# Patient Record
Sex: MALE | Race: WHITE | NOT HISPANIC OR LATINO | URBAN - METROPOLITAN AREA
[De-identification: names, ages, dates, MRNs, and addresses within clinical notes are randomized per-mention and may not be internally consistent; named-entity substitution may affect disease eponyms.]

---

## 2017-02-07 ENCOUNTER — OUTPATIENT (OUTPATIENT)
Dept: OUTPATIENT SERVICES | Facility: HOSPITAL | Age: 67
LOS: 1 days | Discharge: HOME | End: 2017-02-07

## 2017-02-17 ENCOUNTER — APPOINTMENT (OUTPATIENT)
Dept: CARDIOLOGY | Facility: CLINIC | Age: 67
End: 2017-02-17

## 2017-02-17 VITALS
BODY MASS INDEX: 29.16 KG/M2 | OXYGEN SATURATION: 95 % | SYSTOLIC BLOOD PRESSURE: 117 MMHG | DIASTOLIC BLOOD PRESSURE: 73 MMHG | WEIGHT: 220 LBS | HEART RATE: 82 BPM | HEIGHT: 73 IN

## 2017-06-27 DIAGNOSIS — I71.2 THORACIC AORTIC ANEURYSM, WITHOUT RUPTURE: ICD-10-CM

## 2017-08-25 ENCOUNTER — APPOINTMENT (OUTPATIENT)
Dept: CARDIOLOGY | Facility: CLINIC | Age: 67
End: 2017-08-25

## 2017-10-31 ENCOUNTER — MOBILE ON CALL (OUTPATIENT)
Age: 67
End: 2017-10-31

## 2017-12-08 ENCOUNTER — APPOINTMENT (OUTPATIENT)
Dept: CARDIOLOGY | Facility: CLINIC | Age: 67
End: 2017-12-08

## 2017-12-08 VITALS
BODY MASS INDEX: 29.95 KG/M2 | HEART RATE: 75 BPM | DIASTOLIC BLOOD PRESSURE: 79 MMHG | SYSTOLIC BLOOD PRESSURE: 126 MMHG | WEIGHT: 227 LBS | OXYGEN SATURATION: 97 %

## 2017-12-08 DIAGNOSIS — Z80.9 FAMILY HISTORY OF MALIGNANT NEOPLASM, UNSPECIFIED: ICD-10-CM

## 2017-12-08 DIAGNOSIS — Z95.2 PRESENCE OF PROSTHETIC HEART VALVE: ICD-10-CM

## 2017-12-08 DIAGNOSIS — F15.90 OTHER STIMULANT USE, UNSPECIFIED, UNCOMPLICATED: ICD-10-CM

## 2017-12-08 DIAGNOSIS — E78.00 PURE HYPERCHOLESTEROLEMIA, UNSPECIFIED: ICD-10-CM

## 2017-12-18 ENCOUNTER — OUTPATIENT (OUTPATIENT)
Dept: OUTPATIENT SERVICES | Facility: HOSPITAL | Age: 67
LOS: 1 days | Discharge: HOME | End: 2017-12-18

## 2017-12-18 DIAGNOSIS — Z01.818 ENCOUNTER FOR OTHER PREPROCEDURAL EXAMINATION: ICD-10-CM

## 2017-12-18 DIAGNOSIS — I48.92 UNSPECIFIED ATRIAL FLUTTER: ICD-10-CM

## 2018-01-30 ENCOUNTER — OUTPATIENT (OUTPATIENT)
Dept: OUTPATIENT SERVICES | Facility: HOSPITAL | Age: 68
LOS: 1 days | Discharge: HOME | End: 2018-01-30

## 2018-01-30 ENCOUNTER — INPATIENT (INPATIENT)
Facility: HOSPITAL | Age: 68
LOS: 0 days | Discharge: HOME | End: 2018-01-31
Attending: INTERNAL MEDICINE

## 2018-01-30 DIAGNOSIS — Z95.2 PRESENCE OF PROSTHETIC HEART VALVE: ICD-10-CM

## 2018-01-30 DIAGNOSIS — Z45.018 ENCOUNTER FOR ADJUSTMENT AND MANAGEMENT OF OTHER PART OF CARDIAC PACEMAKER: ICD-10-CM

## 2018-01-30 DIAGNOSIS — I45.5 OTHER SPECIFIED HEART BLOCK: ICD-10-CM

## 2018-01-30 DIAGNOSIS — I38 ENDOCARDITIS, VALVE UNSPECIFIED: ICD-10-CM

## 2018-01-30 DIAGNOSIS — I48.92 UNSPECIFIED ATRIAL FLUTTER: ICD-10-CM

## 2018-01-30 DIAGNOSIS — I10 ESSENTIAL (PRIMARY) HYPERTENSION: ICD-10-CM

## 2018-02-04 DIAGNOSIS — Z95.810 PRESENCE OF AUTOMATIC (IMPLANTABLE) CARDIAC DEFIBRILLATOR: ICD-10-CM

## 2018-02-12 DIAGNOSIS — I10 ESSENTIAL (PRIMARY) HYPERTENSION: ICD-10-CM

## 2018-02-12 DIAGNOSIS — Z45.018 ENCOUNTER FOR ADJUSTMENT AND MANAGEMENT OF OTHER PART OF CARDIAC PACEMAKER: ICD-10-CM

## 2018-02-12 DIAGNOSIS — Z95.2 PRESENCE OF PROSTHETIC HEART VALVE: ICD-10-CM

## 2018-02-12 DIAGNOSIS — M17.0 BILATERAL PRIMARY OSTEOARTHRITIS OF KNEE: ICD-10-CM

## 2018-02-23 ENCOUNTER — APPOINTMENT (OUTPATIENT)
Dept: CARDIOLOGY | Facility: CLINIC | Age: 68
End: 2018-02-23

## 2018-02-23 VITALS
DIASTOLIC BLOOD PRESSURE: 83 MMHG | OXYGEN SATURATION: 95 % | HEART RATE: 80 BPM | WEIGHT: 230 LBS | BODY MASS INDEX: 30.48 KG/M2 | SYSTOLIC BLOOD PRESSURE: 139 MMHG | HEIGHT: 73 IN

## 2018-07-06 ENCOUNTER — APPOINTMENT (OUTPATIENT)
Dept: CARDIOLOGY | Facility: CLINIC | Age: 68
End: 2018-07-06

## 2018-07-06 VITALS
BODY MASS INDEX: 30.48 KG/M2 | SYSTOLIC BLOOD PRESSURE: 114 MMHG | HEIGHT: 73 IN | OXYGEN SATURATION: 95 % | DIASTOLIC BLOOD PRESSURE: 74 MMHG | WEIGHT: 230 LBS | HEART RATE: 85 BPM

## 2018-07-06 DIAGNOSIS — I49.8 OTHER SPECIFIED CARDIAC ARRHYTHMIAS: ICD-10-CM

## 2018-11-27 ENCOUNTER — OUTPATIENT (OUTPATIENT)
Dept: OUTPATIENT SERVICES | Facility: HOSPITAL | Age: 68
LOS: 1 days | Discharge: HOME | End: 2018-11-27

## 2018-11-27 DIAGNOSIS — I71.2 THORACIC AORTIC ANEURYSM, WITHOUT RUPTURE: ICD-10-CM

## 2018-11-27 DIAGNOSIS — R06.9 UNSPECIFIED ABNORMALITIES OF BREATHING: ICD-10-CM

## 2019-01-11 ENCOUNTER — APPOINTMENT (OUTPATIENT)
Dept: CARDIOLOGY | Facility: CLINIC | Age: 69
End: 2019-01-11

## 2019-01-11 VITALS
HEIGHT: 73 IN | DIASTOLIC BLOOD PRESSURE: 84 MMHG | BODY MASS INDEX: 29.95 KG/M2 | HEART RATE: 75 BPM | SYSTOLIC BLOOD PRESSURE: 128 MMHG | WEIGHT: 226 LBS

## 2019-01-11 DIAGNOSIS — Z95.0 PRESENCE OF CARDIAC PACEMAKER: ICD-10-CM

## 2019-01-11 NOTE — PROCEDURE
[None] : none [Complete Heart Block] : complete heart block [CRT-P] : Cardiac resynchronization therapy pacemaker [DDDR] : DDDR [Voltage: ___ volts] : Voltage was [unfilled] volts [Longevity: ___ months] : The estimated remaining battery life is [unfilled] months [Threshold Testing Performed] : Threshold testing was performed [Atrial] : Atrial [Ventricular] : Ventricular [Sensing Amplitude ___mv] : sensing amplitude was [unfilled] mv [Lead Imp:  ___ohms] : lead impedance was [unfilled] ohms [___V @] : [unfilled] V [___ ms] : [unfilled] ms [Programmed for Longevity] : output reprogrammed for improved battery longevity [Counters Reset] : the counters were reset [Asense-Vsense ___ %] : Asense-Vsense [unfilled]% [Asense-Vpace ___ %] : Asense-Vpace [unfilled]% [Apace-Vsense ___ %] : Apace-Vsense [unfilled]% [Apace-Vpace ___ %] : Apace-Vpace [unfilled]% [de-identified] : FATOUMATA [de-identified] : Percepta [de-identified] : [id number] [de-identified] : 70 [de-identified] : Partial + turned to on due to presence of farfield oversensing at times.  [de-identified] : Normal device function. No events

## 2019-01-11 NOTE — PROCEDURE
[None] : none [Complete Heart Block] : complete heart block [CRT-P] : Cardiac resynchronization therapy pacemaker [DDDR] : DDDR [Voltage: ___ volts] : Voltage was [unfilled] volts [Longevity: ___ months] : The estimated remaining battery life is [unfilled] months [Threshold Testing Performed] : Threshold testing was performed [Atrial] : Atrial [Ventricular] : Ventricular [Sensing Amplitude ___mv] : sensing amplitude was [unfilled] mv [Lead Imp:  ___ohms] : lead impedance was [unfilled] ohms [___V @] : [unfilled] V [___ ms] : [unfilled] ms [Programmed for Longevity] : output reprogrammed for improved battery longevity [Counters Reset] : the counters were reset [Asense-Vsense ___ %] : Asense-Vsense [unfilled]% [Asense-Vpace ___ %] : Asense-Vpace [unfilled]% [Apace-Vsense ___ %] : Apace-Vsense [unfilled]% [Apace-Vpace ___ %] : Apace-Vpace [unfilled]% [de-identified] : FATOUMATA [de-identified] : Percepta [de-identified] : [id number] [de-identified] : 70 [de-identified] : Partial + turned to on due to presence of farfield oversensing at times.  [de-identified] : Normal device function. No events

## 2019-01-11 NOTE — HISTORY OF PRESENT ILLNESS
[None] : The patient complains of no symptoms [Palpitations] : no palpitations [SOB] : no dyspnea [Syncope] : no syncope [ICD Shocks] : no recent ICD shocks [Erythema at Site] : no erythema at device site [Swelling at Site] : no swelling at device site

## 2019-01-11 NOTE — ASSESSMENT
[FreeTextEntry1] : AF - pt decided to stopped AC on his own; I recommended to continue risk and befits explained\par \par \par NSVT asymptomatic\par - echo\par - stress test

## 2019-06-07 ENCOUNTER — INPATIENT (INPATIENT)
Facility: HOSPITAL | Age: 69
LOS: 6 days | Discharge: HOME HEALTH | End: 2019-06-14
Attending: INTERNAL MEDICINE | Admitting: INTERNAL MEDICINE
Payer: MEDICARE

## 2019-06-07 VITALS
DIASTOLIC BLOOD PRESSURE: 65 MMHG | HEART RATE: 90 BPM | RESPIRATION RATE: 18 BRPM | WEIGHT: 225.09 LBS | SYSTOLIC BLOOD PRESSURE: 125 MMHG | TEMPERATURE: 102 F | OXYGEN SATURATION: 97 %

## 2019-06-07 LAB
ALBUMIN SERPL ELPH-MCNC: 4 G/DL — SIGNIFICANT CHANGE UP (ref 3.5–5.2)
ALP SERPL-CCNC: 67 U/L — SIGNIFICANT CHANGE UP (ref 30–115)
ALT FLD-CCNC: 43 U/L — HIGH (ref 0–41)
ANION GAP SERPL CALC-SCNC: 13 MMOL/L — SIGNIFICANT CHANGE UP (ref 7–14)
APTT BLD: 35.6 SEC — SIGNIFICANT CHANGE UP (ref 27–39.2)
AST SERPL-CCNC: 47 U/L — HIGH (ref 0–41)
BASE EXCESS BLDV CALC-SCNC: -0.2 MMOL/L — SIGNIFICANT CHANGE UP (ref -2–2)
BASOPHILS # BLD AUTO: 0.03 K/UL — SIGNIFICANT CHANGE UP (ref 0–0.2)
BASOPHILS NFR BLD AUTO: 0.2 % — SIGNIFICANT CHANGE UP (ref 0–1)
BILIRUB SERPL-MCNC: 0.9 MG/DL — SIGNIFICANT CHANGE UP (ref 0.2–1.2)
BUN SERPL-MCNC: 19 MG/DL — SIGNIFICANT CHANGE UP (ref 10–20)
CA-I SERPL-SCNC: 1.17 MMOL/L — SIGNIFICANT CHANGE UP (ref 1.12–1.3)
CALCIUM SERPL-MCNC: 8.7 MG/DL — SIGNIFICANT CHANGE UP (ref 8.5–10.1)
CHLORIDE SERPL-SCNC: 102 MMOL/L — SIGNIFICANT CHANGE UP (ref 98–110)
CO2 SERPL-SCNC: 22 MMOL/L — SIGNIFICANT CHANGE UP (ref 17–32)
CREAT SERPL-MCNC: 1.3 MG/DL — SIGNIFICANT CHANGE UP (ref 0.7–1.5)
EOSINOPHIL # BLD AUTO: 0 K/UL — SIGNIFICANT CHANGE UP (ref 0–0.7)
EOSINOPHIL NFR BLD AUTO: 0 % — SIGNIFICANT CHANGE UP (ref 0–8)
GAS PNL BLDV: 137 MMOL/L — SIGNIFICANT CHANGE UP (ref 136–145)
GAS PNL BLDV: SIGNIFICANT CHANGE UP
GLUCOSE SERPL-MCNC: 140 MG/DL — HIGH (ref 70–99)
HCO3 BLDV-SCNC: 24 MMOL/L — SIGNIFICANT CHANGE UP (ref 22–29)
HCT VFR BLD CALC: 43.5 % — SIGNIFICANT CHANGE UP (ref 42–52)
HCT VFR BLDA CALC: 47.1 % — HIGH (ref 34–44)
HGB BLD CALC-MCNC: 15.4 G/DL — SIGNIFICANT CHANGE UP (ref 14–18)
HGB BLD-MCNC: 14.5 G/DL — SIGNIFICANT CHANGE UP (ref 14–18)
IMM GRANULOCYTES NFR BLD AUTO: 0.5 % — HIGH (ref 0.1–0.3)
INR BLD: 1.78 RATIO — HIGH (ref 0.65–1.3)
LACTATE BLDV-MCNC: 1.4 MMOL/L — SIGNIFICANT CHANGE UP (ref 0.5–1.6)
LACTATE SERPL-SCNC: 1.6 MMOL/L — SIGNIFICANT CHANGE UP (ref 0.5–2.2)
LIDOCAIN IGE QN: 15 U/L — SIGNIFICANT CHANGE UP (ref 7–60)
LYMPHOCYTES # BLD AUTO: 0.59 K/UL — LOW (ref 1.2–3.4)
LYMPHOCYTES # BLD AUTO: 3.9 % — LOW (ref 20.5–51.1)
MCHC RBC-ENTMCNC: 30.8 PG — SIGNIFICANT CHANGE UP (ref 27–31)
MCHC RBC-ENTMCNC: 33.3 G/DL — SIGNIFICANT CHANGE UP (ref 32–37)
MCV RBC AUTO: 92.4 FL — SIGNIFICANT CHANGE UP (ref 80–94)
MONOCYTES # BLD AUTO: 1.01 K/UL — HIGH (ref 0.1–0.6)
MONOCYTES NFR BLD AUTO: 6.7 % — SIGNIFICANT CHANGE UP (ref 1.7–9.3)
NEUTROPHILS # BLD AUTO: 13.43 K/UL — HIGH (ref 1.4–6.5)
NEUTROPHILS NFR BLD AUTO: 88.7 % — HIGH (ref 42.2–75.2)
NRBC # BLD: 0 /100 WBCS — SIGNIFICANT CHANGE UP (ref 0–0)
PCO2 BLDV: 38 MMHG — LOW (ref 41–51)
PH BLDV: 7.41 — SIGNIFICANT CHANGE UP (ref 7.26–7.43)
PLATELET # BLD AUTO: 133 K/UL — SIGNIFICANT CHANGE UP (ref 130–400)
PO2 BLDV: 44 MMHG — HIGH (ref 20–40)
POTASSIUM BLDV-SCNC: 4.2 MMOL/L — SIGNIFICANT CHANGE UP (ref 3.3–5.6)
POTASSIUM SERPL-MCNC: 4.3 MMOL/L — SIGNIFICANT CHANGE UP (ref 3.5–5)
POTASSIUM SERPL-SCNC: 4.3 MMOL/L — SIGNIFICANT CHANGE UP (ref 3.5–5)
PROT SERPL-MCNC: 6.5 G/DL — SIGNIFICANT CHANGE UP (ref 6–8)
PROTHROM AB SERPL-ACNC: 20.4 SEC — HIGH (ref 9.95–12.87)
RBC # BLD: 4.71 M/UL — SIGNIFICANT CHANGE UP (ref 4.7–6.1)
RBC # FLD: 13 % — SIGNIFICANT CHANGE UP (ref 11.5–14.5)
SAO2 % BLDV: 83 % — SIGNIFICANT CHANGE UP
SODIUM SERPL-SCNC: 137 MMOL/L — SIGNIFICANT CHANGE UP (ref 135–146)
TROPONIN T SERPL-MCNC: <0.01 NG/ML — SIGNIFICANT CHANGE UP
WBC # BLD: 15.14 K/UL — HIGH (ref 4.8–10.8)
WBC # FLD AUTO: 15.14 K/UL — HIGH (ref 4.8–10.8)

## 2019-06-07 PROCEDURE — 71045 X-RAY EXAM CHEST 1 VIEW: CPT | Mod: 26

## 2019-06-07 PROCEDURE — 99285 EMERGENCY DEPT VISIT HI MDM: CPT

## 2019-06-07 PROCEDURE — 93010 ELECTROCARDIOGRAM REPORT: CPT

## 2019-06-07 RX ORDER — GENTAMICIN SULFATE 40 MG/ML
100 VIAL (ML) INJECTION ONCE
Refills: 0 | Status: COMPLETED | OUTPATIENT
Start: 2019-06-07 | End: 2019-06-07

## 2019-06-07 RX ORDER — VANCOMYCIN HCL 1 G
2600 VIAL (EA) INTRAVENOUS ONCE
Refills: 0 | Status: DISCONTINUED | OUTPATIENT
Start: 2019-06-07 | End: 2019-06-07

## 2019-06-07 RX ORDER — CEFEPIME 1 G/1
2000 INJECTION, POWDER, FOR SOLUTION INTRAMUSCULAR; INTRAVENOUS ONCE
Refills: 0 | Status: COMPLETED | OUTPATIENT
Start: 2019-06-07 | End: 2019-06-07

## 2019-06-07 RX ORDER — VANCOMYCIN HCL 1 G
2000 VIAL (EA) INTRAVENOUS ONCE
Refills: 0 | Status: COMPLETED | OUTPATIENT
Start: 2019-06-07 | End: 2019-06-07

## 2019-06-07 RX ORDER — ACETAMINOPHEN 500 MG
650 TABLET ORAL ONCE
Refills: 0 | Status: COMPLETED | OUTPATIENT
Start: 2019-06-07 | End: 2019-06-07

## 2019-06-07 RX ORDER — SODIUM CHLORIDE 9 MG/ML
2000 INJECTION INTRAMUSCULAR; INTRAVENOUS; SUBCUTANEOUS ONCE
Refills: 0 | Status: COMPLETED | OUTPATIENT
Start: 2019-06-07 | End: 2019-06-07

## 2019-06-07 RX ADMIN — Medication 250 MILLIGRAM(S): at 23:54

## 2019-06-07 RX ADMIN — Medication 650 MILLIGRAM(S): at 22:07

## 2019-06-07 RX ADMIN — SODIUM CHLORIDE 2000 MILLILITER(S): 9 INJECTION INTRAMUSCULAR; INTRAVENOUS; SUBCUTANEOUS at 22:58

## 2019-06-07 RX ADMIN — CEFEPIME 100 MILLIGRAM(S): 1 INJECTION, POWDER, FOR SOLUTION INTRAMUSCULAR; INTRAVENOUS at 23:32

## 2019-06-07 NOTE — ED ADULT NURSE NOTE - OBJECTIVE STATEMENT
pt reports feeling " off " all day. Was shopping and felt extremely weak and "wobbly", had chills all day long. Went to urgent care and was sent here for r/o sepsis. Elevated temp in triage. Had a mild cough 3 weeks ago, but did not seek medical attention.

## 2019-06-07 NOTE — ED ADULT NURSE NOTE - NSIMPLEMENTINTERV_GEN_ALL_ED
Implemented All Universal Safety Interventions:  Angelica to call system. Call bell, personal items and telephone within reach. Instruct patient to call for assistance. Room bathroom lighting operational. Non-slip footwear when patient is off stretcher. Physically safe environment: no spills, clutter or unnecessary equipment. Stretcher in lowest position, wheels locked, appropriate side rails in place.

## 2019-06-08 DIAGNOSIS — Z95.2 PRESENCE OF PROSTHETIC HEART VALVE: Chronic | ICD-10-CM

## 2019-06-08 LAB
ALBUMIN SERPL ELPH-MCNC: 3.5 G/DL — SIGNIFICANT CHANGE UP (ref 3.5–5.2)
ALP SERPL-CCNC: 50 U/L — SIGNIFICANT CHANGE UP (ref 30–115)
ALT FLD-CCNC: 41 U/L — SIGNIFICANT CHANGE UP (ref 0–41)
ANION GAP SERPL CALC-SCNC: 8 MMOL/L — SIGNIFICANT CHANGE UP (ref 7–14)
APPEARANCE UR: CLEAR — SIGNIFICANT CHANGE UP
AST SERPL-CCNC: 44 U/L — HIGH (ref 0–41)
BACTERIA # UR AUTO: SIGNIFICANT CHANGE UP /HPF
BASOPHILS # BLD AUTO: 0.03 K/UL — SIGNIFICANT CHANGE UP (ref 0–0.2)
BASOPHILS NFR BLD AUTO: 0.2 % — SIGNIFICANT CHANGE UP (ref 0–1)
BILIRUB SERPL-MCNC: 0.9 MG/DL — SIGNIFICANT CHANGE UP (ref 0.2–1.2)
BILIRUB UR-MCNC: ABNORMAL
BUN SERPL-MCNC: 19 MG/DL — SIGNIFICANT CHANGE UP (ref 10–20)
CALCIUM SERPL-MCNC: 8.2 MG/DL — LOW (ref 8.5–10.1)
CHLORIDE SERPL-SCNC: 110 MMOL/L — SIGNIFICANT CHANGE UP (ref 98–110)
CO2 SERPL-SCNC: 22 MMOL/L — SIGNIFICANT CHANGE UP (ref 17–32)
COLOR SPEC: SIGNIFICANT CHANGE UP
COMMENT - URINE: SIGNIFICANT CHANGE UP
CREAT SERPL-MCNC: 1.2 MG/DL — SIGNIFICANT CHANGE UP (ref 0.7–1.5)
DIFF PNL FLD: NEGATIVE — SIGNIFICANT CHANGE UP
ENTEROCOC DNA BLD POS QL NAA+NON-PROBE: SIGNIFICANT CHANGE UP
EOSINOPHIL # BLD AUTO: 0.01 K/UL — SIGNIFICANT CHANGE UP (ref 0–0.7)
EOSINOPHIL NFR BLD AUTO: 0.1 % — SIGNIFICANT CHANGE UP (ref 0–8)
EPI CELLS # UR: ABNORMAL /HPF
GLUCOSE SERPL-MCNC: 143 MG/DL — HIGH (ref 70–99)
GLUCOSE UR QL: 250
GRAM STN FLD: SIGNIFICANT CHANGE UP
HCT VFR BLD CALC: 38.3 % — LOW (ref 42–52)
HGB BLD-MCNC: 12.8 G/DL — LOW (ref 14–18)
IMM GRANULOCYTES NFR BLD AUTO: 0.7 % — HIGH (ref 0.1–0.3)
KETONES UR-MCNC: ABNORMAL
LEUKOCYTE ESTERASE UR-ACNC: ABNORMAL
LYMPHOCYTES # BLD AUTO: 0.64 K/UL — LOW (ref 1.2–3.4)
LYMPHOCYTES # BLD AUTO: 4.7 % — LOW (ref 20.5–51.1)
MCHC RBC-ENTMCNC: 31.1 PG — HIGH (ref 27–31)
MCHC RBC-ENTMCNC: 33.4 G/DL — SIGNIFICANT CHANGE UP (ref 32–37)
MCV RBC AUTO: 93.2 FL — SIGNIFICANT CHANGE UP (ref 80–94)
METHOD TYPE: SIGNIFICANT CHANGE UP
MONOCYTES # BLD AUTO: 0.87 K/UL — HIGH (ref 0.1–0.6)
MONOCYTES NFR BLD AUTO: 6.4 % — SIGNIFICANT CHANGE UP (ref 1.7–9.3)
NEUTROPHILS # BLD AUTO: 12.01 K/UL — HIGH (ref 1.4–6.5)
NEUTROPHILS NFR BLD AUTO: 87.9 % — HIGH (ref 42.2–75.2)
NITRITE UR-MCNC: NEGATIVE — SIGNIFICANT CHANGE UP
NRBC # BLD: 0 /100 WBCS — SIGNIFICANT CHANGE UP (ref 0–0)
PH UR: 6.5 — SIGNIFICANT CHANGE UP (ref 5–8)
PLATELET # BLD AUTO: 107 K/UL — LOW (ref 130–400)
POTASSIUM SERPL-MCNC: 4.2 MMOL/L — SIGNIFICANT CHANGE UP (ref 3.5–5)
POTASSIUM SERPL-SCNC: 4.2 MMOL/L — SIGNIFICANT CHANGE UP (ref 3.5–5)
PROT SERPL-MCNC: 5.5 G/DL — LOW (ref 6–8)
PROT UR-MCNC: ABNORMAL
RBC # BLD: 4.11 M/UL — LOW (ref 4.7–6.1)
RBC # FLD: 13.2 % — SIGNIFICANT CHANGE UP (ref 11.5–14.5)
SODIUM SERPL-SCNC: 140 MMOL/L — SIGNIFICANT CHANGE UP (ref 135–146)
SP GR SPEC: >=1.03 — SIGNIFICANT CHANGE UP (ref 1.01–1.03)
SPECIMEN SOURCE: SIGNIFICANT CHANGE UP
SPECIMEN SOURCE: SIGNIFICANT CHANGE UP
UROBILINOGEN FLD QL: 1 (ref 0.2–0.2)
WBC # BLD: 13.65 K/UL — HIGH (ref 4.8–10.8)
WBC # FLD AUTO: 13.65 K/UL — HIGH (ref 4.8–10.8)
WBC UR QL: SIGNIFICANT CHANGE UP /HPF

## 2019-06-08 PROCEDURE — 93306 TTE W/DOPPLER COMPLETE: CPT | Mod: 26

## 2019-06-08 RX ORDER — GENTAMICIN SULFATE 40 MG/ML
100 VIAL (ML) INJECTION EVERY 8 HOURS
Refills: 0 | Status: DISCONTINUED | OUTPATIENT
Start: 2019-06-08 | End: 2019-06-10

## 2019-06-08 RX ORDER — METOPROLOL TARTRATE 50 MG
25 TABLET ORAL
Refills: 0 | Status: DISCONTINUED | OUTPATIENT
Start: 2019-06-08 | End: 2019-06-14

## 2019-06-08 RX ORDER — APIXABAN 2.5 MG/1
1 TABLET, FILM COATED ORAL
Qty: 0 | Refills: 0 | DISCHARGE

## 2019-06-08 RX ORDER — METOPROLOL TARTRATE 50 MG
1 TABLET ORAL
Qty: 0 | Refills: 0 | DISCHARGE

## 2019-06-08 RX ORDER — ROSUVASTATIN CALCIUM 5 MG/1
1 TABLET ORAL
Qty: 0 | Refills: 0 | DISCHARGE

## 2019-06-08 RX ORDER — ASPIRIN/CALCIUM CARB/MAGNESIUM 324 MG
1 TABLET ORAL
Qty: 0 | Refills: 0 | DISCHARGE

## 2019-06-08 RX ORDER — GENTAMICIN SULFATE 40 MG/ML
100 VIAL (ML) INJECTION ONCE
Refills: 0 | Status: COMPLETED | OUTPATIENT
Start: 2019-06-08 | End: 2019-06-08

## 2019-06-08 RX ORDER — ATORVASTATIN CALCIUM 80 MG/1
40 TABLET, FILM COATED ORAL AT BEDTIME
Refills: 0 | Status: DISCONTINUED | OUTPATIENT
Start: 2019-06-08 | End: 2019-06-14

## 2019-06-08 RX ORDER — APIXABAN 2.5 MG/1
5 TABLET, FILM COATED ORAL EVERY 12 HOURS
Refills: 0 | Status: DISCONTINUED | OUTPATIENT
Start: 2019-06-08 | End: 2019-06-14

## 2019-06-08 RX ORDER — ASPIRIN/CALCIUM CARB/MAGNESIUM 324 MG
81 TABLET ORAL DAILY
Refills: 0 | Status: DISCONTINUED | OUTPATIENT
Start: 2019-06-08 | End: 2019-06-14

## 2019-06-08 RX ORDER — CEFEPIME 1 G/1
2000 INJECTION, POWDER, FOR SOLUTION INTRAMUSCULAR; INTRAVENOUS EVERY 8 HOURS
Refills: 0 | Status: DISCONTINUED | OUTPATIENT
Start: 2019-06-08 | End: 2019-06-09

## 2019-06-08 RX ORDER — ACETAMINOPHEN 500 MG
650 TABLET ORAL EVERY 6 HOURS
Refills: 0 | Status: DISCONTINUED | OUTPATIENT
Start: 2019-06-08 | End: 2019-06-14

## 2019-06-08 RX ORDER — SODIUM CHLORIDE 9 MG/ML
1000 INJECTION INTRAMUSCULAR; INTRAVENOUS; SUBCUTANEOUS
Refills: 0 | Status: DISCONTINUED | OUTPATIENT
Start: 2019-06-08 | End: 2019-06-10

## 2019-06-08 RX ORDER — ALFUZOSIN HYDROCHLORIDE 10 MG/1
1 TABLET, EXTENDED RELEASE ORAL
Qty: 0 | Refills: 0 | DISCHARGE

## 2019-06-08 RX ORDER — FENOFIBRATE,MICRONIZED 130 MG
1 CAPSULE ORAL
Qty: 0 | Refills: 0 | DISCHARGE

## 2019-06-08 RX ORDER — VANCOMYCIN HCL 1 G
1500 VIAL (EA) INTRAVENOUS EVERY 12 HOURS
Refills: 0 | Status: DISCONTINUED | OUTPATIENT
Start: 2019-06-08 | End: 2019-06-11

## 2019-06-08 RX ADMIN — Medication 650 MILLIGRAM(S): at 16:25

## 2019-06-08 RX ADMIN — Medication 25 MILLIGRAM(S): at 20:15

## 2019-06-08 RX ADMIN — Medication 300 MILLIGRAM(S): at 07:02

## 2019-06-08 RX ADMIN — APIXABAN 5 MILLIGRAM(S): 2.5 TABLET, FILM COATED ORAL at 20:14

## 2019-06-08 RX ADMIN — APIXABAN 5 MILLIGRAM(S): 2.5 TABLET, FILM COATED ORAL at 06:33

## 2019-06-08 RX ADMIN — SODIUM CHLORIDE 100 MILLILITER(S): 9 INJECTION INTRAMUSCULAR; INTRAVENOUS; SUBCUTANEOUS at 12:54

## 2019-06-08 RX ADMIN — ATORVASTATIN CALCIUM 40 MILLIGRAM(S): 80 TABLET, FILM COATED ORAL at 21:58

## 2019-06-08 RX ADMIN — CEFEPIME 100 MILLIGRAM(S): 1 INJECTION, POWDER, FOR SOLUTION INTRAMUSCULAR; INTRAVENOUS at 14:07

## 2019-06-08 RX ADMIN — Medication 300 MILLIGRAM(S): at 20:15

## 2019-06-08 RX ADMIN — CEFEPIME 100 MILLIGRAM(S): 1 INJECTION, POWDER, FOR SOLUTION INTRAMUSCULAR; INTRAVENOUS at 22:46

## 2019-06-08 RX ADMIN — Medication 100 MILLIGRAM(S): at 14:09

## 2019-06-08 RX ADMIN — Medication 200 MILLIGRAM(S): at 02:21

## 2019-06-08 RX ADMIN — SODIUM CHLORIDE 100 MILLILITER(S): 9 INJECTION INTRAMUSCULAR; INTRAVENOUS; SUBCUTANEOUS at 06:32

## 2019-06-08 RX ADMIN — CEFEPIME 100 MILLIGRAM(S): 1 INJECTION, POWDER, FOR SOLUTION INTRAMUSCULAR; INTRAVENOUS at 06:32

## 2019-06-08 RX ADMIN — Medication 650 MILLIGRAM(S): at 14:10

## 2019-06-08 RX ADMIN — Medication 650 MILLIGRAM(S): at 21:58

## 2019-06-08 RX ADMIN — Medication 25 MILLIGRAM(S): at 06:32

## 2019-06-08 RX ADMIN — Medication 100 MILLIGRAM(S): at 21:58

## 2019-06-08 RX ADMIN — Medication 100 MILLIGRAM(S): at 06:58

## 2019-06-08 RX ADMIN — SODIUM CHLORIDE 2000 MILLILITER(S): 9 INJECTION INTRAMUSCULAR; INTRAVENOUS; SUBCUTANEOUS at 00:08

## 2019-06-08 RX ADMIN — Medication 650 MILLIGRAM(S): at 02:34

## 2019-06-08 RX ADMIN — CEFEPIME 2000 MILLIGRAM(S): 1 INJECTION, POWDER, FOR SOLUTION INTRAMUSCULAR; INTRAVENOUS at 00:08

## 2019-06-08 RX ADMIN — Medication 81 MILLIGRAM(S): at 12:54

## 2019-06-08 NOTE — ED PROVIDER NOTE - CLINICAL SUMMARY MEDICAL DECISION MAKING FREE TEXT BOX
pt with fever and malaise, h/o endocarditis, will empirically start abx and admit to r/o endocarditis and other sources of sepsis.   pt admitted to dr. fu's service.  cardio is dr. romo

## 2019-06-08 NOTE — H&P ADULT - NSHPLABSRESULTS_GEN_ALL_CORE
Vitals:    Vital Signs Last 24 Hrs  T(C): 37.2 (08 Jun 2019 00:11), Max: 38.7 (07 Jun 2019 22:00)  T(F): 98.9 (08 Jun 2019 00:11), Max: 101.6 (07 Jun 2019 22:00)  HR: 86 (07 Jun 2019 23:14) (86 - 90)  BP: 112/61 (07 Jun 2019 23:14) (112/61 - 125/65)  BP(mean): --  RR: 18 (07 Jun 2019 23:14) (18 - 18)  SpO2: 96% (07 Jun 2019 23:14) (96% - 97%)    Labs:     06-07    137  |  102  |  19  ----------------------------<  140<H>  4.3   |  22  |  1.3    Ca    8.7      07 Jun 2019 22:45    TPro  6.5  /  Alb  4.0  /  TBili  0.9  /  DBili  x   /  AST  47<H>  /  ALT  43<H>  /  AlkPhos  67  06-07                          14.5   15.14 )-----------( 133      ( 07 Jun 2019 22:45 )             43.5     CARDIAC MARKERS ( 07 Jun 2019 22:45 )  x     / <0.01 ng/mL / x     / x     / x          LIVER FUNCTIONS - ( 07 Jun 2019 22:45 )  Alb: 4.0 g/dL / Pro: 6.5 g/dL / ALK PHOS: 67 U/L / ALT: 43 U/L / AST: 47 U/L / GGT: x           PT/INR - ( 07 Jun 2019 22:45 )   PT: 20.40 sec;   INR: 1.78 ratio         PTT - ( 07 Jun 2019 22:45 )  PTT:35.6 sec  Urinalysis Basic - ( 08 Jun 2019 00:15 )    Color: Dark Yellow / Appearance: Clear / SG: >=1.030 / pH: x  Gluc: x / Ketone: Trace  / Bili: Small / Urobili: 1.0   Blood: x / Protein: Trace / Nitrite: Negative   Leuk Esterase: Trace / RBC: x / WBC 3-5 /HPF   Sq Epi: x / Non Sq Epi: Few /HPF / Bacteria: occ /HPF

## 2019-06-08 NOTE — H&P ADULT - NSICDXPASTMEDICALHX_GEN_ALL_CORE_FT
PAST MEDICAL HISTORY:  Atrial fibrillation     Dyslipidemia     H/O: osteoarthritis     History of BPH     Hypertension     Infective endocarditis

## 2019-06-08 NOTE — H&P ADULT - HISTORY OF PRESENT ILLNESS
68M with PMHx of HTN, BPH, Afib on Eliquis, SAVR (complicated by aortic aneurysm and infective endocarditis in 2011 and 2012) s/p PPM on daily doxycycline for ppx presents for weakness and fever which began the morning of presentation. Patient states he developed sudden onset weakness of lower extremities this AM and feelings of lethargy. Symptoms associated with increased shortness of breath on exertion and subjective fever. He has mild headache but denies any changes in vision or nuchal rigidity. Patient denies any cough, cp, palpitations, nausea, vomiting, diarrhea, constipation, dysuria, abdominal pain, recent travel, recent sick contact, skin tears. Of note patient had 3rd surgical AVR after developing infective endocarditis in 2012. At that time his symptoms were worse. He had severe headache, finger pain with splinter hemorrhage. He was placed on daily doxycycline by cardiology since 2012.     In ED: Vitals T 101.6, 125/65, HR 90, WBC 15K, received x1 Vancomycin and gentamycin. 68M with PMHx of HTN, BPH, Afib on Eliquis, SAVR (complicated by aortic aneurysm and infective endocarditis in 2011 and 2012) s/p PPM on daily doxycycline for ppx presents for weakness and fever which began the morning of presentation. Patient states he developed sudden onset weakness of lower extremities this AM and feelings of lethargy. Symptoms associated with increased shortness of breath on exertion and subjective fever. He has mild headache but denies any changes in vision or nuchal rigidity. Patient denies any cough, cp, palpitations, nausea, vomiting, diarrhea, constipation, dysuria, abdominal pain, recent travel, recent sick contact, skin tears. Of note patient had 3rd surgical AVR after developing infective endocarditis in 2012. At that time his symptoms were worse. He had severe headache, finger pain with splinter hemorrhage. He was placed on daily doxycycline by cardiology since 2012.     In ED: Vitals T 101.6, 125/65, HR 90, WBC 15K, received x1 Vancomycin, gentamycin, cefepime

## 2019-06-08 NOTE — CONSULT NOTE ADULT - ASSESSMENT
Patient is a 68y old  Male with SAVR (complicated by aortic aneurysm and infective endocarditis in 2011 and 2012) s/p PPM on daily doxycycline as prophylaxis,  presents to the ER for evaluation of  weakness, SOB  and fever. Patient states he developed sudden onset weakness of lower extremities  and feelings lethargy.  The ID consult requested to assist with further evaluation of antibiotic management.     # Enterococcus Bacteremia - TTE negative for vegetation  # Prosthetic AV     would recommend:    1. Add Ampicillin 2 g q 4hours until sensitivity of Enterococcus is available  2. Continue vancomycin and Gentamicin for now, and Keep Vancomycin level between 15 to  20  3. MELVIN to rule out vegetation,  4. Repeat Blood cultures X 2 to document clearing the blood stream  5. Cardio Thoracic evaluation   6. Follow up final Blood cultures  7. Monitor Temp. and c/w supportive care    d/w Patient and Nursing staff     will follow the patient with you and make further recommendation based on the clinical course and Lab results  Thank you for the opportunity to participate in Mr. IQBAL's care

## 2019-06-08 NOTE — H&P ADULT - NSHPPHYSICALEXAM_GEN_ALL_CORE
PHYSICAL EXAM:  GEN: No acute distress  LUNGS: Clear to auscultation bilaterally; no wheeze  HEART: S1/S2 present. RRR.  ABD: Soft, non-tender, non-distended. Bowel sounds present  MSK: NC/NC/NE/2+PP/MOROCHO  NEURO: AAOX3, non-focal

## 2019-06-08 NOTE — ED PROVIDER NOTE - PHYSICAL EXAMINATION
VITAL SIGNS: I have reviewed nursing notes and confirm.  CONSTITUTIONAL: Well-developed; well-nourished; in no acute distress. obese  SKIN: Skin exam is warm and dry, <2 sec cap refill, no acute rash  HEAD: Normocephalic; atraumatic.  EYES: PERRL, EOM intact; normal conjunctiva.  ENT: MMM; airway clear.   NECK: Supple; non tender.  CARD: tachycardic, systolic murmur, 2+ dp pulses  RESP: No wheezes, rales or rhonchi, speaking in full sentences  ABD: soft non tender.   EXT: Normal ROM. No edema.  NEURO: Alert, oriented. Grossly unremarkable. No focal deficits.  PSYCH: Cooperative, appropriate.

## 2019-06-08 NOTE — CONSULT NOTE ADULT - SUBJECTIVE AND OBJECTIVE BOX
Patient is a 68y old  Male who presents with a chief complaint of Fever, weakness and dyspnea on exertion (08 Jun 2019 02:03)      INTERVAL HPI/OVERNIGHT EVENTS:        PAST MEDICAL & SURGICAL HISTORY:  Infective endocarditis  History of BPH  Dyslipidemia  Hypertension  H/O: osteoarthritis  Atrial fibrillation  S/P AVR (aortic valve replacement): complicated by aortic aneurysm and infective endocarditis      REVIEW OF SYSTEMS: Total of twelve systems have been reviewed with patient and found to be negative unless mentioned in HPI      SOCIAL HISTORY  Alcohol: Does not drink  Tobacco: Does not smoke  Illicit substance use: None      FAMILY HISTORY: Non contributory to the present illness        No Known Allergies        T(C): 37.3 (06-08-19 @ 21:24), Max: 38.7 (06-07-19 @ 22:00)  HR: 73 (06-08-19 @ 21:24) (70 - 90)  BP: 109/64 (06-08-19 @ 21:24) (109/64 - 136/60)  RR: 20 (06-08-19 @ 21:24) (17 - 20)  SpO2: 97% (06-08-19 @ 21:24) (95% - 97%)      06-07-19 @ 07:01  -  06-08-19 @ 07:00  --------------------------------------------------------  IN: 1050 mL / OUT: 0 mL / NET: 1050 mL    06-08-19 @ 07:01  -  06-08-19 @ 21:38  --------------------------------------------------------  IN: 460 mL / OUT: 200 mL / NET: 260 mL        PHYSICAL EXAM:  GENERAL: Not in distress   CHEST/LUNG:  Aire ntry bilaterally  HEART: s1 and s2 present  ABDOMEN:  Nontender and  Nondistended  EXTREMITIES: No pedal  edema  CNS: Awake and Alert      LABS:                        12.8   13.65 )-----------( 107      ( 08 Jun 2019 06:05 )             38.3     06-08    140  |  110  |  19  ----------------------------<  143<H>  4.2   |  22  |  1.2    Ca    8.2<L>      08 Jun 2019 06:05    TPro  5.5<L>  /  Alb  3.5  /  TBili  0.9  /  DBili  x   /  AST  44<H>  /  ALT  41  /  AlkPhos  50  06-08    PT/INR - ( 07 Jun 2019 22:45 )   PT: 20.40 sec;   INR: 1.78 ratio         PTT - ( 07 Jun 2019 22:45 )  PTT:35.6 sec  Urinalysis Basic - ( 08 Jun 2019 00:15 )    Color: Dark Yellow / Appearance: Clear / SG: >=1.030 / pH: x  Gluc: x / Ketone: Trace  / Bili: Small / Urobili: 1.0   Blood: x / Protein: Trace / Nitrite: Negative   Leuk Esterase: Trace / RBC: x / WBC 3-5 /HPF   Sq Epi: x / Non Sq Epi: Few /HPF / Bacteria: occ /HPF      CAPILLARY BLOOD GLUCOSE            Urinalysis Basic - ( 08 Jun 2019 00:15 )    Color: Dark Yellow / Appearance: Clear / SG: >=1.030 / pH: x  Gluc: x / Ketone: Trace  / Bili: Small / Urobili: 1.0   Blood: x / Protein: Trace / Nitrite: Negative   Leuk Esterase: Trace / RBC: x / WBC 3-5 /HPF   Sq Epi: x / Non Sq Epi: Few /HPF / Bacteria: occ /HPF        MEDICATIONS  (STANDING):  apixaban 5 milliGRAM(s) Oral every 12 hours  aspirin enteric coated 81 milliGRAM(s) Oral daily  atorvastatin 40 milliGRAM(s) Oral at bedtime  cefepime   IVPB 2000 milliGRAM(s) IV Intermittent every 8 hours  gentamicin   IVPB 100 milliGRAM(s) IV Intermittent every 8 hours  metoprolol tartrate 25 milliGRAM(s) Oral two times a day  sodium chloride 0.9%. 1000 milliLiter(s) (100 mL/Hr) IV Continuous <Continuous>  vancomycin  IVPB 1500 milliGRAM(s) IV Intermittent every 12 hours    MEDICATIONS  (PRN):  acetaminophen   Tablet .. 650 milliGRAM(s) Oral every 6 hours PRN Temp greater or equal to 38C (100.4F)          RADIOLOGY & ADDITIONAL TESTS: Patient is a 68y old  Male who presents with a chief complaint of Fever, weakness and dyspnea on exertion (08 Jun 2019 02:03)    68M with PMHx of HTN, BPH, Afib on Eliquis, SAVR (complicated by aortic aneurysm and infective endocarditis in 2011 and 2012) s/p PPM on daily doxycycline for ppx presents for weakness and fever which began the morning of presentation. Patient states he developed sudden onset weakness of lower extremities this AM and feelings of lethargy. Symptoms associated with increased shortness of breath on exertion and subjective fever. He has mild headache but denies any changes in vision or nuchal rigidity. Patient denies any cough, cp, palpitations, nausea, vomiting, diarrhea, constipation, dysuria, abdominal pain, recent travel, recent sick contact, skin tears. Of note patient had 3rd surgical AVR after developing infective endocarditis in 2012. At that time his symptoms were worse. He had severe headache, finger pain with splinter hemorrhage. He was placed on daily doxycycline by cardiology since 2012.       REVIEW OF SYSTEMS: Total of twelve systems have been reviewed with patient and found to be negative unless mentioned in HPI        PAST MEDICAL & SURGICAL HISTORY:  Infective endocarditis  History of BPH  Dyslipidemia  Hypertension  H/O: osteoarthritis  Atrial fibrillation  S/P AVR (aortic valve replacement): complicated by aortic aneurysm and infective endocarditis        SOCIAL HISTORY  Alcohol: Does not drink  Tobacco: Does not smoke  Illicit substance use: None      FAMILY HISTORY: Non contributory to the present illness        No Known Allergies        T(C): 37.3 (06-08-19 @ 21:24), Max: 38.7 (06-07-19 @ 22:00)  HR: 73 (06-08-19 @ 21:24) (70 - 90)  BP: 109/64 (06-08-19 @ 21:24) (109/64 - 136/60)  RR: 20 (06-08-19 @ 21:24) (17 - 20)  SpO2: 97% (06-08-19 @ 21:24) (95% - 97%)      06-07-19 @ 07:01  -  06-08-19 @ 07:00  --------------------------------------------------------  IN: 1050 mL / OUT: 0 mL / NET: 1050 mL    06-08-19 @ 07:01  -  06-08-19 @ 21:38  --------------------------------------------------------  IN: 460 mL / OUT: 200 mL / NET: 260 mL        PHYSICAL EXAM:  GENERAL: Not in distress   CHEST/LUNG:  Aire ntry bilaterally  HEART: s1 and s2 present  ABDOMEN:  Nontender and  Nondistended  EXTREMITIES: No pedal  edema  CNS: Awake and Alert      LABS:                        12.8   13.65 )-----------( 107      ( 08 Jun 2019 06:05 )             38.3     06-08    140  |  110  |  19  ----------------------------<  143<H>  4.2   |  22  |  1.2    Ca    8.2<L>      08 Jun 2019 06:05    TPro  5.5<L>  /  Alb  3.5  /  TBili  0.9  /  DBili  x   /  AST  44<H>  /  ALT  41  /  AlkPhos  50  06-08    PT/INR - ( 07 Jun 2019 22:45 )   PT: 20.40 sec;   INR: 1.78 ratio         PTT - ( 07 Jun 2019 22:45 )  PTT:35.6 sec  Urinalysis Basic - ( 08 Jun 2019 00:15 )    Color: Dark Yellow / Appearance: Clear / SG: >=1.030 / pH: x  Gluc: x / Ketone: Trace  / Bili: Small / Urobili: 1.0   Blood: x / Protein: Trace / Nitrite: Negative   Leuk Esterase: Trace / RBC: x / WBC 3-5 /HPF   Sq Epi: x / Non Sq Epi: Few /HPF / Bacteria: occ /HPF      CAPILLARY BLOOD GLUCOSE            Urinalysis Basic - ( 08 Jun 2019 00:15 )    Color: Dark Yellow / Appearance: Clear / SG: >=1.030 / pH: x  Gluc: x / Ketone: Trace  / Bili: Small / Urobili: 1.0   Blood: x / Protein: Trace / Nitrite: Negative   Leuk Esterase: Trace / RBC: x / WBC 3-5 /HPF   Sq Epi: x / Non Sq Epi: Few /HPF / Bacteria: occ /HPF        MEDICATIONS  (STANDING):  apixaban 5 milliGRAM(s) Oral every 12 hours  aspirin enteric coated 81 milliGRAM(s) Oral daily  atorvastatin 40 milliGRAM(s) Oral at bedtime  cefepime   IVPB 2000 milliGRAM(s) IV Intermittent every 8 hours  gentamicin   IVPB 100 milliGRAM(s) IV Intermittent every 8 hours  metoprolol tartrate 25 milliGRAM(s) Oral two times a day  sodium chloride 0.9%. 1000 milliLiter(s) (100 mL/Hr) IV Continuous <Continuous>  vancomycin  IVPB 1500 milliGRAM(s) IV Intermittent every 12 hours    MEDICATIONS  (PRN):  acetaminophen   Tablet .. 650 milliGRAM(s) Oral every 6 hours PRN Temp greater or equal to 38C (100.4F)          RADIOLOGY & ADDITIONAL TESTS: Patient is a 68y old  Male with SAVR (complicated by aortic aneurysm and infective endocarditis in 2011 and 2012) s/p PPM on daily doxycycline as prophylaxis,  presents to the ER for evaluation of  weakness, SOB  and fever. Patient states he developed sudden onset weakness of lower extremities  and feelings lethargy.  The ID consult requested to assist with further evaluation of antibiotic management.       REVIEW OF SYSTEMS: Total of twelve systems have been reviewed with patient and found to be negative unless mentioned in HPI        PAST MEDICAL & SURGICAL HISTORY:  Infective endocarditis  History of BPH  Dyslipidemia  Hypertension  H/O: osteoarthritis  Atrial fibrillation  S/P AVR (aortic valve replacement): complicated by aortic aneurysm and infective endocarditis        SOCIAL HISTORY  Alcohol: Does not drink  Tobacco: Does not smoke  Illicit substance use: None      FAMILY HISTORY: Non contributory to the present illness        ALLERGIES: No Known Allergies        T(C): 37.3 (06-08-19 @ 21:24), Max: 38.7 (06-07-19 @ 22:00)  HR: 73 (06-08-19 @ 21:24) (70 - 90)  BP: 109/64 (06-08-19 @ 21:24) (109/64 - 136/60)  RR: 20 (06-08-19 @ 21:24) (17 - 20)  SpO2: 97% (06-08-19 @ 21:24) (95% - 97%)      06-07-19 @ 07:01  -  06-08-19 @ 07:00  --------------------------------------------------------  IN: 1050 mL / OUT: 0 mL / NET: 1050 mL    06-08-19 @ 07:01  -  06-08-19 @ 21:38  --------------------------------------------------------  IN: 460 mL / OUT: 200 mL / NET: 260 mL        PHYSICAL EXAM:  GENERAL: Not in distress   CHEST/LUNG:  Aire ntry bilaterally  HEART: s1 and s2 present  ABDOMEN:  Nontender and  Nondistended  EXTREMITIES: No pedal  edema  CNS: Awake and Alert      LABS:                        12.8   13.65 )-----------( 107      ( 08 Jun 2019 06:05 )             38.3     06-08    140  |  110  |  19  ----------------------------<  143<H>  4.2   |  22  |  1.2    Ca    8.2<L>      08 Jun 2019 06:05    TPro  5.5<L>  /  Alb  3.5  /  TBili  0.9  /  DBili  x   /  AST  44<H>  /  ALT  41  /  AlkPhos  50  06-08    PT/INR - ( 07 Jun 2019 22:45 )   PT: 20.40 sec;   INR: 1.78 ratio         PTT - ( 07 Jun 2019 22:45 )  PTT:35.6 sec  Urinalysis Basic - ( 08 Jun 2019 00:15 )    Color: Dark Yellow / Appearance: Clear / SG: >=1.030 / pH: x  Gluc: x / Ketone: Trace  / Bili: Small / Urobili: 1.0   Blood: x / Protein: Trace / Nitrite: Negative   Leuk Esterase: Trace / RBC: x / WBC 3-5 /HPF   Sq Epi: x / Non Sq Epi: Few /HPF / Bacteria: occ /HPF      CAPILLARY BLOOD GLUCOSE            Urinalysis Basic - ( 08 Jun 2019 00:15 )    Color: Dark Yellow / Appearance: Clear / SG: >=1.030 / pH: x  Gluc: x / Ketone: Trace  / Bili: Small / Urobili: 1.0   Blood: x / Protein: Trace / Nitrite: Negative   Leuk Esterase: Trace / RBC: x / WBC 3-5 /HPF   Sq Epi: x / Non Sq Epi: Few /HPF / Bacteria: occ /HPF        MEDICATIONS  (STANDING):  apixaban 5 milliGRAM(s) Oral every 12 hours  aspirin enteric coated 81 milliGRAM(s) Oral daily  atorvastatin 40 milliGRAM(s) Oral at bedtime  cefepime   IVPB 2000 milliGRAM(s) IV Intermittent every 8 hours  gentamicin   IVPB 100 milliGRAM(s) IV Intermittent every 8 hours  metoprolol tartrate 25 milliGRAM(s) Oral two times a day  sodium chloride 0.9%. 1000 milliLiter(s) (100 mL/Hr) IV Continuous <Continuous>  vancomycin  IVPB 1500 milliGRAM(s) IV Intermittent every 12 hours    MEDICATIONS  (PRN):  acetaminophen   Tablet .. 650 milliGRAM(s) Oral every 6 hours PRN Temp greater or equal to 38C (100.4F)        RADIOLOGY & ADDITIONAL TESTS:    < from: Xray Chest 1 View-PORTABLE IMMEDIATE (06.07.19 @ 23:45) >  Impression:  1. Linear subsegmental atelectasis/scarring at the left base.  2. Otherwise, no radiographic evidence of acute cardiopulmonary disease.        < end of copied text >        MICROBIOLOGY DATA:      Culture - Blood (06.07.19 @ 22:45)    Gram Stain:   Growth in aerobic and anaerobic bottles: Gram Positive Cocci in Pairs and  Chains    Specimen Source: .Blood Blood    Culture Results:   Growth in aerobic and anaerobic bottles: Enterococcus species  See previous culture [id number]      Culture - Blood (06.07.19 @ 22:45)    Gram Stain:   Growth in aerobic bottle: Gram Positive Cocci in Pairs and Chains  Growth in anaerobic bottle: Gram Positive Cocci in Pairs and Chains    -  Enterococcus species: Detec    Specimen Source: .Blood Blood    Organism: Blood Culture PCR    Culture Results:   Growth in aerobic bottle: Gram Positive Cocci in Pairs and Chains  Growth in anaerobic bottle: Gram Positive Cocci in Pairs and Chains  "Due to technical problems, Proteus sp. will Not be reported as part of  the BCID panel until further notice"  ***Blood Panel PCR results on this specimen are available  approximately 3 hours after the Gram stain result.***  Gram stain, PCR, and/or culture results may not always  correspond due to difference in methodologies.  ************************************************************  This PCR assay was performed using [employer].  The following targets are tested for: Enterococcus,  vancomycin resistant enterococci, Listeria monocytogenes,  coagulase negative staphylococci, S. aureus,  methicillin resistant S. aureus, Streptococcus agalactiae  (Group B), S. pneumoniae, S. pyogenes (Group A),  Acinetobacter baumannii, Enterobacter cloacae, E. coli,  Klebsiella oxytoca, K. pneumoniae, Proteus sp.,  Serratia marcescens, Haemophilus influenzae,  Neisseria meningitidis, Pseudomonas aeruginosa, Candida  albicans, C. glabrata, C krusei, C parapsilosis,  C. tropicalis and the KPC resistance gene.    Organism Identification: Blood Culture PCR    Method Type: PCR

## 2019-06-08 NOTE — H&P ADULT - ASSESSMENT
68M with PMHx of HTN, Afib on Eliquis, SAVR (complicated by aortic aneurysm and infective endocarditis in 2011 and 2012) s/p PPM on daily doxycycline for ppx presents for weakness and fever which began the morning of presentation.      #Fevers unclear etiology, no sepsis poa  #Hx of AVR with infective endocarditis   - U/A negative, no cough and negative CXR, no skin or soft tissue infections, LFTs wnl, U/A negative  - F/u BCx  - Check TTE  - C/w IVF 100cc/hr  - ID evaluation  - Cardiology evaluation     #AFib   - c/w eliquis, BB     #DLD/HTN/BPH  - c/w home meds     DVT PPx: on eliquis   Dispo: From home  Full code   WIFE to bring list of home meds, please confirm in AM 68M with PMHx of HTN, Afib on Eliquis, SAVR (complicated by aortic aneurysm and infective endocarditis in 2011 and 2012) s/p PPM on daily doxycycline for ppx presents for weakness and fever which began the morning of presentation.      #Fevers unclear etiology, no sepsis poa  #Hx of AVR with infective endocarditis   - U/A negative, no cough and negative CXR, no skin or soft tissue infections, LFTs wnl, U/A negative  - F/u BCx  - Check TTE  - C/w IVF 100cc/hr  - C/w IV ABx empiric tx of IE, vanc, cefepime and gentamycin   - ID evaluation  - Cardiology evaluation     #AFib   - c/w eliquis, BB     #DLD/HTN/BPH  - c/w home meds     DVT PPx: on eliquis   Dispo: From home  Full code   WIFE to bring list of home meds, please confirm in AM 68M with PMHx of HTN, Afib on Eliquis, SAVR (complicated by aortic aneurysm and infective endocarditis in 2011 and 2012) s/p PPM on daily doxycycline for ppx presents for weakness and fever which began the morning of presentation.      #Fevers unclear etiology  #Hx of AVR with infective endocarditis   - U/A negative, no cough and negative CXR, no skin or soft tissue infections, LFTs wnl, U/A negative  - F/u BCx  - Check TTE  - C/w IVF 100cc/hr  - C/w IV ABx empiric tx of IE, vanc, cefepime and gentamycin   - ID evaluation  - Cardiology evaluation     #AFib   - c/w eliquis, BB     #DLD/HTN/BPH  - c/w home meds     DVT PPx: on eliquis   Dispo: From home  Full code   WIFE to bring list of home meds, please confirm in AM

## 2019-06-08 NOTE — ED PROVIDER NOTE - OBJECTIVE STATEMENT
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 pt states he woke up this morning feeling at his baseline. pt also admits to generalized myalgias and arthralgias. pt states his symptoms are similar to his previous episode of endocarditis, but less severe. pt last saw his cardiologist, Dr. Escobar 2 days ago and had a normal routine visit. minimal relief with tylenol taken pta. denies cp, leg swelling, neck pain, headache, palpitations, diaphoresis.

## 2019-06-08 NOTE — CHART NOTE - NSCHARTNOTEFT_GEN_A_CORE
Called by labs, blood culture positive for gram +ve cocci in pairs and chains in aerobic bottle.  continue with current antibiotics, being covered for gram +ve cocci.  2D echo is already ordered, will try to expedite. Cardiology is already consulted, may need MELVIN if TTE -ve.  ID consult pending Called by labs, blood culture positive for gram +ve cocci in pairs and chains in aerobic and anaerobic bottle.  continue with current antibiotics, being covered for gram +ve cocci.  2D echo is already ordered, will try to expedite. Cardiology is already consulted, may need MELVIN if TTE -ve.  ID consult pending

## 2019-06-08 NOTE — ED PROVIDER NOTE - PMH
Atrial fibrillation    Dyslipidemia    H/O: osteoarthritis    History of BPH    Hypertension    Infective endocarditis

## 2019-06-08 NOTE — ED PROVIDER NOTE - NS ED ROS FT
Review of Systems:  CONSTITUTIONAL: +fever, +generalized malaise  EYES: no photophobia, no blurred vision  ENT: no ear pain, no nasal discharge  RESPIRATORY: +ROBERT, no cough  CARDIAC: no chest pain, no palpitations  GI: no abd pain, no nausea, no vomiting, no diarrhea, no constipation,   : no dysuria; no hematuria,   MUSCULOSKELETAL: no joint paint  NEUROLOGIC: no headache,   PSYCH: no anxiety, non suicidal, non homicidal, no hallucination, no depression

## 2019-06-08 NOTE — H&P ADULT - NSICDXPASTSURGICALHX_GEN_ALL_CORE_FT
PAST SURGICAL HISTORY:  S/P AVR (aortic valve replacement) complicated by aortic aneurysm and infective endocarditis

## 2019-06-08 NOTE — ED PROVIDER NOTE - ATTENDING CONTRIBUTION TO CARE
69 yo m with pmh of endocarditis on prophylactic doxycycline daily, ppm, afib on eliqus, AVR, htn, presents with c/o fever and malaise.  started this afternoon.  pt says also has oliver.  pt reports similar sx to when he had endocarditis.  pt denies dysuria, cough, sore throat, ear pain.  no n/v/d.  no abd pain.  no cp.  exam: nad, ncat, perrl, eomi, mmm, rrr, systolic murmur, ctab, abd soft, nt,nd, obese aox3, imp: pt with fever and malaise, h/o endocarditis, will empirically start abx and admit to r/o endocarditis and other sources of sepsis.

## 2019-06-09 DIAGNOSIS — Z87.438 PERSONAL HISTORY OF OTHER DISEASES OF MALE GENITAL ORGANS: ICD-10-CM

## 2019-06-09 DIAGNOSIS — I33.0 ACUTE AND SUBACUTE INFECTIVE ENDOCARDITIS: ICD-10-CM

## 2019-06-09 DIAGNOSIS — I10 ESSENTIAL (PRIMARY) HYPERTENSION: ICD-10-CM

## 2019-06-09 DIAGNOSIS — E78.5 HYPERLIPIDEMIA, UNSPECIFIED: ICD-10-CM

## 2019-06-09 DIAGNOSIS — I48.91 UNSPECIFIED ATRIAL FIBRILLATION: ICD-10-CM

## 2019-06-09 LAB
ANION GAP SERPL CALC-SCNC: 11 MMOL/L — SIGNIFICANT CHANGE UP (ref 7–14)
BUN SERPL-MCNC: 12 MG/DL — SIGNIFICANT CHANGE UP (ref 10–20)
CALCIUM SERPL-MCNC: 8.1 MG/DL — LOW (ref 8.5–10.1)
CHLORIDE SERPL-SCNC: 107 MMOL/L — SIGNIFICANT CHANGE UP (ref 98–110)
CO2 SERPL-SCNC: 22 MMOL/L — SIGNIFICANT CHANGE UP (ref 17–32)
CREAT SERPL-MCNC: 1 MG/DL — SIGNIFICANT CHANGE UP (ref 0.7–1.5)
GLUCOSE SERPL-MCNC: 151 MG/DL — HIGH (ref 70–99)
GRAM STN FLD: SIGNIFICANT CHANGE UP
GRAM STN FLD: SIGNIFICANT CHANGE UP
HCT VFR BLD CALC: 37.6 % — LOW (ref 42–52)
HCV AB S/CO SERPL IA: 0.12 S/CO — SIGNIFICANT CHANGE UP (ref 0–0.99)
HCV AB SERPL-IMP: SIGNIFICANT CHANGE UP
HGB BLD-MCNC: 12.5 G/DL — LOW (ref 14–18)
MCHC RBC-ENTMCNC: 31.1 PG — HIGH (ref 27–31)
MCHC RBC-ENTMCNC: 33.2 G/DL — SIGNIFICANT CHANGE UP (ref 32–37)
MCV RBC AUTO: 93.5 FL — SIGNIFICANT CHANGE UP (ref 80–94)
NRBC # BLD: 0 /100 WBCS — SIGNIFICANT CHANGE UP (ref 0–0)
PLATELET # BLD AUTO: 104 K/UL — LOW (ref 130–400)
POTASSIUM SERPL-MCNC: 4.1 MMOL/L — SIGNIFICANT CHANGE UP (ref 3.5–5)
POTASSIUM SERPL-SCNC: 4.1 MMOL/L — SIGNIFICANT CHANGE UP (ref 3.5–5)
RBC # BLD: 4.02 M/UL — LOW (ref 4.7–6.1)
RBC # FLD: 13.3 % — SIGNIFICANT CHANGE UP (ref 11.5–14.5)
SODIUM SERPL-SCNC: 140 MMOL/L — SIGNIFICANT CHANGE UP (ref 135–146)
SPECIMEN SOURCE: SIGNIFICANT CHANGE UP
WBC # BLD: 7.79 K/UL — SIGNIFICANT CHANGE UP (ref 4.8–10.8)
WBC # FLD AUTO: 7.79 K/UL — SIGNIFICANT CHANGE UP (ref 4.8–10.8)

## 2019-06-09 RX ORDER — AMPICILLIN TRIHYDRATE 250 MG
2 CAPSULE ORAL EVERY 4 HOURS
Refills: 0 | Status: DISCONTINUED | OUTPATIENT
Start: 2019-06-09 | End: 2019-06-14

## 2019-06-09 RX ORDER — SIMETHICONE 80 MG/1
80 TABLET, CHEWABLE ORAL EVERY 8 HOURS
Refills: 0 | Status: DISCONTINUED | OUTPATIENT
Start: 2019-06-09 | End: 2019-06-14

## 2019-06-09 RX ORDER — AMPICILLIN TRIHYDRATE 250 MG
2 CAPSULE ORAL ONCE
Refills: 0 | Status: COMPLETED | OUTPATIENT
Start: 2019-06-09 | End: 2019-06-09

## 2019-06-09 RX ORDER — AMPICILLIN TRIHYDRATE 250 MG
CAPSULE ORAL
Refills: 0 | Status: DISCONTINUED | OUTPATIENT
Start: 2019-06-09 | End: 2019-06-14

## 2019-06-09 RX ORDER — IBUPROFEN 200 MG
400 TABLET ORAL ONCE
Refills: 0 | Status: DISCONTINUED | OUTPATIENT
Start: 2019-06-09 | End: 2019-06-10

## 2019-06-09 RX ORDER — IBUPROFEN 200 MG
600 TABLET ORAL ONCE
Refills: 0 | Status: COMPLETED | OUTPATIENT
Start: 2019-06-09 | End: 2019-06-09

## 2019-06-09 RX ADMIN — Medication 216 GRAM(S): at 18:11

## 2019-06-09 RX ADMIN — APIXABAN 5 MILLIGRAM(S): 2.5 TABLET, FILM COATED ORAL at 05:08

## 2019-06-09 RX ADMIN — SODIUM CHLORIDE 100 MILLILITER(S): 9 INJECTION INTRAMUSCULAR; INTRAVENOUS; SUBCUTANEOUS at 02:00

## 2019-06-09 RX ADMIN — Medication 100 MILLIGRAM(S): at 13:55

## 2019-06-09 RX ADMIN — Medication 600 MILLIGRAM(S): at 18:25

## 2019-06-09 RX ADMIN — Medication 300 MILLIGRAM(S): at 06:01

## 2019-06-09 RX ADMIN — ATORVASTATIN CALCIUM 40 MILLIGRAM(S): 80 TABLET, FILM COATED ORAL at 21:17

## 2019-06-09 RX ADMIN — Medication 300 MILLIGRAM(S): at 18:11

## 2019-06-09 RX ADMIN — Medication 650 MILLIGRAM(S): at 21:21

## 2019-06-09 RX ADMIN — APIXABAN 5 MILLIGRAM(S): 2.5 TABLET, FILM COATED ORAL at 18:13

## 2019-06-09 RX ADMIN — Medication 25 MILLIGRAM(S): at 18:13

## 2019-06-09 RX ADMIN — SIMETHICONE 80 MILLIGRAM(S): 80 TABLET, CHEWABLE ORAL at 21:17

## 2019-06-09 RX ADMIN — Medication 25 MILLIGRAM(S): at 05:08

## 2019-06-09 RX ADMIN — Medication 216 GRAM(S): at 10:30

## 2019-06-09 RX ADMIN — Medication 650 MILLIGRAM(S): at 06:03

## 2019-06-09 RX ADMIN — Medication 216 GRAM(S): at 05:08

## 2019-06-09 RX ADMIN — Medication 81 MILLIGRAM(S): at 13:51

## 2019-06-09 RX ADMIN — Medication 216 GRAM(S): at 01:20

## 2019-06-09 RX ADMIN — Medication 216 GRAM(S): at 13:54

## 2019-06-09 RX ADMIN — Medication 100 MILLIGRAM(S): at 06:01

## 2019-06-09 NOTE — PROGRESS NOTE ADULT - ASSESSMENT
>>> fever positive blood culture gram positive cocci in pairs and chain    >>> history of infective endocarditis       on Iv ampicillin  need to add vancomycin      cardiology consult possible MELVIN    infectious dis consult >>> fever positive blood culture gram positive cocci in pairs and chain    >>> history of infective endocarditis       on Iv ampicillin  need to add vancomycin      cardiology consult possible MELVIN    infectious dis consult      d/w wife

## 2019-06-09 NOTE — PROGRESS NOTE ADULT - ASSESSMENT
RODRICK IQBAL 68y Male  MRN#: [id number]   CODE STATUS: Full code      SUBJECTIVE  Patient is a 68y old Male who presented with a chief complaint of fever, weakness and dyspnea on exertion  Currently admitted to medicine with the primary diagnosis of sepsis secondary to bacteremia, rule out IE  Today is hospital day 2d, and this morning he is resting in bed and reports no overnight events.       OBJECTIVE  PAST MEDICAL & SURGICAL HISTORY  Infective endocarditis  History of BPH  Dyslipidemia  Hypertension  H/O: osteoarthritis  Atrial fibrillation  S/P AVR (aortic valve replacement): complicated by aortic aneurysm and infective endocarditis    ALLERGIES:  No Known Allergies      HOME MEDICATIONS:  HOME MEDICATIONS:  aspirin 81 mg oral tablet: 1 tab(s) orally once a day (08 Jun 2019 02:42)  Crestor 10 mg oral tablet: 1 tab(s) orally once a day (08 Jun 2019 02:41)  doxycycline hyclate 100 mg oral tablet: 1 tab(s) orally once a day (08 Jun 2019 02:42)  Eliquis 5 mg oral tablet: 1 tab(s) orally 2 times a day (08 Jun 2019 02:41)  fenofibrate 134 mg oral capsule: 1 cap(s) orally once a day (08 Jun 2019 02:41)  metoprolol succinate 25 mg oral tablet, extended release: 1 tab(s) orally 2 times a day (08 Jun 2019 02:41)  Uroxatral 10 mg oral tablet, extended release: 1 tab(s) orally once a day (08 Jun 2019 02:42)      MEDICATIONS:  STANDING MEDICATIONS  ampicillin  IVPB 2 Gram(s) IV Intermittent every 4 hours  ampicillin  IVPB      apixaban 5 milliGRAM(s) Oral every 12 hours  aspirin enteric coated 81 milliGRAM(s) Oral daily  atorvastatin 40 milliGRAM(s) Oral at bedtime  gentamicin   IVPB 100 milliGRAM(s) IV Intermittent every 8 hours  metoprolol tartrate 25 milliGRAM(s) Oral two times a day  sodium chloride 0.9%. 1000 milliLiter(s) IV Continuous <Continuous>  vancomycin  IVPB 1500 milliGRAM(s) IV Intermittent every 12 hours    PRN MEDICATIONS  acetaminophen   Tablet .. 650 milliGRAM(s) Oral every 6 hours PRN  ibuprofen  Tablet. 400 milliGRAM(s) Oral once PRN      VITAL SIGNS: Last 24 Hours  T(C): 36.8 (09 Jun 2019 06:19), Max: 38.1 (08 Jun 2019 23:48)  T(F): 98.2 (09 Jun 2019 06:19), Max: 100.6 (08 Jun 2019 23:48)  HR: 71 (09 Jun 2019 06:19) (70 - 82)  BP: 114/61 (09 Jun 2019 06:19) (109/64 - 116/59)  BP(mean): --  RR: 18 (09 Jun 2019 06:19) (17 - 20)  SpO2: 97% (08 Jun 2019 21:24) (97% - 97%)    LABS:                        12.8   13.65 )-----------( 107      ( 08 Jun 2019 06:05 )             38.3     06-08    140  |  110  |  19  ----------------------------<  143<H>  4.2   |  22  |  1.2    Ca    8.2<L>      08 Jun 2019 06:05    TPro  5.5<L>  /  Alb  3.5  /  TBili  0.9  /  DBili  x   /  AST  44<H>  /  ALT  41  /  AlkPhos  50  06-08    PT/INR - ( 07 Jun 2019 22:45 )   PT: 20.40 sec;   INR: 1.78 ratio         PTT - ( 07 Jun 2019 22:45 )  PTT:35.6 sec  Urinalysis Basic - ( 08 Jun 2019 00:15 )    Color: Dark Yellow / Appearance: Clear / SG: >=1.030 / pH: x  Gluc: x / Ketone: Trace  / Bili: Small / Urobili: 1.0   Blood: x / Protein: Trace / Nitrite: Negative   Leuk Esterase: Trace / RBC: x / WBC 3-5 /HPF   Sq Epi: x / Non Sq Epi: Few /HPF / Bacteria: occ /HPF    Culture - Blood (collected 07 Jun 2019 22:45)  Source: .Blood Blood  Gram Stain (08 Jun 2019 14:23):    Growth in aerobic and anaerobic bottles: Gram Positive Cocci in Pairs and    Chains  Preliminary Report (08 Jun 2019 14:23):    Growth in aerobic and anaerobic bottles: Gram Positive Cocci in Pairs and    Chains    Culture - Blood (collected 07 Jun 2019 22:45)  Source: .Blood Blood  Gram Stain (08 Jun 2019 14:29):    Growth in aerobic bottle: Gram Positive Cocci in Pairs and Chains    Growth in anaerobic bottle: Gram Positive Cocci in Pairs and Chains  Preliminary Report (08 Jun 2019 14:29):    Growth in aerobic bottle: Gram Positive Cocci in Pairs and Chains    Growth in anaerobic bottle: Gram Positive Cocci in Pairs and Chains    "Due to technical problems, Proteus sp. will Not be reported as part of    the BCID panel until further notice"    ***Blood Panel PCR results on this specimen are available    approximately 3 hours after the Gram stain result.***    Gram stain, PCR, and/or culture results may not always    correspond due to difference in methodologies.    ************************************************************    This PCR assay was performed using [employer] [employer].    The following targets are tested for: Enterococcus,    vancomycin resistant enterococci, Listeria monocytogenes,    coagulase negative staphylococci, S. aureus,    methicillin resistant S. aureus, Streptococcus agalactiae    (Group B), S. pneumoniae, S. pyogenes (Group A),    Acinetobacter baumannii, Enterobacter cloacae, E. coli,    Klebsiella oxytoca, K. pneumoniae, Proteus sp.,    Serratia marcescens, Haemophilus influenzae,    Neisseria meningitidis, Pseudomonas aeruginosa, Candida    albicans, C. glabrata, C krusei, C parapsilosis,    C. tropicalis and the KPC resistance gene.  Organism: Blood Culture PCR (08 Jun 2019 15:31)  Organism: Blood Culture PCR (08 Jun 2019 15:31)    CARDIAC MARKERS ( 07 Jun 2019 22:45 )  x     / <0.01 ng/mL / x     / x     / x        RADIOLOGY:    < from: Xray Chest 1 View-PORTABLE IMMEDIATE (06.07.19 @ 23:45) >  1. Linear subsegmental atelectasis/scarring at the left base.  2. Otherwise, no radiographic evidence of acute cardiopulmonary disease.    < end of copied text >    < from: Transthoracic Echocardiogram (06.08.19 @ 17:12) >   1. Normal global left ventricular systolic function.   2. LV Ejection Fractionby Keith's Method with a biplane EF of 64 %.   3. Elevated left atrial and left ventricular end-diastolic pressures.   4. Mild concentric left ventricular hypertrophy.   5. Mildly increased LV wall thickness.   6. Normal left ventricular internal cavity size.   7. Spectral Doppler shows pseudonormal pattern of left ventricular   myocardial filling (Grade II diastolic dysfunction).   8. 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   9. LA volume Index is 35.4 ml/m² ml/m2.    < end of copied text >      PHYSICAL EXAM:    GENERAL: NAD, well-developed, AAOx3  HEENT:  Atraumatic, Normocephalic. EOMI, PERRLA, conjunctiva and sclera clear, No JVD  PULMONARY: Clear to auscultation bilaterally; No wheeze  CARDIOVASCULAR: Regular rate and rhythm; No murmurs, rubs, or gallops  GASTROINTESTINAL: Soft, Nontender, Nondistended; Bowel sounds present  MUSCULOSKELETAL:  2+ Peripheral Pulses, No clubbing, cyanosis, or edema  NEUROLOGY: non-focal  SKIN: No rashes or lesions      ADMISSION SUMMARY  Patient is a 68y old Male who presented with a chief complaint of fever, weakness and dyspnea on exertion  Currently admitted to medicine with the primary diagnosis of sepsis secondary to bacteremia, rule out IE    ASSESSMENT & PLAN  68M with PMHx of HTN, Afib on Eliquis, SAVR (complicated by aortic aneurysm and infective endocarditis in 2011 and 2012) s/p PPM on daily doxycycline for ppx presents for weakness and fever which began the morning of presentation.      # Sepsis secondary to enterococcus bacteremia, rule out infective endocarditis  - U/A negative, CXR negative, no skin or soft tissue infections  - BCx shows enterococcus (pending sensititivities)  - TTE 6/8 shows EF 64%, G1DD, no valvular vegetation   - Continue with IVF 100cc/hr  - Abx switched to ampicillin 2g Q4hrs, gentamicin 100mg q8hrs, and vancomyin 1500mg q12hrs as per ID (Dr. Sanchez)  - Cardiology evaluation for possible MELVIN due to history of IE and history of AVR    # Chronic AFib  - CHADsVASc 2 (Age, HTN)  - Currently at paced rhythm on EKG  - Continue BB and Eliquis    # DLD  - Continue Lipitor 40mg and ASA    # HTN  - Continue metoprolol    # BPH  - will confirm med with wife today      DVT ppx: Eliquis  GI ppx: Not indicated  Diet: DASH  Activity: ambulate as tolerated  Lines: Peripheral IVs  Code status: Full code  Dispo: acute RODRICK IQBAL 68y Male  MRN#: [id number]   CODE STATUS: Full code      SUBJECTIVE  Patient is a 68y old Male who presented with a chief complaint of fever, weakness and dyspnea on exertion  Currently admitted to medicine with the primary diagnosis of sepsis secondary to bacteremia, rule out IE  Today is hospital day 2d, and this morning he is resting in bed and reports no overnight events. Patient reports subjective fever today but recorded temp has been afebrile. Patient also reports poor appetite and nocturnal headache. Denies dizziness, chest pain, shortness of breath, abdominal pain, urinary symptoms.       OBJECTIVE  PAST MEDICAL & SURGICAL HISTORY  Infective endocarditis  History of BPH  Dyslipidemia  Hypertension  H/O: osteoarthritis  Atrial fibrillation  S/P AVR (aortic valve replacement): complicated by aortic aneurysm and infective endocarditis    ALLERGIES:  No Known Allergies      HOME MEDICATIONS:  HOME MEDICATIONS:  aspirin 81 mg oral tablet: 1 tab(s) orally once a day (08 Jun 2019 02:42)  Crestor 10 mg oral tablet: 1 tab(s) orally once a day (08 Jun 2019 02:41)  doxycycline hyclate 100 mg oral tablet: 1 tab(s) orally once a day (08 Jun 2019 02:42)  Eliquis 5 mg oral tablet: 1 tab(s) orally 2 times a day (08 Jun 2019 02:41)  fenofibrate 134 mg oral capsule: 1 cap(s) orally once a day (08 Jun 2019 02:41)  metoprolol succinate 25 mg oral tablet, extended release: 1 tab(s) orally 2 times a day (08 Jun 2019 02:41)  Uroxatral 10 mg oral tablet, extended release: 1 tab(s) orally once a day (08 Jun 2019 02:42)      MEDICATIONS:  STANDING MEDICATIONS  ampicillin  IVPB 2 Gram(s) IV Intermittent every 4 hours  ampicillin  IVPB      apixaban 5 milliGRAM(s) Oral every 12 hours  aspirin enteric coated 81 milliGRAM(s) Oral daily  atorvastatin 40 milliGRAM(s) Oral at bedtime  gentamicin   IVPB 100 milliGRAM(s) IV Intermittent every 8 hours  metoprolol tartrate 25 milliGRAM(s) Oral two times a day  sodium chloride 0.9%. 1000 milliLiter(s) IV Continuous <Continuous>  vancomycin  IVPB 1500 milliGRAM(s) IV Intermittent every 12 hours    PRN MEDICATIONS  acetaminophen   Tablet .. 650 milliGRAM(s) Oral every 6 hours PRN  ibuprofen  Tablet. 400 milliGRAM(s) Oral once PRN      VITAL SIGNS: Last 24 Hours  T(C): 36.8 (09 Jun 2019 06:19), Max: 38.1 (08 Jun 2019 23:48)  T(F): 98.2 (09 Jun 2019 06:19), Max: 100.6 (08 Jun 2019 23:48)  HR: 71 (09 Jun 2019 06:19) (70 - 82)  BP: 114/61 (09 Jun 2019 06:19) (109/64 - 116/59)  BP(mean): --  RR: 18 (09 Jun 2019 06:19) (17 - 20)  SpO2: 97% (08 Jun 2019 21:24) (97% - 97%)    LABS:                        12.8   13.65 )-----------( 107      ( 08 Jun 2019 06:05 )             38.3     06-08    140  |  110  |  19  ----------------------------<  143<H>  4.2   |  22  |  1.2    Ca    8.2<L>      08 Jun 2019 06:05    TPro  5.5<L>  /  Alb  3.5  /  TBili  0.9  /  DBili  x   /  AST  44<H>  /  ALT  41  /  AlkPhos  50  06-08    PT/INR - ( 07 Jun 2019 22:45 )   PT: 20.40 sec;   INR: 1.78 ratio         PTT - ( 07 Jun 2019 22:45 )  PTT:35.6 sec  Urinalysis Basic - ( 08 Jun 2019 00:15 )    Color: Dark Yellow / Appearance: Clear / SG: >=1.030 / pH: x  Gluc: x / Ketone: Trace  / Bili: Small / Urobili: 1.0   Blood: x / Protein: Trace / Nitrite: Negative   Leuk Esterase: Trace / RBC: x / WBC 3-5 /HPF   Sq Epi: x / Non Sq Epi: Few /HPF / Bacteria: occ /HPF    Culture - Blood (collected 07 Jun 2019 22:45)  Source: .Blood Blood  Gram Stain (08 Jun 2019 14:23):    Growth in aerobic and anaerobic bottles: Gram Positive Cocci in Pairs and    Chains  Preliminary Report (08 Jun 2019 14:23):    Growth in aerobic and anaerobic bottles: Gram Positive Cocci in Pairs and    Chains    Culture - Blood (collected 07 Jun 2019 22:45)  Source: .Blood Blood  Gram Stain (08 Jun 2019 14:29):    Growth in aerobic bottle: Gram Positive Cocci in Pairs and Chains    Growth in anaerobic bottle: Gram Positive Cocci in Pairs and Chains  Preliminary Report (08 Jun 2019 14:29):    Growth in aerobic bottle: Gram Positive Cocci in Pairs and Chains    Growth in anaerobic bottle: Gram Positive Cocci in Pairs and Chains    "Due to technical problems, Proteus sp. will Not be reported as part of    the BCID panel until further notice"    ***Blood Panel PCR results on this specimen are available    approximately 3 hours after the Gram stain result.***    Gram stain, PCR, and/or culture results may not always    correspond due to difference in methodologies.    ************************************************************    This PCR assay was performed using [employer].    The following targets are tested for: Enterococcus,    vancomycin resistant enterococci, Listeria monocytogenes,    coagulase negative staphylococci, S. aureus,    methicillin resistant S. aureus, Streptococcus agalactiae    (Group B), S. pneumoniae, S. pyogenes (Group A),    Acinetobacter baumannii, Enterobacter cloacae, E. coli,    Klebsiella oxytoca, K. pneumoniae, Proteus sp.,    Serratia marcescens, Haemophilus influenzae,    Neisseria meningitidis, Pseudomonas aeruginosa, Candida    albicans, C. glabrata, C krusei, C parapsilosis,    C. tropicalis and the KPC resistance gene.  Organism: Blood Culture PCR (08 Jun 2019 15:31)  Organism: Blood Culture PCR (08 Jun 2019 15:31)    CARDIAC MARKERS ( 07 Jun 2019 22:45 )  x     / <0.01 ng/mL / x     / x     / x        RADIOLOGY:    < from: Xray Chest 1 View-PORTABLE IMMEDIATE (06.07.19 @ 23:45) >  1. Linear subsegmental atelectasis/scarring at the left base.  2. Otherwise, no radiographic evidence of acute cardiopulmonary disease.    < end of copied text >    < from: Transthoracic Echocardiogram (06.08.19 @ 17:12) >   1. Normal global left ventricular systolic function.   2. LV Ejection Fractionby Keith's Method with a biplane EF of 64 %.   3. Elevated left atrial and left ventricular end-diastolic pressures.   4. Mild concentric left ventricular hypertrophy.   5. Mildly increased LV wall thickness.   6. Normal left ventricular internal cavity size.   7. Spectral Doppler shows pseudonormal pattern of left ventricular   myocardial filling (Grade II diastolic dysfunction).   8. 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   9. LA volume Index is 35.4 ml/m² ml/m2.    < end of copied text >      PHYSICAL EXAM:    GENERAL: Mildly lethargic, well-developed, AAOx3  HEENT:  Atraumatic, Normocephalic. Conjunctiva pink and cornea white, No JVD  PULMONARY: Clear to auscultation bilaterally; No wheeze  CARDIOVASCULAR: Regular rate and rhythm; Loud S1S2 with grade 2/6 systolic murmurs best heard at RUSB  GASTROINTESTINAL: Obese abdomen, soft, nontender, nondistended; Bowel sounds present  EXTREMITIES:  2+ Peripheral Pulses, No petal edema; no splinter hemorrhage, no tender nodules on palms  NEUROLOGY: non-focal  SKIN: No rashes or lesions      ADMISSION SUMMARY  Patient is a 68y old Male who presented with a chief complaint of fever, weakness and dyspnea on exertion  Currently admitted to medicine with the primary diagnosis of sepsis secondary to bacteremia, rule out IE    ASSESSMENT & PLAN  68M with PMHx of HTN, Afib on Eliquis, SAVR (complicated by aortic aneurysm and infective endocarditis in 2011 and 2012) s/p PPM on daily doxycycline for ppx presents for weakness and fever which began the morning of presentation.      # Sepsis secondary to enterococcus bacteremia, rule out infective endocarditis  - U/A negative, CXR negative, no skin or soft tissue infections; patient has been on daily doxy 100mg since 2012 suggested by Cardiologist (Dr. Foster)  - BCx 6/7 shows enterococcus (pending sensititivities); BCx 6/8 shows G+C in pairs and chains  - TTE 6/8 shows EF 64%, G1DD, no valvular vegetation   - Abx switched to ampicillin 2g Q4hrs, gentamicin 100mg q8hrs, and vancomycin 1500mg q12hrs as per ID (Dr. Sanchez)  - Cardiology evaluation for possible MELVIN tomorrow due to history of IE and history of AVR  - Continue daily blood culture  - NPO at MN, keep on IVF 100cc    # Chronic AFib  - CHADsVASc 2 (Age, HTN)  - Currently at paced rhythm on EKG  - Continue BB and Eliquis    # DLD  - Continue Lipitor 40mg and ASA    # HTN  - Continue metoprolol    # BPH  - will confirm med with wife today      DVT ppx: Eliquis  GI ppx: Not indicated  Diet: DASH  Activity: ambulate as tolerated  Lines: Peripheral IVs  Code status: Full code  Dispo: acute, pending MELVIN tomorrow RODRICK IQBAL 68y Male  MRN#: [id number]   CODE STATUS: Full code      SUBJECTIVE  Patient is a 68y old Male who presented with a chief complaint of fever, weakness and dyspnea on exertion  Currently admitted to medicine with the primary diagnosis of sepsis secondary to bacteremia, rule out IE  Today is hospital day 2d, and this morning he is resting in bed and reports no overnight events. Patient reports subjective fever today but recorded temp has been afebrile. Patient also reports poor appetite and nocturnal headache. Denies dizziness, chest pain, shortness of breath, abdominal pain, urinary symptoms.       OBJECTIVE  PAST MEDICAL & SURGICAL HISTORY  Infective endocarditis  History of BPH  Dyslipidemia  Hypertension  H/O: osteoarthritis  Atrial fibrillation  S/P AVR (aortic valve replacement): complicated by aortic aneurysm and infective endocarditis    ALLERGIES:  No Known Allergies      HOME MEDICATIONS:  HOME MEDICATIONS:  aspirin 81 mg oral tablet: 1 tab(s) orally once a day (08 Jun 2019 02:42)  Crestor 10 mg oral tablet: 1 tab(s) orally once a day (08 Jun 2019 02:41)  doxycycline hyclate 100 mg oral tablet: 1 tab(s) orally once a day (08 Jun 2019 02:42)  Eliquis 5 mg oral tablet: 1 tab(s) orally 2 times a day (08 Jun 2019 02:41)  fenofibrate 134 mg oral capsule: 1 cap(s) orally once a day (08 Jun 2019 02:41)  metoprolol succinate 25 mg oral tablet, extended release: 1 tab(s) orally 2 times a day (08 Jun 2019 02:41)  Uroxatral 10 mg oral tablet, extended release: 1 tab(s) orally once a day (08 Jun 2019 02:42)      MEDICATIONS:  STANDING MEDICATIONS  ampicillin  IVPB 2 Gram(s) IV Intermittent every 4 hours  ampicillin  IVPB      apixaban 5 milliGRAM(s) Oral every 12 hours  aspirin enteric coated 81 milliGRAM(s) Oral daily  atorvastatin 40 milliGRAM(s) Oral at bedtime  gentamicin   IVPB 100 milliGRAM(s) IV Intermittent every 8 hours  metoprolol tartrate 25 milliGRAM(s) Oral two times a day  sodium chloride 0.9%. 1000 milliLiter(s) IV Continuous <Continuous>  vancomycin  IVPB 1500 milliGRAM(s) IV Intermittent every 12 hours    PRN MEDICATIONS  acetaminophen   Tablet .. 650 milliGRAM(s) Oral every 6 hours PRN  ibuprofen  Tablet. 400 milliGRAM(s) Oral once PRN      VITAL SIGNS: Last 24 Hours  T(C): 36.8 (09 Jun 2019 06:19), Max: 38.1 (08 Jun 2019 23:48)  T(F): 98.2 (09 Jun 2019 06:19), Max: 100.6 (08 Jun 2019 23:48)  HR: 71 (09 Jun 2019 06:19) (70 - 82)  BP: 114/61 (09 Jun 2019 06:19) (109/64 - 116/59)  BP(mean): --  RR: 18 (09 Jun 2019 06:19) (17 - 20)  SpO2: 97% (08 Jun 2019 21:24) (97% - 97%)    LABS:                        12.8   13.65 )-----------( 107      ( 08 Jun 2019 06:05 )             38.3     06-08    140  |  110  |  19  ----------------------------<  143<H>  4.2   |  22  |  1.2    Ca    8.2<L>      08 Jun 2019 06:05    TPro  5.5<L>  /  Alb  3.5  /  TBili  0.9  /  DBili  x   /  AST  44<H>  /  ALT  41  /  AlkPhos  50  06-08    PT/INR - ( 07 Jun 2019 22:45 )   PT: 20.40 sec;   INR: 1.78 ratio         PTT - ( 07 Jun 2019 22:45 )  PTT:35.6 sec  Urinalysis Basic - ( 08 Jun 2019 00:15 )    Color: Dark Yellow / Appearance: Clear / SG: >=1.030 / pH: x  Gluc: x / Ketone: Trace  / Bili: Small / Urobili: 1.0   Blood: x / Protein: Trace / Nitrite: Negative   Leuk Esterase: Trace / RBC: x / WBC 3-5 /HPF   Sq Epi: x / Non Sq Epi: Few /HPF / Bacteria: occ /HPF    Culture - Blood (collected 07 Jun 2019 22:45)  Source: .Blood Blood  Gram Stain (08 Jun 2019 14:23):    Growth in aerobic and anaerobic bottles: Gram Positive Cocci in Pairs and    Chains  Preliminary Report (08 Jun 2019 14:23):    Growth in aerobic and anaerobic bottles: Gram Positive Cocci in Pairs and    Chains    Culture - Blood (collected 07 Jun 2019 22:45)  Source: .Blood Blood  Gram Stain (08 Jun 2019 14:29):    Growth in aerobic bottle: Gram Positive Cocci in Pairs and Chains    Growth in anaerobic bottle: Gram Positive Cocci in Pairs and Chains  Preliminary Report (08 Jun 2019 14:29):    Growth in aerobic bottle: Gram Positive Cocci in Pairs and Chains    Growth in anaerobic bottle: Gram Positive Cocci in Pairs and Chains    "Due to technical problems, Proteus sp. will Not be reported as part of    the BCID panel until further notice"    ***Blood Panel PCR results on this specimen are available    approximately 3 hours after the Gram stain result.***    Gram stain, PCR, and/or culture results may not always    correspond due to difference in methodologies.    ************************************************************    This PCR assay was performed using [employer].    The following targets are tested for: Enterococcus,    vancomycin resistant enterococci, Listeria monocytogenes,    coagulase negative staphylococci, S. aureus,    methicillin resistant S. aureus, Streptococcus agalactiae    (Group B), S. pneumoniae, S. pyogenes (Group A),    Acinetobacter baumannii, Enterobacter cloacae, E. coli,    Klebsiella oxytoca, K. pneumoniae, Proteus sp.,    Serratia marcescens, Haemophilus influenzae,    Neisseria meningitidis, Pseudomonas aeruginosa, Candida    albicans, C. glabrata, C krusei, C parapsilosis,    C. tropicalis and the KPC resistance gene.  Organism: Blood Culture PCR (08 Jun 2019 15:31)  Organism: Blood Culture PCR (08 Jun 2019 15:31)    CARDIAC MARKERS ( 07 Jun 2019 22:45 )  x     / <0.01 ng/mL / x     / x     / x        RADIOLOGY:    < from: Xray Chest 1 View-PORTABLE IMMEDIATE (06.07.19 @ 23:45) >  1. Linear subsegmental atelectasis/scarring at the left base.  2. Otherwise, no radiographic evidence of acute cardiopulmonary disease.    < end of copied text >    < from: Transthoracic Echocardiogram (06.08.19 @ 17:12) >   1. Normal global left ventricular systolic function.   2. LV Ejection Fractionby Keith's Method with a biplane EF of 64 %.   3. Elevated left atrial and left ventricular end-diastolic pressures.   4. Mild concentric left ventricular hypertrophy.   5. Mildly increased LV wall thickness.   6. Normal left ventricular internal cavity size.   7. Spectral Doppler shows pseudonormal pattern of left ventricular   myocardial filling (Grade II diastolic dysfunction).   8. 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   9. LA volume Index is 35.4 ml/m² ml/m2.    < end of copied text >      PHYSICAL EXAM:    GENERAL: Mildly lethargic, well-developed, AAOx3  HEENT:  Atraumatic, Normocephalic. Conjunctiva pink and cornea white, No JVD  PULMONARY: Clear to auscultation bilaterally; No wheeze  CARDIOVASCULAR: Regular rate and rhythm; Loud S1S2 with grade 2/6 systolic murmurs best heard at RUSB  GASTROINTESTINAL: Obese abdomen, soft, nontender, nondistended; Bowel sounds present  EXTREMITIES:  2+ Peripheral Pulses, No petal edema; no splinter hemorrhage, no tender nodules on palms  NEUROLOGY: non-focal  SKIN: No rashes or lesions      ADMISSION SUMMARY  Patient is a 68y old Male who presented with a chief complaint of fever, weakness and dyspnea on exertion  Currently admitted to medicine with the primary diagnosis of sepsis secondary to bacteremia, rule out IE    ASSESSMENT & PLAN  68M with PMHx of HTN, Afib on Eliquis, SAVR (complicated by aortic aneurysm and infective endocarditis in 2011 and 2012) s/p PPM on daily doxycycline for ppx presents for weakness and fever which began the morning of presentation.      # Sepsis secondary to enterococcus bacteremia, rule out infective endocarditis  - U/A negative, CXR negative, no skin or soft tissue infections; patient has been on daily doxy 100mg since 2012 suggested by Cardiologist (Dr. Foster)  - BCx 6/7 shows enterococcus (pending sensititivities); BCx 6/8 shows G+C in pairs and chains  - TTE 6/8 shows EF 64%, G1DD, no valvular vegetation   - Abx switched to ampicillin 2g Q4hrs, gentamicin 100mg q8hrs, and vancomycin 1500mg q12hrs as per ID (Dr. Sanchez)  - Continue daily blood culture  - NPO at MN, keep on IVF 100cc  - Cardio recs appreciated (Dr. Escobedo): MELVIN tomorrow, EP eval for PPM    # Chronic AFib with PPM  - CHADsVASc 2 (Age, HTN)  - Currently at paced rhythm on EKG  - Continue BB and Eliquis  - Consult EP for PPM evaluation    # DLD  - Continue Lipitor 40mg and ASA    # HTN  - Continue metoprolol    # BPH  - will confirm med with wife today      DVT ppx: Eliquis  GI ppx: Not indicated  Diet: DASH  Activity: ambulate as tolerated  Lines: Peripheral IVs  Code status: Full code  Dispo: acute, pending MELVIN tomorrow

## 2019-06-09 NOTE — CONSULT NOTE ADULT - SUBJECTIVE AND OBJECTIVE BOX
Patient is a 68y old  Male who presents with a chief complaint of Fever, weakness and dyspnea on exertion (09 Jun 2019 11:28)      HPI:  68M with PMHx of HTN, BPH, Afib on Eliquis, SAVR (complicated by aortic aneurysm and infective endocarditis in 2011 and 2012) s/p PPM on daily doxycycline for ppx presents for weakness and fever which began the morning of presentation. Patient states he developed sudden onset weakness of lower extremities 6/7and feelings of lethargy. Symptoms associated with increased shortness of breath on exertion and subjective fever. He has mild headache but denies any changes in vision or nuchal rigidity. Patient denies any cough, cp, palpitations, nausea, vomiting, diarrhea, constipation, dysuria, abdominal pain, recent travel, recent sick contact.  pt did scrape his 2nd rt metatarsal at [place] 2 to 3 weeks ago but has completely  healed. . Of note patient had 3rd surgical AVR after developing infective endocarditis in 2012. At that time his symptoms were worse. He had severe headache, finger pain with splinter hemorrhage. He was placed on daily doxycycline by cardiology since 2012.     In ED: Vitals T 101.6, 125/65, HR 90, WBC 15K, received x1 Vancomycin, gentamycin, cefepime (08 Jun 2019 02:03)      PAST MEDICAL & SURGICAL HISTORY:  Infective endocarditis  History of BPH  Dyslipidemia  Hypertension  H/O: osteoarthritis  Atrial fibrillation  S/P AVR (aortic valve replacement): complicated by aortic aneurysm and infective endocarditis      PREVIOUS DIAGNOSTIC TESTING:      ECHO< from: Transthoracic Echocardiogram (06.08.19 @ 17:12) >  Summary:   1. Normal global left ventricular systolic function.   2. LV Ejection Fractionby Keith's Method with a biplane EF of 64 %.   3. Elevated left atrial and left ventricular end-diastolic pressures.   4. Mild concentric left ventricular hypertrophy.   5. Mildly increased LV wall thickness.   6. Normal left ventricular internal cavity size.   7. Spectral Doppler shows pseudonormal pattern of left ventricular   myocardial filling (Grade II diastolic dysfunction).   8. 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   9. LA volume Index is 35.4 ml/m² ml/m2.        MEDICATIONS  (STANDING):  ampicillin  IVPB 2 Gram(s) IV Intermittent every 4 hours  ampicillin  IVPB      apixaban 5 milliGRAM(s) Oral every 12 hours  aspirin enteric coated 81 milliGRAM(s) Oral daily  atorvastatin 40 milliGRAM(s) Oral at bedtime  gentamicin   IVPB 100 milliGRAM(s) IV Intermittent every 8 hours  metoprolol tartrate 25 milliGRAM(s) Oral two times a day  sodium chloride 0.9%. 1000 milliLiter(s) (100 mL/Hr) IV Continuous <Continuous>  vancomycin  IVPB 1500 milliGRAM(s) IV Intermittent every 12 hours    MEDICATIONS  (PRN):  acetaminophen   Tablet .. 650 milliGRAM(s) Oral every 6 hours PRN Temp greater or equal to 38C (100.4F)  ibuprofen  Tablet. 400 milliGRAM(s) Oral once PRN Temp greater or equal to 38C (100.4F)      FAMILY HISTORY:      SOCIAL HISTORY:  CIGARETTES: former smoker  ALCOHOL: none  DRUGS: none                      REVIEW OF SYSTEMS:  CONSTITUTIONAL: No distress, Looks stable  NECK: No pain or stiffness  RESPIRATORY: No cough, wheezing, (+)shortness of breath  CARDIOVASCULAR: No chest pain,(+) SOB, palpitations, leg swelling  GASTROINTESTINAL: No abdominal or epigastric pain. No nausea, vomiting, or hematemesis;  No melena.  NEUROLOGICAL: No dizziness, (+)headaches, memory loss, loss of strength  SKIN: No itching, burning, rashes, or lesions   ENDOCRINE: No heat or cold intolerance  MUSCULOSKELETAL: No joint pain, No  swelling; No muscle pain  PSYCHIATRIC: No depression, anxiety, mood swings, or difficulty sleeping  ALLERGY: No hives, itching, rash          Vital Signs Last 24 Hrs  T(C): 36.8 (09 Jun 2019 06:19), Max: 38.1 (08 Jun 2019 23:48)  T(F): 98.2 (09 Jun 2019 06:19), Max: 100.6 (08 Jun 2019 23:48)  HR: 71 (09 Jun 2019 06:19) (70 - 73)  BP: 114/61 (09 Jun 2019 06:19) (109/64 - 116/59)  BP(mean): --  RR: 18 (09 Jun 2019 06:19) (18 - 20)  SpO2: 97% (08 Jun 2019 21:24) (97% - 97%)                      PHYSICAL EXAM:  GENERAL: No distress, well developed  HEAD:  Atraumatic, Normocephalic  NECK: Supple, No JVD, No Bruit of either carotid arteries  NERVOUS SYSTEM:  Alert, Awake, Oriented to time, place, person; Normal memory and speech; Normal motor Strength 5/5 B/L upper and lower extremities  CHEST/LUNG: Normal air entry to lung base bilaterally; No wheeze, crackle, rales, rhonchi  HEART: Regular heart beat, S1, A2, P2, No S3, No S4, No gallop, No murmur  ABDOMEN: Soft, Non tender, Non distended; Bowel sounds present  EXTREMITIES:  2+ Peripheral Pulses, No clubbing, No edema  SKIN: No rashes or lesions, no stigmata of endocarditis    TELEMETRY:  nsr  ECG:< from: 12 Lead ECG (06.07.19 @ 23:28) >  Diagnosis Line Sinus rhythm  Ventricular pacing  Abnormal ECG    < end of copied text >  CXRAY   < from: Xray Chest 1 View-PORTABLE IMMEDIATE (06.07.19 @ 23:45) >    Impression:  1. Linear subsegmental atelectasis/scarring at the left base.  2. Otherwise, no radiographic evidence of acute cardiopulmonary disease.    < end of copied text >    I&O's Detail    08 Jun 2019 07:01  -  09 Jun 2019 07:00  --------------------------------------------------------  IN:    Oral Fluid: 460 mL    sodium chloride 0.9%.: 1000 mL  Total IN: 1460 mL    OUT:    Voided: 200 mL  Total OUT: 200 mL    Total NET: 1260 mL          LABS:                        12.5   7.79  )-----------( 104      ( 09 Jun 2019 07:57 )             37.6     06-09    140  |  107  |  12  ----------------------------<  151<H>  4.1   |  22  |  1.0    Ca    8.1<L>      09 Jun 2019 07:57    TPro  5.5<L>  /  Alb  3.5  /  TBili  0.9  /  DBili  x   /  AST  44<H>  /  ALT  41  /  AlkPhos  50  06-08    CARDIAC MARKERS ( 07 Jun 2019 22:45 )  x     / <0.01 ng/mL / x     / x     / x          PT/INR - ( 07 Jun 2019 22:45 )   PT: 20.40 sec;   INR: 1.78 ratio         PTT - ( 07 Jun 2019 22:45 )  PTT:35.6 sec  Urinalysis Basic - ( 08 Jun 2019 00:15 )    Color: Dark Yellow / Appearance: Clear / SG: >=1.030 / pH: x  Gluc: x / Ketone: Trace  / Bili: Small / Urobili: 1.0   Blood: x / Protein: Trace / Nitrite: Negative   Leuk Esterase: Trace / RBC: x / WBC 3-5 /HPF   Sq Epi: x / Non Sq Epi: Few /HPF / Bacteria: occ /HPF      I&O's Summary    08 Jun 2019 07:01  -  09 Jun 2019 07:00  --------------------------------------------------------  IN: 1460 mL / OUT: 200 mL / NET: 1260 mL        RADIOLOGY & ADDITIONAL STUDIES:

## 2019-06-10 LAB
-  AMPICILLIN: SIGNIFICANT CHANGE UP
-  GENTAMICIN SYNERGY: SIGNIFICANT CHANGE UP
-  VANCOMYCIN: SIGNIFICANT CHANGE UP
ANION GAP SERPL CALC-SCNC: 11 MMOL/L — SIGNIFICANT CHANGE UP (ref 7–14)
BUN SERPL-MCNC: 9 MG/DL — LOW (ref 10–20)
CALCIUM SERPL-MCNC: 8.4 MG/DL — LOW (ref 8.5–10.1)
CHLORIDE SERPL-SCNC: 108 MMOL/L — SIGNIFICANT CHANGE UP (ref 98–110)
CO2 SERPL-SCNC: 23 MMOL/L — SIGNIFICANT CHANGE UP (ref 17–32)
CREAT SERPL-MCNC: 0.8 MG/DL — SIGNIFICANT CHANGE UP (ref 0.7–1.5)
CULTURE RESULTS: SIGNIFICANT CHANGE UP
ERYTHROCYTE [SEDIMENTATION RATE] IN BLOOD: 12 MM/HR — HIGH (ref 0–10)
GLUCOSE SERPL-MCNC: 92 MG/DL — SIGNIFICANT CHANGE UP (ref 70–99)
HCT VFR BLD CALC: 40.4 % — LOW (ref 42–52)
HGB BLD-MCNC: 13.6 G/DL — LOW (ref 14–18)
MAGNESIUM SERPL-MCNC: 2 MG/DL — SIGNIFICANT CHANGE UP (ref 1.8–2.4)
MCHC RBC-ENTMCNC: 31.3 PG — HIGH (ref 27–31)
MCHC RBC-ENTMCNC: 33.7 G/DL — SIGNIFICANT CHANGE UP (ref 32–37)
MCV RBC AUTO: 92.9 FL — SIGNIFICANT CHANGE UP (ref 80–94)
METHOD TYPE: SIGNIFICANT CHANGE UP
NRBC # BLD: 0 /100 WBCS — SIGNIFICANT CHANGE UP (ref 0–0)
ORGANISM # SPEC MICROSCOPIC CNT: SIGNIFICANT CHANGE UP
PLATELET # BLD AUTO: 122 K/UL — LOW (ref 130–400)
POTASSIUM SERPL-MCNC: 4.4 MMOL/L — SIGNIFICANT CHANGE UP (ref 3.5–5)
POTASSIUM SERPL-SCNC: 4.4 MMOL/L — SIGNIFICANT CHANGE UP (ref 3.5–5)
RBC # BLD: 4.35 M/UL — LOW (ref 4.7–6.1)
RBC # FLD: 13.2 % — SIGNIFICANT CHANGE UP (ref 11.5–14.5)
SODIUM SERPL-SCNC: 142 MMOL/L — SIGNIFICANT CHANGE UP (ref 135–146)
SPECIMEN SOURCE: SIGNIFICANT CHANGE UP
VANCOMYCIN TROUGH SERPL-MCNC: 15.8 UG/ML — HIGH (ref 5–10)
WBC # BLD: 7.84 K/UL — SIGNIFICANT CHANGE UP (ref 4.8–10.8)
WBC # FLD AUTO: 7.84 K/UL — SIGNIFICANT CHANGE UP (ref 4.8–10.8)

## 2019-06-10 PROCEDURE — 93288 INTERROG EVL PM/LDLS PM IP: CPT | Mod: 26

## 2019-06-10 RX ORDER — TAMSULOSIN HYDROCHLORIDE 0.4 MG/1
0.8 CAPSULE ORAL AT BEDTIME
Refills: 0 | Status: DISCONTINUED | OUTPATIENT
Start: 2019-06-10 | End: 2019-06-14

## 2019-06-10 RX ORDER — CEFTRIAXONE 500 MG/1
2 INJECTION, POWDER, FOR SOLUTION INTRAMUSCULAR; INTRAVENOUS EVERY 12 HOURS
Refills: 0 | Status: DISCONTINUED | OUTPATIENT
Start: 2019-06-10 | End: 2019-06-13

## 2019-06-10 RX ORDER — IBUPROFEN 200 MG
400 TABLET ORAL EVERY 6 HOURS
Refills: 0 | Status: DISCONTINUED | OUTPATIENT
Start: 2019-06-10 | End: 2019-06-14

## 2019-06-10 RX ADMIN — CEFTRIAXONE 100 GRAM(S): 500 INJECTION, POWDER, FOR SOLUTION INTRAMUSCULAR; INTRAVENOUS at 17:03

## 2019-06-10 RX ADMIN — Medication 216 GRAM(S): at 13:56

## 2019-06-10 RX ADMIN — Medication 216 GRAM(S): at 05:05

## 2019-06-10 RX ADMIN — SIMETHICONE 80 MILLIGRAM(S): 80 TABLET, CHEWABLE ORAL at 21:16

## 2019-06-10 RX ADMIN — Medication 216 GRAM(S): at 17:02

## 2019-06-10 RX ADMIN — Medication 650 MILLIGRAM(S): at 06:34

## 2019-06-10 RX ADMIN — Medication 400 MILLIGRAM(S): at 12:27

## 2019-06-10 RX ADMIN — SIMETHICONE 80 MILLIGRAM(S): 80 TABLET, CHEWABLE ORAL at 13:03

## 2019-06-10 RX ADMIN — APIXABAN 5 MILLIGRAM(S): 2.5 TABLET, FILM COATED ORAL at 17:03

## 2019-06-10 RX ADMIN — Medication 100 MILLIGRAM(S): at 01:09

## 2019-06-10 RX ADMIN — Medication 216 GRAM(S): at 10:12

## 2019-06-10 RX ADMIN — TAMSULOSIN HYDROCHLORIDE 0.8 MILLIGRAM(S): 0.4 CAPSULE ORAL at 21:15

## 2019-06-10 RX ADMIN — Medication 100 MILLIGRAM(S): at 05:05

## 2019-06-10 RX ADMIN — Medication 216 GRAM(S): at 04:28

## 2019-06-10 RX ADMIN — Medication 25 MILLIGRAM(S): at 05:05

## 2019-06-10 RX ADMIN — Medication 216 GRAM(S): at 00:33

## 2019-06-10 RX ADMIN — APIXABAN 5 MILLIGRAM(S): 2.5 TABLET, FILM COATED ORAL at 05:05

## 2019-06-10 RX ADMIN — ATORVASTATIN CALCIUM 40 MILLIGRAM(S): 80 TABLET, FILM COATED ORAL at 21:15

## 2019-06-10 RX ADMIN — Medication 300 MILLIGRAM(S): at 06:24

## 2019-06-10 RX ADMIN — Medication 81 MILLIGRAM(S): at 11:16

## 2019-06-10 RX ADMIN — Medication 300 MILLIGRAM(S): at 18:47

## 2019-06-10 RX ADMIN — Medication 400 MILLIGRAM(S): at 11:44

## 2019-06-10 RX ADMIN — Medication 25 MILLIGRAM(S): at 17:03

## 2019-06-10 RX ADMIN — Medication 216 GRAM(S): at 21:15

## 2019-06-10 NOTE — PROGRESS NOTE ADULT - ASSESSMENT
enteroccal bacteremia/ sepsis  prothetic av valve  chronic a fib  dyslip  htn  bph    eps  id eval   iv abx  gallium scan  continiue current rx

## 2019-06-10 NOTE — PRE-ANESTHESIA EVALUATION ADULT - NSANTHPMHFT_GEN_ALL_CORE
HTN, HLD, aortic aneurysm s/p AVR with aortic repair in ~2012 c/b infective endocarditis, afib s/p PPM on eliquis (device was replaced following treatment of IE), BPH, obesity, GERD. p/w bacteremia now here for MELVIN to r/o endocarditis

## 2019-06-10 NOTE — PROGRESS NOTE ADULT - ASSESSMENT
RODRICK IQBAL 68y Male  MRN#: [id number]   CODE STATUS: Full code      SUBJECTIVE  Patient is a 68y old Male who presented with a chief complaint of fever, weakness and dyspnea on exertion  Currently admitted to medicine with the primary diagnosis of sepsis secondary to bacteremia, IE ruled out  Today is hospital day 3d, and this morning he is resting in bed and reports no overnight events. Patient states that he sleep fairly well overnight but he needed to use the bathroom very frequently. Denies fever/chills, chest pain, shortness of breath, abdominal pain, urinary symptoms.       OBJECTIVE  PAST MEDICAL & SURGICAL HISTORY  Infective endocarditis  History of BPH  Dyslipidemia  Hypertension  H/O: osteoarthritis  Atrial fibrillation  S/P AVR (aortic valve replacement): complicated by aortic aneurysm and infective endocarditis    ALLERGIES:  No Known Allergies      HOME MEDICATIONS:  HOME MEDICATIONS:  aspirin 81 mg oral tablet: 1 tab(s) orally once a day (08 Jun 2019 02:42)  Crestor 10 mg oral tablet: 1 tab(s) orally once a day (08 Jun 2019 02:41)  doxycycline hyclate 100 mg oral tablet: 1 tab(s) orally once a day (08 Jun 2019 02:42)  Eliquis 5 mg oral tablet: 1 tab(s) orally 2 times a day (08 Jun 2019 02:41)  fenofibrate 134 mg oral capsule: 1 cap(s) orally once a day (08 Jun 2019 02:41)  metoprolol succinate 25 mg oral tablet, extended release: 1 tab(s) orally 2 times a day (08 Jun 2019 02:41)  Uroxatral 10 mg oral tablet, extended release: 1 tab(s) orally once a day (08 Jun 2019 02:42)      MEDICATIONS:  STANDING MEDICATIONS  ampicillin  IVPB 2 Gram(s) IV Intermittent every 4 hours  ampicillin  IVPB      apixaban 5 milliGRAM(s) Oral every 12 hours  aspirin enteric coated 81 milliGRAM(s) Oral daily  atorvastatin 40 milliGRAM(s) Oral at bedtime  gentamicin   IVPB 100 milliGRAM(s) IV Intermittent every 8 hours  metoprolol tartrate 25 milliGRAM(s) Oral two times a day  simethicone 80 milliGRAM(s) Chew every 8 hours  vancomycin  IVPB 1500 milliGRAM(s) IV Intermittent every 12 hours    PRN MEDICATIONS  acetaminophen   Tablet .. 650 milliGRAM(s) Oral every 6 hours PRN  ibuprofen  Tablet. 400 milliGRAM(s) Oral once PRN      VITAL SIGNS: Last 24 Hours  T(C): 35.9 (10 Ward 2019 08:16), Max: 36.8 (09 Jun 2019 22:13)  T(F): 96.6 (10 Ward 2019 05:54), Max: 98.2 (09 Jun 2019 22:13)  HR: 69 (10 Ward 2019 08:16) (69 - 73)  BP: 109/55 (10 Ward 2019 08:16) (109/55 - 117/63)  BP(mean): --  RR: 20 (10 Ward 2019 08:16) (18 - 20)  SpO2: 95% (10 Ward 2019 08:16) (95% - 95%)    LABS:                        13.6   7.84  )-----------( 122      ( 10 Ward 2019 06:19 )             40.4     06-10    142  |  108  |  9<L>  ----------------------------<  92  4.4   |  23  |  0.8    Ca    8.4<L>      10 Ward 2019 06:19  Mg     2.0     06-10      Culture - Blood (collected 08 Jun 2019 06:05)  Source: .Blood None  Gram Stain (09 Jun 2019 11:40):    Growth in aerobic bottle: Gram Positive Cocci in Pairs and Chains    Growth in anaerobic bottle: Gram Positive Cocci in Pairs and Chains  Preliminary Report (09 Jun 2019 11:40):    Growth in aerobic bottle: Gram Positive Cocci in Pairs and Chains    Growth in anaerobic bottle: Gram Positive Cocci in Pairs and Chains    Culture - Blood (collected 07 Jun 2019 22:45)  Source: .Blood Blood  Gram Stain (08 Jun 2019 14:23):    Growth in aerobic and anaerobic bottles: Gram Positive Cocci in Pairs and    Chains  Preliminary Report (09 Jun 2019 12:00):    Growth in aerobic and anaerobic bottles: Enterococcus species    See previous culture [id number]    Culture - Blood (collected 07 Jun 2019 22:45)  Source: .Blood Blood  Gram Stain (08 Jun 2019 14:29):    Growth in aerobic bottle: Gram Positive Cocci in Pairs and Chains    Growth in anaerobic bottle: Gram Positive Cocci in Pairs and Chains  Preliminary Report (08 Jun 2019 14:29):    Growth in aerobic bottle: Gram Positive Cocci in Pairs and Chains    Growth in anaerobic bottle: Gram Positive Cocci in Pairs and Chains    "Due to technical problems, Proteus sp. will Not be reported as part of    the BCID panel until further notice"    ***Blood Panel PCR results on this specimen are available    approximately 3 hours after the Gram stain result.***    Gram stain, PCR, and/or culture results may not always    correspond due to difference in methodologies.    ************************************************************    This PCR assay was performed using [employer].    The following targets are tested for: Enterococcus,    vancomycin resistant enterococci, Listeria monocytogenes,    coagulase negative staphylococci, S. aureus,    methicillin resistant S. aureus, Streptococcus agalactiae    (Group B), S. pneumoniae, S. pyogenes (Group A),    Acinetobacter baumannii, Enterobacter cloacae, E. coli,    Klebsiella oxytoca, K. pneumoniae, Proteus sp.,    Serratia marcescens, Haemophilus influenzae,    Neisseria meningitidis, Pseudomonas aeruginosa, Candida    albicans, C. glabrata, C krusei, C parapsilosis,    C. tropicalis and the KPC resistance gene.  Organism: Blood Culture PCR (08 Jun 2019 15:31)  Organism: Blood Culture PCR (08 Jun 2019 15:31)    RADIOLOGY:    < from: Xray Chest 1 View-PORTABLE IMMEDIATE (06.07.19 @ 23:45) >  1. Linear subsegmental atelectasis/scarring at the left base.  2. Otherwise, no radiographic evidence of acute cardiopulmonary disease.    < end of copied text >    < from: Transthoracic Echocardiogram (06.08.19 @ 17:12) >   1. Normal global left ventricular systolic function.   2. LV Ejection Fractionby Keith's Method with a biplane EF of 64 %.   3. Elevated left atrial and left ventricular end-diastolic pressures.   4. Mild concentric left ventricular hypertrophy.   5. Mildly increased LV wall thickness.   6. Normal left ventricular internal cavity size.   7. Spectral Doppler shows pseudonormal pattern of left ventricular   myocardial filling (Grade II diastolic dysfunction).   8. 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   9. LA volume Index is 35.4 ml/m² ml/m2.    < end of copied text >      PHYSICAL EXAM:    GENERAL: Mildly lethargic, well-developed, AAOx3  HEENT:  Atraumatic, Normocephalic. Conjunctiva pink and cornea white, No JVD  PULMONARY: Clear to auscultation bilaterally; No wheeze  CARDIOVASCULAR: Regular rate and rhythm; Loud S1S2 with grade 2/6 systolic murmurs best heard at RUSB  GASTROINTESTINAL: Obese abdomen, soft, nontender, nondistended; Bowel sounds present  EXTREMITIES:  2+ Peripheral Pulses, No petal edema; no splinter hemorrhage, no tender nodules on palms  NEUROLOGY: non-focal  SKIN: No rashes or lesions      ADMISSION SUMMARY  Patient is a 68y old Male who presented with a chief complaint of fever, weakness and dyspnea on exertion  Currently admitted to medicine with the primary diagnosis of sepsis secondary to bacteremia, IE ruled out    ASSESSMENT & PLAN  68M with PMHx of HTN, Afib on Eliquis, SAVR (complicated by aortic aneurysm and infective endocarditis in 2011 and 2012) s/p PPM on daily doxycycline for ppx presents for weakness and fever which began the morning of presentation.      # Sepsis secondary to enterococcus bacteremia - resolved  - U/A negative, CXR negative, no skin or soft tissue infections; patient has been on daily doxy 100mg since 2012 suggested by Cardiologist (Dr. Foster)  - BCx 6/7 shows enterococcus (pending sensititivities); BCx 6/8 shows G+C in pairs and chains  - TTE 6/8 shows EF 64%, G1DD, no valvular vegetation  - MELVIN 6/10 shows EF 60-65% no vegetation, normal functioning bioprosthetic aortic valve, normal mitral and tricuspid valve, clear PM wires, 3 wires detected, no mass or thrombus in the chamber  - Abx switched to ampicillin 2g Q4hrs, gentamicin 100mg q8hrs, and vancomycin 1500mg q12hrs as per ID (Dr. Sanchez)  - Continue daily blood culture  - Cardio recs appreciated (Dr. Foster): will likely need 4 weeks of antibiotics, but follow up with ID    # Chronic AFib with PPM  - CHADsVASc 2 (Age, HTN)  - Currently at paced rhythm on EKG  - Continue BB and Eliquis  - EP consulted for PPM interrogation    # DLD  - Continue Lipitor 40mg and ASA    # HTN  - Continue metoprolol    # BPH  - will confirm med with wife today      DVT ppx: Eliquis  GI ppx: Not indicated  Diet: DASH  Activity: ambulate as tolerated  Lines: Peripheral IVs  Code status: Full code  Dispo: acute, pending ID recs for antibiotics RODRICK IQBAL 68y Male  MRN#: [id number]   CODE STATUS: Full code      SUBJECTIVE  Patient is a 68y old Male who presented with a chief complaint of fever, weakness and dyspnea on exertion  Currently admitted to medicine with the primary diagnosis of sepsis secondary to bacteremia, IE ruled out  Today is hospital day 3d, and this morning he is resting in bed and reports no overnight events. Patient states that he sleep fairly well overnight but he needed to use the bathroom very frequently. Denies fever/chills, chest pain, shortness of breath, abdominal pain, urinary symptoms.       OBJECTIVE  PAST MEDICAL & SURGICAL HISTORY  Infective endocarditis  History of BPH  Dyslipidemia  Hypertension  H/O: osteoarthritis  Atrial fibrillation  S/P AVR (aortic valve replacement): complicated by aortic aneurysm and infective endocarditis    ALLERGIES:  No Known Allergies      HOME MEDICATIONS:  HOME MEDICATIONS:  aspirin 81 mg oral tablet: 1 tab(s) orally once a day (08 Jun 2019 02:42)  Crestor 10 mg oral tablet: 1 tab(s) orally once a day (08 Jun 2019 02:41)  doxycycline hyclate 100 mg oral tablet: 1 tab(s) orally once a day (08 Jun 2019 02:42)  Eliquis 5 mg oral tablet: 1 tab(s) orally 2 times a day (08 Jun 2019 02:41)  fenofibrate 134 mg oral capsule: 1 cap(s) orally once a day (08 Jun 2019 02:41)  metoprolol succinate 25 mg oral tablet, extended release: 1 tab(s) orally 2 times a day (08 Jun 2019 02:41)  Uroxatral 10 mg oral tablet, extended release: 1 tab(s) orally once a day (08 Jun 2019 02:42)      MEDICATIONS:  STANDING MEDICATIONS  ampicillin  IVPB 2 Gram(s) IV Intermittent every 4 hours  ampicillin  IVPB      apixaban 5 milliGRAM(s) Oral every 12 hours  aspirin enteric coated 81 milliGRAM(s) Oral daily  atorvastatin 40 milliGRAM(s) Oral at bedtime  gentamicin   IVPB 100 milliGRAM(s) IV Intermittent every 8 hours  metoprolol tartrate 25 milliGRAM(s) Oral two times a day  simethicone 80 milliGRAM(s) Chew every 8 hours  vancomycin  IVPB 1500 milliGRAM(s) IV Intermittent every 12 hours    PRN MEDICATIONS  acetaminophen   Tablet .. 650 milliGRAM(s) Oral every 6 hours PRN  ibuprofen  Tablet. 400 milliGRAM(s) Oral once PRN      VITAL SIGNS: Last 24 Hours  T(C): 35.9 (10 Ward 2019 08:16), Max: 36.8 (09 Jun 2019 22:13)  T(F): 96.6 (10 Ward 2019 05:54), Max: 98.2 (09 Jun 2019 22:13)  HR: 69 (10 Ward 2019 08:16) (69 - 73)  BP: 109/55 (10 Ward 2019 08:16) (109/55 - 117/63)  BP(mean): --  RR: 20 (10 Ward 2019 08:16) (18 - 20)  SpO2: 95% (10 Ward 2019 08:16) (95% - 95%)    LABS:                        13.6   7.84  )-----------( 122      ( 10 Ward 2019 06:19 )             40.4     06-10    142  |  108  |  9<L>  ----------------------------<  92  4.4   |  23  |  0.8    Ca    8.4<L>      10 Ward 2019 06:19  Mg     2.0     06-10      Culture - Blood (collected 08 Jun 2019 06:05)  Source: .Blood None  Gram Stain (09 Jun 2019 11:40):    Growth in aerobic bottle: Gram Positive Cocci in Pairs and Chains    Growth in anaerobic bottle: Gram Positive Cocci in Pairs and Chains  Preliminary Report (09 Jun 2019 11:40):    Growth in aerobic bottle: Gram Positive Cocci in Pairs and Chains    Growth in anaerobic bottle: Gram Positive Cocci in Pairs and Chains    Culture - Blood (collected 07 Jun 2019 22:45)  Source: .Blood Blood  Gram Stain (08 Jun 2019 14:23):    Growth in aerobic and anaerobic bottles: Gram Positive Cocci in Pairs and    Chains  Preliminary Report (09 Jun 2019 12:00):    Growth in aerobic and anaerobic bottles: Enterococcus species    See previous culture [id number]    Culture - Blood (collected 07 Jun 2019 22:45)  Source: .Blood Blood  Gram Stain (08 Jun 2019 14:29):    Growth in aerobic bottle: Gram Positive Cocci in Pairs and Chains    Growth in anaerobic bottle: Gram Positive Cocci in Pairs and Chains  Preliminary Report (08 Jun 2019 14:29):    Growth in aerobic bottle: Gram Positive Cocci in Pairs and Chains    Growth in anaerobic bottle: Gram Positive Cocci in Pairs and Chains    "Due to technical problems, Proteus sp. will Not be reported as part of    the BCID panel until further notice"    ***Blood Panel PCR results on this specimen are available    approximately 3 hours after the Gram stain result.***    Gram stain, PCR, and/or culture results may not always    correspond due to difference in methodologies.    ************************************************************    This PCR assay was performed using [employer].    The following targets are tested for: Enterococcus,    vancomycin resistant enterococci, Listeria monocytogenes,    coagulase negative staphylococci, S. aureus,    methicillin resistant S. aureus, Streptococcus agalactiae    (Group B), S. pneumoniae, S. pyogenes (Group A),    Acinetobacter baumannii, Enterobacter cloacae, E. coli,    Klebsiella oxytoca, K. pneumoniae, Proteus sp.,    Serratia marcescens, Haemophilus influenzae,    Neisseria meningitidis, Pseudomonas aeruginosa, Candida    albicans, C. glabrata, C krusei, C parapsilosis,    C. tropicalis and the KPC resistance gene.  Organism: Blood Culture PCR (08 Jun 2019 15:31)  Organism: Blood Culture PCR (08 Jun 2019 15:31)    RADIOLOGY:    < from: Xray Chest 1 View-PORTABLE IMMEDIATE (06.07.19 @ 23:45) >  1. Linear subsegmental atelectasis/scarring at the left base.  2. Otherwise, no radiographic evidence of acute cardiopulmonary disease.    < end of copied text >    < from: Transthoracic Echocardiogram (06.08.19 @ 17:12) >   1. Normal global left ventricular systolic function.   2. LV Ejection Fractionby Keith's Method with a biplane EF of 64 %.   3. Elevated left atrial and left ventricular end-diastolic pressures.   4. Mild concentric left ventricular hypertrophy.   5. Mildly increased LV wall thickness.   6. Normal left ventricular internal cavity size.   7. Spectral Doppler shows pseudonormal pattern of left ventricular   myocardial filling (Grade II diastolic dysfunction).   8. 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   9. LA volume Index is 35.4 ml/m² ml/m2.    < end of copied text >      PHYSICAL EXAM:    GENERAL: Mildly lethargic, well-developed, AAOx3  HEENT:  Atraumatic, Normocephalic. Conjunctiva pink and cornea white, No JVD  PULMONARY: Clear to auscultation bilaterally; No wheeze  CARDIOVASCULAR: Regular rate and rhythm; Loud S1S2 with grade 2/6 systolic murmurs best heard at RUSB  GASTROINTESTINAL: Obese abdomen, soft, nontender, nondistended; Bowel sounds present  EXTREMITIES:  2+ Peripheral Pulses, No petal edema; no splinter hemorrhage, no tender nodules on palms  NEUROLOGY: non-focal  SKIN: No rashes or lesions      ADMISSION SUMMARY  Patient is a 68y old Male who presented with a chief complaint of fever, weakness and dyspnea on exertion  Currently admitted to medicine with the primary diagnosis of sepsis secondary to bacteremia, IE ruled out    ASSESSMENT & PLAN  68M with PMHx of HTN, Afib on Eliquis, SAVR (complicated by aortic aneurysm and infective endocarditis in 2011 and 2012) s/p PPM on daily doxycycline for ppx presents for weakness and fever which began the morning of presentation.      # Sepsis secondary to enterococcus bacteremia - resolved  - U/A negative, CXR negative, no skin or soft tissue infections; patient has been on daily doxy 100mg since 2012 suggested by Cardiologist (Dr. Foster)  - BCx 6/7 shows enterococcus (pending sensititivities); BCx 6/8 shows G+C in pairs and chains  - TTE 6/8 shows EF 64%, G1DD, no valvular vegetation  - MELVIN 6/10 shows EF 60-65% no vegetation, normal functioning bioprosthetic aortic valve, normal mitral and tricuspid valve, clear PM wires, 3 wires detected, no mass or thrombus in the chamber  - Abx switched to ampicillin 2g Q4hrs, gentamicin 100mg q8hrs, and vancomycin 1500mg q12hrs as per ID (Dr. Sanchez)  - Continue daily blood culture  - Cardio recs appreciated (Dr. Foster): will likely need 4 weeks of antibiotics, but follow up with ID    # Chronic AFib with PPM  - CHADsVASc 2 (Age, HTN)  - Currently at paced rhythm on EKG  - Continue BB and Eliquis  - EP consulted for PPM interrogation    # DLD  - Continue Lipitor 40mg and ASA    # HTN  - Continue metoprolol    # BPH  - Takes alfuzosin 10mg at home, will give Flomax 0.8mg QHS       DVT ppx: Eliquis  GI ppx: Not indicated  Diet: DASH  Activity: ambulate as tolerated  Lines: Peripheral IVs  Code status: Full code  Dispo: acute, pending ID recs for antibiotics

## 2019-06-10 NOTE — CHART NOTE - NSCHARTNOTEFT_GEN_A_CORE
POST OPERATIVE PROCEDURAL DOCUMENTATION  PRE-OP DIAGNOSIS: bacteremia, r/o endocarditis    POST-OP DIAGNOSIS: no vegetation, normal EF    PROCEDURE: Transesophageal echocardiogram    Primary Physician: Saranya Foster MD  Fellow:    ANESTHESIA TYPE  [  ] General Anesthesia  [ x ] Conscious Sedation  [  ] Local/Regional    CONDITION  [  ] Critical  [  ] Serious  [ x ] Fair  [  ] Good    SPECIMENS REMOVED (IF APPLICABLE): NA    IMPLANTS (IF APPLICABLE): None    ESTIMATED BLOOD LOSS: None    COMPLICATIONS: None    FINDINGS  DIAGNOSIS/IMPRESSION:  no vegetation  normal functioning bioprosthetic aortic valve, normal mitral and tricuspid valve  clear PM wires, 3 wires detected  clear CIRILO, no mass or thrombus in the chambers  normal EF 60-65%  no vegetation or abnormality in ascending aorta, normal ascending aortic prosthesis  moderate MR, no AI, trace TR    PLAN OF CARE:  antibiotic therapy, due to multiple prosthesis 4 weeks antibiotic therapy is recommended, but definite plan as per the ID

## 2019-06-10 NOTE — PROGRESS NOTE ADULT - ASSESSMENT
ASSESSMENT  68y old  Male with SAVR (complicated by aortic aneurysm and infective endocarditis in 2011 and 2012, unknown organism) s/p PPM on daily doxycycline as prophylaxis,  presents to the ER for evaluation of  weakness, SOB  and fever. Patient states he developed sudden onset weakness of lower extremities and lethargy.      PROBLEMS  # Enterococcus Bacteremia,  Prosthetic AV   +BCX 6/7, 6/8  MELVIN & TTE negative for vegetation  #Denies GI,  symptoms    R/o occult source, will likely treat with empiric course IV at least 4 weeks     RECOMMENDATIONS  - Gallium scan, r/o occult source  - ESR/CRP  - ADD ceftriaxone 2g q12h IV  - D/C gent  - Ampicillin 2 g q 4hours   - Continue Vanc, D/C if S ampicillin  - BCX until clears

## 2019-06-11 LAB
ANION GAP SERPL CALC-SCNC: 11 MMOL/L — SIGNIFICANT CHANGE UP (ref 7–14)
APTT BLD: 38.3 SEC — SIGNIFICANT CHANGE UP (ref 27–39.2)
BUN SERPL-MCNC: 10 MG/DL — SIGNIFICANT CHANGE UP (ref 10–20)
CALCIUM SERPL-MCNC: 8.2 MG/DL — LOW (ref 8.5–10.1)
CHLORIDE SERPL-SCNC: 106 MMOL/L — SIGNIFICANT CHANGE UP (ref 98–110)
CO2 SERPL-SCNC: 23 MMOL/L — SIGNIFICANT CHANGE UP (ref 17–32)
CREAT SERPL-MCNC: 0.9 MG/DL — SIGNIFICANT CHANGE UP (ref 0.7–1.5)
CRP SERPL-MCNC: 7.17 MG/DL — HIGH (ref 0–0.4)
GLUCOSE SERPL-MCNC: 155 MG/DL — HIGH (ref 70–99)
HCT VFR BLD CALC: 37.1 % — LOW (ref 42–52)
HGB BLD-MCNC: 12.7 G/DL — LOW (ref 14–18)
INR BLD: 1.48 RATIO — HIGH (ref 0.65–1.3)
MAGNESIUM SERPL-MCNC: 2 MG/DL — SIGNIFICANT CHANGE UP (ref 1.8–2.4)
MCHC RBC-ENTMCNC: 31.3 PG — HIGH (ref 27–31)
MCHC RBC-ENTMCNC: 34.2 G/DL — SIGNIFICANT CHANGE UP (ref 32–37)
MCV RBC AUTO: 91.4 FL — SIGNIFICANT CHANGE UP (ref 80–94)
NRBC # BLD: 0 /100 WBCS — SIGNIFICANT CHANGE UP (ref 0–0)
PLATELET # BLD AUTO: 145 K/UL — SIGNIFICANT CHANGE UP (ref 130–400)
POTASSIUM SERPL-MCNC: 3.9 MMOL/L — SIGNIFICANT CHANGE UP (ref 3.5–5)
POTASSIUM SERPL-SCNC: 3.9 MMOL/L — SIGNIFICANT CHANGE UP (ref 3.5–5)
PROTHROM AB SERPL-ACNC: 16.9 SEC — HIGH (ref 9.95–12.87)
RBC # BLD: 4.06 M/UL — LOW (ref 4.7–6.1)
RBC # FLD: 12.9 % — SIGNIFICANT CHANGE UP (ref 11.5–14.5)
SODIUM SERPL-SCNC: 140 MMOL/L — SIGNIFICANT CHANGE UP (ref 135–146)
WBC # BLD: 6.79 K/UL — SIGNIFICANT CHANGE UP (ref 4.8–10.8)
WBC # FLD AUTO: 6.79 K/UL — SIGNIFICANT CHANGE UP (ref 4.8–10.8)

## 2019-06-11 PROCEDURE — 78806: CPT | Mod: 26

## 2019-06-11 RX ADMIN — TAMSULOSIN HYDROCHLORIDE 0.8 MILLIGRAM(S): 0.4 CAPSULE ORAL at 21:12

## 2019-06-11 RX ADMIN — Medication 400 MILLIGRAM(S): at 20:25

## 2019-06-11 RX ADMIN — Medication 216 GRAM(S): at 01:10

## 2019-06-11 RX ADMIN — SIMETHICONE 80 MILLIGRAM(S): 80 TABLET, CHEWABLE ORAL at 05:32

## 2019-06-11 RX ADMIN — APIXABAN 5 MILLIGRAM(S): 2.5 TABLET, FILM COATED ORAL at 17:23

## 2019-06-11 RX ADMIN — Medication 216 GRAM(S): at 17:23

## 2019-06-11 RX ADMIN — Medication 25 MILLIGRAM(S): at 05:32

## 2019-06-11 RX ADMIN — Medication 216 GRAM(S): at 05:32

## 2019-06-11 RX ADMIN — CEFTRIAXONE 100 GRAM(S): 500 INJECTION, POWDER, FOR SOLUTION INTRAMUSCULAR; INTRAVENOUS at 17:23

## 2019-06-11 RX ADMIN — APIXABAN 5 MILLIGRAM(S): 2.5 TABLET, FILM COATED ORAL at 05:32

## 2019-06-11 RX ADMIN — Medication 300 MILLIGRAM(S): at 06:02

## 2019-06-11 RX ADMIN — SIMETHICONE 80 MILLIGRAM(S): 80 TABLET, CHEWABLE ORAL at 21:12

## 2019-06-11 RX ADMIN — Medication 25 MILLIGRAM(S): at 17:23

## 2019-06-11 RX ADMIN — Medication 216 GRAM(S): at 13:18

## 2019-06-11 RX ADMIN — Medication 216 GRAM(S): at 21:38

## 2019-06-11 RX ADMIN — Medication 216 GRAM(S): at 09:25

## 2019-06-11 RX ADMIN — Medication 81 MILLIGRAM(S): at 11:02

## 2019-06-11 RX ADMIN — ATORVASTATIN CALCIUM 40 MILLIGRAM(S): 80 TABLET, FILM COATED ORAL at 21:12

## 2019-06-11 RX ADMIN — SIMETHICONE 80 MILLIGRAM(S): 80 TABLET, CHEWABLE ORAL at 13:19

## 2019-06-11 RX ADMIN — Medication 400 MILLIGRAM(S): at 01:10

## 2019-06-11 RX ADMIN — Medication 400 MILLIGRAM(S): at 04:56

## 2019-06-11 RX ADMIN — Medication 400 MILLIGRAM(S): at 21:00

## 2019-06-11 RX ADMIN — CEFTRIAXONE 100 GRAM(S): 500 INJECTION, POWDER, FOR SOLUTION INTRAMUSCULAR; INTRAVENOUS at 05:00

## 2019-06-11 NOTE — PROGRESS NOTE ADULT - ASSESSMENT
RODRICK IQBAL 68y Male  MRN#: [id number]   CODE STATUS: Full code    SUBJECTIVE  Patient is a 68y old Male who presented with a chief complaint of fever, weakness and dyspnea on exertion  Currently admitted to medicine with the primary diagnosis of sepsis secondary to bacteremia, IE ruled out  Today is hospital day 4d, and this morning he is resting in bed and reports no overnight events. Patient states that he sleep fairly well overnight. Denies fever/chills, chest pain, shortness of breath, abdominal pain, urinary symptoms.       OBJECTIVE  PAST MEDICAL & SURGICAL HISTORY  Infective endocarditis  History of BPH  Dyslipidemia  Hypertension  H/O: osteoarthritis  Atrial fibrillation  S/P AVR (aortic valve replacement): complicated by aortic aneurysm and infective endocarditis    ALLERGIES:  No Known Allergies      HOME MEDICATIONS:  HOME MEDICATIONS:  aspirin 81 mg oral tablet: 1 tab(s) orally once a day (08 Jun 2019 02:42)  Crestor 10 mg oral tablet: 1 tab(s) orally once a day (08 Jun 2019 02:41)  doxycycline hyclate 100 mg oral tablet: 1 tab(s) orally once a day (08 Jun 2019 02:42)  Eliquis 5 mg oral tablet: 1 tab(s) orally 2 times a day (08 Jun 2019 02:41)  fenofibrate 134 mg oral capsule: 1 cap(s) orally once a day (08 Jun 2019 02:41)  metoprolol succinate 25 mg oral tablet, extended release: 1 tab(s) orally 2 times a day (08 Jun 2019 02:41)  Uroxatral 10 mg oral tablet, extended release: 1 tab(s) orally once a day (08 Jun 2019 02:42)      MEDICATIONS:  STANDING MEDICATIONS  ampicillin  IVPB      ampicillin  IVPB 2 Gram(s) IV Intermittent every 4 hours  apixaban 5 milliGRAM(s) Oral every 12 hours  aspirin enteric coated 81 milliGRAM(s) Oral daily  atorvastatin 40 milliGRAM(s) Oral at bedtime  cefTRIAXone   IVPB 2 Gram(s) IV Intermittent every 12 hours  metoprolol tartrate 25 milliGRAM(s) Oral two times a day  simethicone 80 milliGRAM(s) Chew every 8 hours  tamsulosin 0.8 milliGRAM(s) Oral at bedtime    PRN MEDICATIONS  acetaminophen   Tablet .. 650 milliGRAM(s) Oral every 6 hours PRN  ibuprofen  Tablet. 400 milliGRAM(s) Oral every 6 hours PRN      VITAL SIGNS: Last 24 Hours  T(C): 36.6 (11 Jun 2019 05:22), Max: 36.6 (10 Ward 2019 21:44)  T(F): 97.9 (11 Jun 2019 05:22), Max: 97.9 (10 Ward 2019 21:44)  HR: 70 (11 Jun 2019 05:22) (68 - 70)  BP: 121/59 (11 Jun 2019 05:22) (121/59 - 157/87)  BP(mean): --  RR: 18 (11 Jun 2019 05:22) (18 - 20)  SpO2: 98% (10 Ward 2019 17:00) (98% - 98%)    LABS:                        12.7   6.79  )-----------( 145      ( 11 Jun 2019 07:56 )             37.1     06-11    140  |  106  |  10  ----------------------------<  155<H>  3.9   |  23  |  0.9    Ca    8.2<L>      11 Jun 2019 07:56  Mg     2.0     06-11      PT/INR - ( 11 Jun 2019 07:56 )   PT: 16.90 sec;   INR: 1.48 ratio         PTT - ( 11 Jun 2019 07:56 )  PTT:38.3 sec          Culture - Blood (collected 10 Ward 2019 06:19)  Source: .Blood None  Preliminary Report (11 Jun 2019 13:01):    No growth to date.    Culture - Blood (collected 10 Ward 2019 06:19)  Source: .Blood None  Preliminary Report (11 Jun 2019 13:01):    No growth to date.    Culture - Blood (collected 09 Jun 2019 07:57)  Source: .Blood None  Preliminary Report (10 Ward 2019 17:01):    No growth to date.    Culture - Blood (collected 09 Jun 2019 07:57)  Source: .Blood None  Preliminary Report (10 Ward 2019 17:01):    No growth to date.    Culture - Blood (collected 09 Jun 2019 07:57)  Source: .Blood None  Preliminary Report (10 Ward 2019 17:00):    No growth to date.    RADIOLOGY:    < from: Xray Chest 1 View-PORTABLE IMMEDIATE (06.07.19 @ 23:45) >  1. Linear subsegmental atelectasis/scarring at the left base.  2. Otherwise, no radiographic evidence of acute cardiopulmonary disease.    < end of copied text >    < from: Transthoracic Echocardiogram (06.08.19 @ 17:12) >   1. Normal global left ventricular systolic function.   2. LV Ejection Fractionby Keith's Method with a biplane EF of 64 %.   3. Elevated left atrial and left ventricular end-diastolic pressures.   4. Mild concentric left ventricular hypertrophy.   5. Mildly increased LV wall thickness.   6. Normal left ventricular internal cavity size.   7. Spectral Doppler shows pseudonormal pattern of left ventricular   myocardial filling (Grade II diastolic dysfunction).   8. 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   9. LA volume Index is 35.4 ml/m² ml/m2.    < end of copied text >      PHYSICAL EXAM:    GENERAL: Mildly lethargic, well-developed, AAOx3  HEENT:  Atraumatic, Normocephalic. Conjunctiva pink and cornea white, No JVD  PULMONARY: Clear to auscultation bilaterally; No wheeze  CARDIOVASCULAR: Regular rate and rhythm; Loud S1S2 with grade 2/6 systolic murmurs best heard at RUSB  GASTROINTESTINAL: Obese abdomen, soft, nontender, nondistended; Bowel sounds present  EXTREMITIES:  2+ Peripheral Pulses, No petal edema; no splinter hemorrhage, no tender nodules on palms  NEUROLOGY: non-focal  SKIN: No rashes or lesions      ADMISSION SUMMARY  Patient is a 68y old Male who presented with a chief complaint of fever, weakness and dyspnea on exertion  Currently admitted to medicine with the primary diagnosis of sepsis secondary to bacteremia, IE ruled out    ASSESSMENT & PLAN  68M with PMHx of HTN, Afib on Eliquis, SAVR (complicated by aortic aneurysm and infective endocarditis in 2011 and 2012) s/p PPM on daily doxycycline for ppx presents for weakness and fever which began the morning of presentation.      # Sepsis secondary to enterococcus bacteremia - resolved  - U/A negative, CXR negative, no skin or soft tissue infections; patient has been on daily doxy 100mg since 2012 suggested by Cardiologist (Dr. Foster)  - BCx 6/7 shows enterococcus (sensitive to Amp, Vanco, and gent); BCx 6/8 shows G+C in pairs and chains, BCx on 6/9 and 6/10 NGTD  - TTE 6/8 shows EF 64%, G1DD, no valvular vegetation  - MELVIN 6/10 shows EF 60-65% no vegetation, normal functioning bioprosthetic aortic valve, normal mitral and tricuspid valve, clear PM wires, 3 wires detected, no mass or thrombus in the chamber  - D/C Vancomycin as Enterococcus is Amp sensitive, continue ampicillin 2g Q6hrs and Ceftriaxone 2g Q12hrs  - Cleared for PICC line placement by ID, will need 4 - 6 weeks of IV abx, can hold doxy for now while on IV abx  - Follow Gallium scan  - Cardio recs appreciated (Dr. Foster): will likely need 4 weeks of antibiotics, but follow up with ID  - Need weekly CBC, CMP and weekly follow up with ID outpatient    # Chronic AFib with PPM  - CHADsVASc 2 (Age, HTN)  - Currently at paced rhythm on EKG  - Continue BB and Eliquis  - PPM interrogation result pending    # DLD  - Continue Lipitor 40mg and ASA    # HTN  - Continue metoprolol    # BPH  - Takes alfuzosin 10mg at home, will give Flomax 0.8mg QHS       DVT ppx: Eliquis  GI ppx: Not indicated  Diet: DASH  Activity: ambulate as tolerated  Lines: Peripheral IVs  Code status: Full code  Dispo: acute, pending PICC line placement

## 2019-06-11 NOTE — PROGRESS NOTE ADULT - ASSESSMENT
enteroccal bacteremia/ sepsis  prothetic av valve  chronic a fib  dyslip  htn  bph    eps  id eval   iv abx  gallium scan

## 2019-06-11 NOTE — PROGRESS NOTE ADULT - ASSESSMENT
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 Patient states he developed sudden onset weakness of lower extremities and lethargy.      PROBLEMS  # Enterococcus faecalis Bacteremia (S amp, Vanc),  Prosthetic AV     +BCX 6/7- 6/8, BCx NG 6/9    MELVIN & TTE negative for vegetation  # Denies GI,  symptoms    R/o occult source, will likely treat with empiric course IV at least 4 weeks     RECOMMENDATIONS  - Gallium scan, r/o occult source pending  - D/C Vanc as e. faecalis is amp S  - Ceftriaxone 2g q12h IV  - Ampicillin 2g q 4h IV  - Plan for 4-6 week IV course  - Hold Doxy ppx while on above abx  - Cleared for PICC if BCX remain NG today   - Weekly CBC, BMP  - ID follow-up - will make an appt prior to D/C      [address]       [phone]       Fax [phone]

## 2019-06-12 LAB
ANION GAP SERPL CALC-SCNC: 12 MMOL/L — SIGNIFICANT CHANGE UP (ref 7–14)
BUN SERPL-MCNC: 10 MG/DL — SIGNIFICANT CHANGE UP (ref 10–20)
CALCIUM SERPL-MCNC: 8.5 MG/DL — SIGNIFICANT CHANGE UP (ref 8.5–10.1)
CHLORIDE SERPL-SCNC: 108 MMOL/L — SIGNIFICANT CHANGE UP (ref 98–110)
CO2 SERPL-SCNC: 23 MMOL/L — SIGNIFICANT CHANGE UP (ref 17–32)
CREAT SERPL-MCNC: 0.8 MG/DL — SIGNIFICANT CHANGE UP (ref 0.7–1.5)
GLUCOSE SERPL-MCNC: 116 MG/DL — HIGH (ref 70–99)
HCT VFR BLD CALC: 38.2 % — LOW (ref 42–52)
HGB BLD-MCNC: 12.9 G/DL — LOW (ref 14–18)
MAGNESIUM SERPL-MCNC: 1.9 MG/DL — SIGNIFICANT CHANGE UP (ref 1.8–2.4)
MCHC RBC-ENTMCNC: 31 PG — SIGNIFICANT CHANGE UP (ref 27–31)
MCHC RBC-ENTMCNC: 33.8 G/DL — SIGNIFICANT CHANGE UP (ref 32–37)
MCV RBC AUTO: 91.8 FL — SIGNIFICANT CHANGE UP (ref 80–94)
NRBC # BLD: 0 /100 WBCS — SIGNIFICANT CHANGE UP (ref 0–0)
PLATELET # BLD AUTO: 154 K/UL — SIGNIFICANT CHANGE UP (ref 130–400)
POTASSIUM SERPL-MCNC: 4 MMOL/L — SIGNIFICANT CHANGE UP (ref 3.5–5)
POTASSIUM SERPL-SCNC: 4 MMOL/L — SIGNIFICANT CHANGE UP (ref 3.5–5)
RBC # BLD: 4.16 M/UL — LOW (ref 4.7–6.1)
RBC # FLD: 13 % — SIGNIFICANT CHANGE UP (ref 11.5–14.5)
SODIUM SERPL-SCNC: 143 MMOL/L — SIGNIFICANT CHANGE UP (ref 135–146)
WBC # BLD: 7.23 K/UL — SIGNIFICANT CHANGE UP (ref 4.8–10.8)
WBC # FLD AUTO: 7.23 K/UL — SIGNIFICANT CHANGE UP (ref 4.8–10.8)

## 2019-06-12 PROCEDURE — 78807: CPT | Mod: 26

## 2019-06-12 PROCEDURE — 71260 CT THORAX DX C+: CPT | Mod: 26

## 2019-06-12 RX ORDER — IPRATROPIUM/ALBUTEROL SULFATE 18-103MCG
3 AEROSOL WITH ADAPTER (GRAM) INHALATION EVERY 6 HOURS
Refills: 0 | Status: DISCONTINUED | OUTPATIENT
Start: 2019-06-12 | End: 2019-06-14

## 2019-06-12 RX ADMIN — Medication 25 MILLIGRAM(S): at 17:31

## 2019-06-12 RX ADMIN — SIMETHICONE 80 MILLIGRAM(S): 80 TABLET, CHEWABLE ORAL at 21:26

## 2019-06-12 RX ADMIN — Medication 216 GRAM(S): at 05:45

## 2019-06-12 RX ADMIN — Medication 216 GRAM(S): at 17:33

## 2019-06-12 RX ADMIN — TAMSULOSIN HYDROCHLORIDE 0.8 MILLIGRAM(S): 0.4 CAPSULE ORAL at 21:25

## 2019-06-12 RX ADMIN — SIMETHICONE 80 MILLIGRAM(S): 80 TABLET, CHEWABLE ORAL at 05:45

## 2019-06-12 RX ADMIN — APIXABAN 5 MILLIGRAM(S): 2.5 TABLET, FILM COATED ORAL at 17:31

## 2019-06-12 RX ADMIN — ATORVASTATIN CALCIUM 40 MILLIGRAM(S): 80 TABLET, FILM COATED ORAL at 21:25

## 2019-06-12 RX ADMIN — Medication 216 GRAM(S): at 14:42

## 2019-06-12 RX ADMIN — Medication 81 MILLIGRAM(S): at 10:57

## 2019-06-12 RX ADMIN — CEFTRIAXONE 100 GRAM(S): 500 INJECTION, POWDER, FOR SOLUTION INTRAMUSCULAR; INTRAVENOUS at 05:10

## 2019-06-12 RX ADMIN — Medication 400 MILLIGRAM(S): at 21:26

## 2019-06-12 RX ADMIN — Medication 25 MILLIGRAM(S): at 05:45

## 2019-06-12 RX ADMIN — APIXABAN 5 MILLIGRAM(S): 2.5 TABLET, FILM COATED ORAL at 05:45

## 2019-06-12 RX ADMIN — SIMETHICONE 80 MILLIGRAM(S): 80 TABLET, CHEWABLE ORAL at 13:08

## 2019-06-12 RX ADMIN — CEFTRIAXONE 100 GRAM(S): 500 INJECTION, POWDER, FOR SOLUTION INTRAMUSCULAR; INTRAVENOUS at 17:33

## 2019-06-12 RX ADMIN — Medication 216 GRAM(S): at 01:30

## 2019-06-12 RX ADMIN — Medication 216 GRAM(S): at 21:25

## 2019-06-12 RX ADMIN — Medication 216 GRAM(S): at 10:57

## 2019-06-12 NOTE — PROGRESS NOTE ADULT - ASSESSMENT
enteroccal bacteremia/ sepsis  prothetic av valve  chronic a fib  dyslip  htn  bph    eps  id eval   iv abx  gallium scanfu  picc line

## 2019-06-12 NOTE — PROCEDURE NOTE - NSPOSTPRCRAD_GEN_A_CORE
depth of insertion/fluoroscopy/line adjusted to depth of insertion/chest radiograph/line in appropriate postion

## 2019-06-12 NOTE — PROGRESS NOTE ADULT - ASSESSMENT
RODRICK IQBAL 68y Male  MRN#: [id number]   CODE STATUS: Full code      SUBJECTIVE  Patient is a 68y old Male who presented with a chief complaint of fever, weakness and dyspnea on exertion  Currently admitted to medicine with the primary diagnosis of sepsis secondary to bacteremia, IE ruled out  Today is hospital day 5d, and this morning he is resting in bed and reports no overnight events. Patient states that he sleep fairly well overnight. Denies fever/chills, chest pain, shortness of breath, abdominal pain, urinary symptoms. States that he is back to baseline.      OBJECTIVE  PAST MEDICAL & SURGICAL HISTORY  Infective endocarditis  History of BPH  Dyslipidemia  Hypertension  H/O: osteoarthritis  Atrial fibrillation  S/P AVR (aortic valve replacement): complicated by aortic aneurysm and infective endocarditis    ALLERGIES:  No Known Allergies      HOME MEDICATIONS:  HOME MEDICATIONS:  aspirin 81 mg oral tablet: 1 tab(s) orally once a day (08 Jun 2019 02:42)  Crestor 10 mg oral tablet: 1 tab(s) orally once a day (08 Jun 2019 02:41)  doxycycline hyclate 100 mg oral tablet: 1 tab(s) orally once a day (08 Jun 2019 02:42)  Eliquis 5 mg oral tablet: 1 tab(s) orally 2 times a day (08 Jun 2019 02:41)  fenofibrate 134 mg oral capsule: 1 cap(s) orally once a day (08 Jun 2019 02:41)  metoprolol succinate 25 mg oral tablet, extended release: 1 tab(s) orally 2 times a day (08 Jun 2019 02:41)  Uroxatral 10 mg oral tablet, extended release: 1 tab(s) orally once a day (08 Jun 2019 02:42)      MEDICATIONS:  STANDING MEDICATIONS  ampicillin  IVPB      ampicillin  IVPB 2 Gram(s) IV Intermittent every 4 hours  apixaban 5 milliGRAM(s) Oral every 12 hours  aspirin enteric coated 81 milliGRAM(s) Oral daily  atorvastatin 40 milliGRAM(s) Oral at bedtime  cefTRIAXone   IVPB 2 Gram(s) IV Intermittent every 12 hours  metoprolol tartrate 25 milliGRAM(s) Oral two times a day  simethicone 80 milliGRAM(s) Chew every 8 hours  tamsulosin 0.8 milliGRAM(s) Oral at bedtime    PRN MEDICATIONS  acetaminophen   Tablet .. 650 milliGRAM(s) Oral every 6 hours PRN  ibuprofen  Tablet. 400 milliGRAM(s) Oral every 6 hours PRN      VITAL SIGNS: Last 24 Hours  T(C): 36.7 (12 Jun 2019 05:36), Max: 36.8 (11 Jun 2019 21:08)  T(F): 98 (12 Jun 2019 05:36), Max: 98.2 (11 Jun 2019 21:08)  HR: 68 (12 Jun 2019 05:36) (68 - 70)  BP: 127/69 (12 Jun 2019 05:36) (127/69 - 150/72)  BP(mean): --  RR: 20 (12 Jun 2019 05:36) (20 - 20)  SpO2: 96% (12 Jun 2019 05:36) (95% - 96%)    LABS:                        12.9   7.23  )-----------( 154      ( 12 Jun 2019 07:00 )             38.2     06-12    143  |  108  |  10  ----------------------------<  116<H>  4.0   |  23  |  0.8    Ca    8.5      12 Jun 2019 07:00  Mg     1.9     06-12      PT/INR - ( 11 Jun 2019 07:56 )   PT: 16.90 sec;   INR: 1.48 ratio         PTT - ( 11 Jun 2019 07:56 )  PTT:38.3 sec          Culture - Blood (collected 10 Ward 2019 06:19)  Source: .Blood None  Preliminary Report (11 Jun 2019 13:01):    No growth to date.    Culture - Blood (collected 10 Ward 2019 06:19)  Source: .Blood None  Preliminary Report (11 Jun 2019 13:01):    No growth to date.    RADIOLOGY:    < from: Xray Chest 1 View-PORTABLE IMMEDIATE (06.07.19 @ 23:45) >  1. Linear subsegmental atelectasis/scarring at the left base.  2. Otherwise, no radiographic evidence of acute cardiopulmonary disease.    < end of copied text >    < from: Transthoracic Echocardiogram (06.08.19 @ 17:12) >   1. Normal global left ventricular systolic function.   2. LV Ejection Fractionby Keith's Method with a biplane EF of 64 %.   3. Elevated left atrial and left ventricular end-diastolic pressures.   4. Mild concentric left ventricular hypertrophy.   5. Mildly increased LV wall thickness.   6. Normal left ventricular internal cavity size.   7. Spectral Doppler shows pseudonormal pattern of left ventricular   myocardial filling (Grade II diastolic dysfunction).   8. 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   9. LA volume Index is 35.4 ml/m² ml/m2.    < end of copied text >      PHYSICAL EXAM:    GENERAL: Mildly lethargic, well-developed, AAOx3  HEENT:  Atraumatic, Normocephalic. Conjunctiva pink and cornea white, No JVD  PULMONARY: Clear to auscultation bilaterally; No wheeze  CARDIOVASCULAR: Regular rate and rhythm; Loud S1S2 with grade 2/6 systolic murmurs best heard at RUSB  GASTROINTESTINAL: Obese abdomen, soft, nontender, nondistended; Bowel sounds present  EXTREMITIES:  2+ Peripheral Pulses, No petal edema; no splinter hemorrhage, no tender nodules on palms  NEUROLOGY: non-focal  SKIN: No rashes or lesions      ADMISSION SUMMARY  Patient is a 68y old Male who presented with a chief complaint of fever, weakness and dyspnea on exertion  Currently admitted to medicine with the primary diagnosis of sepsis secondary to bacteremia, IE ruled out    ASSESSMENT & PLAN  68M with PMHx of HTN, Afib on Eliquis, SAVR (complicated by aortic aneurysm and infective endocarditis in 2011 and 2012) s/p PPM on daily doxycycline for ppx presents for weakness and fever which began the morning of presentation.      # Sepsis secondary to enterococcus bacteremia - resolved  - U/A negative, CXR negative, no skin or soft tissue infections; patient has been on daily doxy 100mg since 2012 suggested by Cardiologist (Dr. Foster)  - BCx 6/7 shows enterococcus (sensitive to Amp, Vanco, and gent); BCx 6/8 shows G+C in pairs and chains, BCx on 6/9 and 6/10 NGTD  - TTE 6/8 shows EF 64%, G1DD, no valvular vegetation  - MELVIN 6/10 shows EF 60-65% no vegetation, normal functioning bioprosthetic aortic valve, normal mitral and tricuspid valve, clear PM wires, 3 wires detected, no mass or thrombus in the chamber  - 24 hrs Gallium scan shows abnormal gallium uptake in the anterior midline, in region of left hepatic lobe dome/xiphoid process  - Continue ampicillin 2g Q4hrs and Ceftriaxone 2g Q12hrs  - ID recs appreciated (Dr. Avalos): Cleared for PICC line placement by ID, will need likely 6 weeks of IV abx, can hold doxy for now while on IV abx  - Follow 48-hour delayed Gallium scan  - Cardio recs appreciated (Dr. Foster): will likely need 4 weeks of antibiotics, but follow up with ID  - Need weekly CBC, CMP and weekly follow up with ID outpatient: 6/25/19 11:25am [address]    # Chronic AFib with PPM  - CHADsVASc 2 (Age, HTN)  - Currently at paced rhythm on EKG  - Continue BB and Eliquis  - PPM interrogation result pending    # DLD  - Continue Lipitor 40mg and ASA    # HTN  - Continue metoprolol    # BPH  - Takes alfuzosin 10mg at home, will give Flomax 0.8mg QHS       DVT ppx: Eliquis  GI ppx: Not indicated  Diet: DASH  Activity: ambulate as tolerated  Lines: Peripheral IVs  Code status: Full code  Dispo: likely d/c tomorrow after infusion pump is approved

## 2019-06-12 NOTE — PROCEDURE NOTE - NSPOSTCAREGUIDE_GEN_A_CORE
Instructed patient/caregiver to follow-up with primary care physician/Instructed patient/caregiver regarding signs and symptoms of infection/Care for catheter as per unit/ICU protocols/Verbal/written post procedure instructions were given to patient/caregiver/Keep the cast/splint/dressing clean and dry

## 2019-06-12 NOTE — CHART NOTE - NSCHARTNOTEFT_GEN_A_CORE
Called by Radiology attending Dr. [address]. Positive 48-hour gallium scan today and he recommended CT chest with IV contrast to rule out possible foci of infection/osteomyelitis due to history of enterococcus bacteremia. Urgent CT scan ordered. Result discussed with patient and his family at bedside. Will follow up result.

## 2019-06-12 NOTE — PROGRESS NOTE ADULT - ASSESSMENT
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 Patient states he developed sudden onset weakness of lower extremities and lethargy.      PROBLEMS  # Enterococcus faecalis Bacteremia (S amp, Vanc),  Prosthetic AV     +BCX 6/7- 6/8, BCx NG 6/9    MELVIN & TTE negative for vegetation    Gallium 3+ intensity abnormal gallium uptake in the anterior midline mid line, in   region of left hepatic lobe dome/xiphoid process , possibly secondary to   costochondritis.   # Denies GI,  symptoms    R/o occult source, will likely treat with empiric course IV at least 4 weeks     RECOMMENDATIONS  - F/u final Gallium scan, r/o occult source   - Ceftriaxone 2g q12h IV  - Ampicillin 2g q 4h IV  - Plan for likely 6 week IV course  - Hold Doxy ppx while on above abx  - Cleared for PICC   - Weekly CBC, BMP  - ID follow-up - appt 6/25/19 Tues 11:15 AM (please place in DC summary)       [address]       [phone]       Fax [phone]

## 2019-06-13 ENCOUNTER — TRANSCRIPTION ENCOUNTER (OUTPATIENT)
Age: 69
End: 2019-06-13

## 2019-06-13 LAB
ANION GAP SERPL CALC-SCNC: 14 MMOL/L — SIGNIFICANT CHANGE UP (ref 7–14)
BUN SERPL-MCNC: 11 MG/DL — SIGNIFICANT CHANGE UP (ref 10–20)
CALCIUM SERPL-MCNC: 8.5 MG/DL — SIGNIFICANT CHANGE UP (ref 8.5–10.1)
CHLORIDE SERPL-SCNC: 104 MMOL/L — SIGNIFICANT CHANGE UP (ref 98–110)
CO2 SERPL-SCNC: 22 MMOL/L — SIGNIFICANT CHANGE UP (ref 17–32)
CREAT SERPL-MCNC: 0.8 MG/DL — SIGNIFICANT CHANGE UP (ref 0.7–1.5)
GLUCOSE SERPL-MCNC: 106 MG/DL — HIGH (ref 70–99)
HCT VFR BLD CALC: 38.9 % — LOW (ref 42–52)
HGB BLD-MCNC: 13.1 G/DL — LOW (ref 14–18)
MCHC RBC-ENTMCNC: 30.7 PG — SIGNIFICANT CHANGE UP (ref 27–31)
MCHC RBC-ENTMCNC: 33.7 G/DL — SIGNIFICANT CHANGE UP (ref 32–37)
MCV RBC AUTO: 91.1 FL — SIGNIFICANT CHANGE UP (ref 80–94)
NRBC # BLD: 0 /100 WBCS — SIGNIFICANT CHANGE UP (ref 0–0)
PLATELET # BLD AUTO: 195 K/UL — SIGNIFICANT CHANGE UP (ref 130–400)
POTASSIUM SERPL-MCNC: 3.9 MMOL/L — SIGNIFICANT CHANGE UP (ref 3.5–5)
POTASSIUM SERPL-SCNC: 3.9 MMOL/L — SIGNIFICANT CHANGE UP (ref 3.5–5)
RBC # BLD: 4.27 M/UL — LOW (ref 4.7–6.1)
RBC # FLD: 12.9 % — SIGNIFICANT CHANGE UP (ref 11.5–14.5)
SODIUM SERPL-SCNC: 140 MMOL/L — SIGNIFICANT CHANGE UP (ref 135–146)
WBC # BLD: 7.27 K/UL — SIGNIFICANT CHANGE UP (ref 4.8–10.8)
WBC # FLD AUTO: 7.27 K/UL — SIGNIFICANT CHANGE UP (ref 4.8–10.8)

## 2019-06-13 PROCEDURE — 99222 1ST HOSP IP/OBS MODERATE 55: CPT

## 2019-06-13 RX ORDER — CEFTRIAXONE 500 MG/1
2 INJECTION, POWDER, FOR SOLUTION INTRAMUSCULAR; INTRAVENOUS ONCE
Refills: 0 | Status: COMPLETED | OUTPATIENT
Start: 2019-06-13 | End: 2019-06-13

## 2019-06-13 RX ORDER — CEFTRIAXONE 500 MG/1
2 INJECTION, POWDER, FOR SOLUTION INTRAMUSCULAR; INTRAVENOUS EVERY 12 HOURS
Refills: 0 | Status: DISCONTINUED | OUTPATIENT
Start: 2019-06-13 | End: 2019-06-14

## 2019-06-13 RX ORDER — CEFTRIAXONE 500 MG/1
2 INJECTION, POWDER, FOR SOLUTION INTRAMUSCULAR; INTRAVENOUS
Qty: 10 | Refills: 0
Start: 2019-06-13 | End: 2019-07-12

## 2019-06-13 RX ORDER — AMPICILLIN TRIHYDRATE 250 MG
2 CAPSULE ORAL
Qty: 0 | Refills: 0 | DISCHARGE

## 2019-06-13 RX ORDER — CEFTRIAXONE 500 MG/1
INJECTION, POWDER, FOR SOLUTION INTRAMUSCULAR; INTRAVENOUS
Refills: 0 | Status: DISCONTINUED | OUTPATIENT
Start: 2019-06-13 | End: 2019-06-14

## 2019-06-13 RX ADMIN — SIMETHICONE 80 MILLIGRAM(S): 80 TABLET, CHEWABLE ORAL at 21:09

## 2019-06-13 RX ADMIN — Medication 400 MILLIGRAM(S): at 11:07

## 2019-06-13 RX ADMIN — Medication 216 GRAM(S): at 14:13

## 2019-06-13 RX ADMIN — Medication 216 GRAM(S): at 01:04

## 2019-06-13 RX ADMIN — Medication 216 GRAM(S): at 17:58

## 2019-06-13 RX ADMIN — APIXABAN 5 MILLIGRAM(S): 2.5 TABLET, FILM COATED ORAL at 05:57

## 2019-06-13 RX ADMIN — Medication 25 MILLIGRAM(S): at 05:57

## 2019-06-13 RX ADMIN — Medication 216 GRAM(S): at 05:57

## 2019-06-13 RX ADMIN — Medication 81 MILLIGRAM(S): at 11:07

## 2019-06-13 RX ADMIN — ATORVASTATIN CALCIUM 40 MILLIGRAM(S): 80 TABLET, FILM COATED ORAL at 21:09

## 2019-06-13 RX ADMIN — APIXABAN 5 MILLIGRAM(S): 2.5 TABLET, FILM COATED ORAL at 17:58

## 2019-06-13 RX ADMIN — Medication 216 GRAM(S): at 11:07

## 2019-06-13 RX ADMIN — Medication 216 GRAM(S): at 21:09

## 2019-06-13 RX ADMIN — CEFTRIAXONE 100 GRAM(S): 500 INJECTION, POWDER, FOR SOLUTION INTRAMUSCULAR; INTRAVENOUS at 11:08

## 2019-06-13 RX ADMIN — TAMSULOSIN HYDROCHLORIDE 0.8 MILLIGRAM(S): 0.4 CAPSULE ORAL at 21:09

## 2019-06-13 RX ADMIN — SIMETHICONE 80 MILLIGRAM(S): 80 TABLET, CHEWABLE ORAL at 05:57

## 2019-06-13 RX ADMIN — Medication 400 MILLIGRAM(S): at 18:42

## 2019-06-13 RX ADMIN — Medication 25 MILLIGRAM(S): at 17:58

## 2019-06-13 RX ADMIN — CEFTRIAXONE 100 GRAM(S): 500 INJECTION, POWDER, FOR SOLUTION INTRAMUSCULAR; INTRAVENOUS at 17:59

## 2019-06-13 NOTE — CONSULT NOTE ADULT - ATTENDING COMMENTS
68M with PMHx of HTN, BPH, Afib on Eliquis, SAVR (1996), aortic aneurysm repair (2011), endocarditis, 2012), s/p PPM on daily prophylactic doxycycline since 2012, presents for weakness and fever which began the morning of presentation.   Patient was found to have bacteremia with enterococcus, treated with iv ceftriaxone and ampicillin. Last blood culture neg to date, pt is afebrile with normal wbc. Ct surgery called to evaluate after + gallium showed uptake behind xiphoid process and lower sternum. CT chest showed fluid attenuation posterior sternum and xiphoid process.     Images were reviewed with Dr. Reyes and he felt that CT findings are typical postsurgical changes that were present on scan in Nov 2018 and 2017. Gallium scan findings are being supported by CT imaging  d/w Dr. Avalos the plan for the patient.   cont with abx, cultures are negative  f/u with ID and Dr. Apple  pt is OK to be d/w abx IV from thoracic perspective.

## 2019-06-13 NOTE — DISCHARGE NOTE PROVIDER - HOSPITAL COURSE
68M with PMHx of HTN, BPH, Afib on Eliquis, SAVR (complicated by aortic aneurysm and infective endocarditis in 2011 and 2012) s/p PPM on daily doxycycline for ppx presents for weakness and fever which began the morning of presentation. Patient states he developed sudden onset weakness of lower extremities this AM and feelings of lethargy. Symptoms associated with increased shortness of breath on exertion and subjective fever. He has mild headache but denies any changes in vision or nuchal rigidity. Patient denies any cough, cp, palpitations, nausea, vomiting, diarrhea, constipation, dysuria, abdominal pain, recent travel, recent sick contact, skin tears. Of note patient had 3rd surgical AVR after developing infective endocarditis in 2012. At that time his symptoms were worse. He had severe headache, finger pain with splinter hemorrhage. He was placed on daily doxycycline by cardiology since 2012.     In ED: Vitals T 101.6, 125/65, HR 90, WBC 15K, received x1 Vancomycin, gentamycin, cefepime. Blood culture grew pansensitive enterococcus. ID was consulted and it is recommended to have IV antibiotics for a total of 6 weeks.         # Sepsis secondary to enterococcus bacteremia - resolved    - U/A negative, CXR negative, no skin or soft tissue infections; patient has been on daily doxy 100mg since 2012 suggested by Cardiologist (Dr. Foster)    - BCx 6/7 shows enterococcus (sensitive to Amp, Vanco, and gent); BCx 6/8 shows G+C in pairs and chains, BCx on 6/9 and 6/10 NGTD    - TTE 6/8 shows EF 64%, G1DD, no valvular vegetation    - MELVIN 6/10 shows EF 60-65% no vegetation, normal functioning bioprosthetic aortic valve, normal mitral and tricuspid valve, clear PM wires, 3 wires detected, no mass or thrombus in the chamber    - 24 hrs Gallium scan shows abnormal gallium uptake in the anterior midline, in region of left hepatic lobe dome/xiphoid process    - Continue ampicillin 2g Q4hrs and Ceftriaxone 2g Q12hrs    - ID recs appreciated (Dr. Avalos): Cleared for PICC line placement by ID, will need likely 6 weeks of IV abx, can hold doxy for now while on IV abx    - Follow 48-hour delayed Gallium scan    - Cardio recs appreciated (Dr. Ghavami): will likely need 4 weeks of antibiotics, but follow up with ID    - Need weekly CBC, CMP and weekly follow up with ID outpatient: 6/25/19 11:25am [address]        # Chronic AFib with PPM    - CHADsVASc 2 (Age, HTN)    - Currently at paced rhythm on EKG    - Continue BB and Eliquis    - PPM interrogation without event        # DLD    - Continue Lipitor 40mg and ASA        # HTN    - Continue metoprolol        # BPH    - Takes alfuzosin 10mg at home, will give Flomax 0.8mg QHS 68M with PMHx of HTN, BPH, Afib on Eliquis, SAVR (complicated by aortic aneurysm and infective endocarditis in 2011 and 2012) s/p PPM on daily doxycycline for ppx presents for weakness and fever which began the morning of presentation. Patient states he developed sudden onset weakness of lower extremities this AM and feelings of lethargy. Symptoms associated with increased shortness of breath on exertion and subjective fever. He has mild headache but denies any changes in vision or nuchal rigidity. Patient denies any cough, cp, palpitations, nausea, vomiting, diarrhea, constipation, dysuria, abdominal pain, recent travel, recent sick contact, skin tears. Of note patient had 3rd surgical AVR after developing infective endocarditis in 2012. At that time his symptoms were worse. He had severe headache, finger pain with splinter hemorrhage. He was placed on daily doxycycline by cardiology since 2012.     In ED: Vitals T 101.6, 125/65, HR 90, WBC 15K, received x1 Vancomycin, gentamycin, cefepime. Blood culture grew pansensitive enterococcus. ID was consulted and it is recommended to have IV antibiotics for a total of 6 weeks.     # Sepsis secondary to enterococcus bacteremia - resolved    - U/A negative, CXR negative, no skin or soft tissue infections; patient has been on daily doxy 100mg since 2012 suggested by Cardiologist (Dr. Foster)    - BCx 6/7 shows enterococcus (sensitive to Amp, Vanco, and gent); BCx 6/8 shows G+C in pairs and chains, BCx on 6/9 and 6/10 NGTD    - TTE 6/8 shows EF 64%, G1DD, no valvular vegetation    - MELVIN 6/10 shows EF 60-65% no vegetation, normal functioning bioprosthetic aortic valve, normal mitral and tricuspid valve, clear PM wires, 3 wires detected, no mass or thrombus in the chamber    - 48 hrs Gallium scan shows persistent abnormal gallium uptake in the anterior midline, in region of left hepatic lobe dome/xiphoid process, CT chest with IV contrast shows fluid attenuation behind sternum, consistent with gallium uptake    - Continue ampicillin 2g Q4hrs and Ceftriaxone 2g Q12hrs    - ID recs appreciated (Dr. Avalos): CTS consult for possible drainage of fluid. Will need likely 6 weeks of IV abx, can hold doxy for now while on IV abx    - Cardio recs appreciated (Dr. Foster): will likely need 4 weeks of antibiotics, but follow up with ID    - Need weekly CBC, CMP and weekly follow up with ID outpatient: 6/25/19 11:25am [address]    - Case discussed with Brian Donnelly and Bailey, no urgent intervention is needed, can be discharged home tomorrow to complete course of antibiotics. Patient will need to follow up with his own CT surgeon at [facility].         # Chronic AFib with PPM    - CHADsVASc 2 (Age, HTN)    - Currently at paced rhythm on EKG    - Continue BB and Eliquis    - PPM interrogation no events        # DLD    - Continue Lipitor 40mg and ASA        # HTN    - Continue metoprolol        # BPH    - Takes alfuzosin 10mg at home, will give Flomax 0.8mg QHS

## 2019-06-13 NOTE — PROGRESS NOTE ADULT - ASSESSMENT
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 Patient states he developed sudden onset weakness of lower extremities and lethargy.      PROBLEMS  # Enterococcus faecalis Bacteremia (S amp, Vanc),  Prosthetic AV     +BCX 6/7- 6/8, BCx NG 6/9    MELVIN & TTE negative for vegetation    Gallium 3+ intensity abnormal gallium uptake in the anterior midline mid line, in region of left hepatic lobe dome/xiphoid process , possibly secondary to   costochondritis.     CT Chest showing Fluid attenuation extends from posterior sternum to pericardium and abuts ascending aorta, corresponds to the finding of previous positive gallium scan.  # Denies GI,  symptoms    RECOMMENDATIONS  - CTS consult re: fluid posterior to sternum, need for drainage?  - Ceftriaxone 2g q12h IV  - Ampicillin 2g q 4h IV  - Plan for likely 6 week IV course  - Hold Doxy ppx while on above abx  - Weekly CBC, BMP  - ID follow-up - appt 6/25/19 Tues 11:15 AM (please place in DC summary)       [address]       [phone]       Fax [phone]

## 2019-06-13 NOTE — CONSULT NOTE ADULT - REASON FOR ADMISSION
Fever, weakness and dyspnea on exertion

## 2019-06-13 NOTE — CONSULT NOTE ADULT - SUBJECTIVE AND OBJECTIVE BOX
Surgeon: /Cony/ Rachel    Consult requesting by: Dr. Avalos    HISTORY OF PRESENT ILLNESS:  68M with PMHx of HTN, BPH, Afib on Eliquis, SAVR (complicated by aortic aneurysm and infective endocarditis in 2011 and 2012) s/p PPM on daily doxycycline for ppx presents for weakness and fever which began the morning of presentation. Patient states he developed sudden onset weakness of lower extremities this AM and feelings of lethargy. Symptoms associated with increased shortness of breath on exertion and subjective fever. He has mild headache but denies any changes in vision or nuchal rigidity. Patient denies any cough, cp, palpitations, nausea, vomiting, diarrhea, constipation, dysuria, abdominal pain, recent travel, recent sick contact, skin tears. Of note patient had 3rd surgical AVR after developing infective endocarditis in 2012. At that time his symptoms were worse. He had severe headache, finger pain with splinter hemorrhage. He was placed on daily doxycycline by cardiology since 2012.  Pt initially has + enterococcus in blood, treated with iv ceftriaxone and ampicillin. Last blood culture neg to date, pt is afebrile with normal wbc. Ct surgery called to evaluate after + gallium showed uptake around xiphoid process and midline, ct chest showed fluid attenuation posterior sternum and xiphoid process.     PAST MEDICAL & SURGICAL HISTORY:  Infective endocarditis  History of BPH  Dyslipidemia  Hypertension  H/O: osteoarthritis  Atrial fibrillation  S/P AVR (aortic valve replacement): complicated by aortic aneurysm and infective endocarditis      MEDICATIONS  (STANDING):  ALBUTerol/ipratropium for Nebulization 3 milliLiter(s) Nebulizer every 6 hours  ampicillin  IVPB      ampicillin  IVPB 2 Gram(s) IV Intermittent every 4 hours  apixaban 5 milliGRAM(s) Oral every 12 hours  aspirin enteric coated 81 milliGRAM(s) Oral daily  atorvastatin 40 milliGRAM(s) Oral at bedtime  cefTRIAXone   IVPB      cefTRIAXone   IVPB 2 Gram(s) IV Intermittent every 12 hours  metoprolol tartrate 25 milliGRAM(s) Oral two times a day  simethicone 80 milliGRAM(s) Chew every 8 hours  tamsulosin 0.8 milliGRAM(s) Oral at bedtime    MEDICATIONS  (PRN):  acetaminophen   Tablet .. 650 milliGRAM(s) Oral every 6 hours PRN Temp greater or equal to 38C (100.4F)  ibuprofen  Tablet. 400 milliGRAM(s) Oral every 6 hours PRN Mild Pain (1 - 3)    Antiplatelet therapy:                           Last dose/amt:    Allergies    No Known Allergies    Intolerances        SOCIAL HISTORY:  Smoker: [ ] Yes  [x ] No        PACK YEARS:                         WHEN QUIT?  ETOH use: [ ] Yes  [ x] No              FREQUENCY / QUANTITY:  Ilicit Drug use:  [ ] Yes  [ x] No        Review of Systems  CONSTITUTIONAL:  Fevers[ ] chills[ ] sweats[ ] fatigue[ ] weight loss[ ] weight gain [ ]                                     NEGATIVE [X ]   NEURO:  parathesias[ ] seizures [ ]  syncope [ ]  confusion [ ]                                                                                NEGATIVE[ X]   EYES: glasses[ ]  blurry vision[ ]  discharge[ ] pain[ ] glaucoma [ ]                                                                          NEGATIVE[X ]   ENMT:  difficulty hearing [ ]  vertigo[ ]  dysphagia[ ] epistaxis[ ] recent dental work [ ]                                    NEGATIVE[ X]   CV:  chest pain[ ] palpitations[ ] ROBERT [ ] diaphoresis [ ]                                                                                           NEGATIVE[ X]   RESPIRATORY:  wheezing[ ] SOB[ ] cough [ ] sputum[ ] hemoptysis[ ]                                                                  NEGATIVE[ ]   GI:  nausea[ ]  vommiting [ ]  diarrhea[ ] constipation [ ] melena [ ]                                                                      NEGATIVE[ X]   : hematuria[ ]  dysuria[ ] urgency[ ] incontinence[ ]                                                                                            NEGATIVE[ X]   MUSKULOSKELETAL:  arthritis[ ]  joint swelling [ ] muscle weakness [ ] Hx vein stripping [ ]                             NEGATIVE[X ]   SKIN/BREAST:  rash[ ] itching [ ]  hair loss[ ] masses[ ]                                                                                              NEGATIVE[ X]   PSYCH:  dementia [ ] depresion [ ] anxiety[ ]                                                                                                               NEGATIVE[X ]   HEME/LYMPH:  bruises easily[ ] enlarged lymph nodes[ ] tender lymph nodes[ ]                                               NEGATIVE[ X]   ENDOCRINE:  cold intolerance[ ] heat intolerance[ ] polydipsia[ ]                                                                          NEGATIVE[ X]     PHYSICAL EXAM  Vital Signs Last 24 Hrs  T(C): 36.4 (13 Jun 2019 14:29), Max: 37.7 (12 Jun 2019 21:30)  T(F): 97.5 (13 Jun 2019 14:29), Max: 99.8 (12 Jun 2019 21:30)  HR: 71 (13 Jun 2019 14:29) (69 - 71)  BP: 151/80 (13 Jun 2019 14:29) (116/61 - 151/80)  BP(mean): --  RR: 18 (13 Jun 2019 14:29) (18 - 18)  SpO2: --      CONSTITUTIONAL:                                                                          WNL[x ]   Neuro: WNL[x ] Normal exam oriented to person/place & time with no focal motor or sensory  deficits. Other                     Eyes: WNL[ x]   Normal exam of conjunctiva & lids, pupils equally reactive. Other     ENT: WNL[x ]    Normal exam of nasal/oral mucosa with absence of cyanosis. Other  Neck: WNL[x ]  Normal exam of jugular veins, trachea & thyroid. Other  Chest: WNL[x ] Normal lung exam with good air movement absence of wheezes, rales, or rhonchi: Other                                                                                CV:  Auscultation: normal [ x] S3[ ] S4[ ] Irregular [ ] Rub[ ] Clicks[ ]    Murmurs none:[x ]systolic [ ]  diastolic [ ] holosystolic [ ]  Carotids: No Bruits[x ] Other                 Abdominal Aorta: normal [ ] nonpalpable[ ]Other                                                                                      GI:           WNL[ x] Normal exam of abdomen, liver & spleen with no noted masses or tenderness. Other                                                                                                        Extremities: WNL[x ] Normal no evidence of cyanosis or deformity Edema: none[x ]trace[ ]1+[ ]2+[ ]3+[ ]4+[ ]  Lower Extremity Pulses: Right[ ] Left[ ]Varicosities[ ]  SKIN :WNL[ ] Normal exam to inspection & palation. Other: + sternotomy scar, healed well, no signs of infections                                                          LABS:                        13.1   7.27  )-----------( 195      ( 13 Jun 2019 07:16 )             38.9     06-13    140  |  104  |  11  ----------------------------<  106<H>  3.9   |  22  |  0.8    Ca    8.5      13 Jun 2019 07:16  Mg     1.9     06-12        TTE / MELVIN: < from: Transthoracic Echocardiogram (06.08.19 @ 17:12) >  Summary:   1. Normal global left ventricular systolic function.   2. LV Ejection Fractionby Keith's Method with a biplane EF of 64 %.   3. Elevated left atrial and left ventricular end-diastolic pressures.   4. Mild concentric left ventricular hypertrophy.   5. Mildly increased LV wall thickness.   6. Normal left ventricular internal cavity size.   7. Spectral Doppler shows pseudonormal pattern of left ventricular   myocardial filling (Grade II diastolic dysfunction).   8. 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   9. LA volume Index is 35.4 ml/m² ml/m2.    PHYSICIAN INTERPRETATION:  Left Ventricle: Normal left ventricular size and wall thicknesses, with   normal systolic and diastolic function. The left ventricular internal   cavity size is normal. Left ventricular wall thickness is mildly   increased. There is mild concentric left ventricular hypertrophy. Global   LV systolic function was normal. Spectral Doppler shows pseudonormal   pattern of left ventricular myocardial filling (Grade II diastolic   dysfunction). Elevated left atrial and left ventricular end-diastolic   pressures.  Right Ventricle: Normal right ventricular size and function.  Left Atrium: Mildly enlarged left atrium. LA volume Index is 35.4 ml/m²   ml/m2.  Right Atrium: Normal right atrial size.  Pericardium: There is no evidence of pericardial effusion.  Mitral Valve: Structurally normal mitral valve, with normal leaflet   excursion. The mitral valve is normal in structure. Mild mitral valve   regurgitation is seen.  Tricuspid Valve: Structurally normal tricuspid valve, with normal leaflet   excursion. The tricuspid valve is normal in structure. Mild tricuspid   regurgitation is visualized.  Aortic Valve: The aortic valve is normal. Echo findings are consistent   with normal structure and function of the aortic prosthesis. No evidence   of aortic valve regurgitation is seen.  Pulmonic Valve: Structurally normal pulmonic valve, with normal leaflet   excursion. The pulmonic valve is normal. No indication of pulmonic valve   regurgitation.  Aorta: The aortic root and ascending aorta are structurally normal, with   no evidence of dilitation.  Pulmonary Artery: The main pulmonary artery is normal in size.  Venous: The inferior vena cava was normal sized, with respiratory size   variation less than 50%.       < end of copied text >      Recommendation: (Procedures/Evaluations)  CT HEAD Nonn-Contrast:[  ]  CT Chest with /without contrast [ ]  Carotid Duplex :[ ]  JAYCEE/PVR: [ ]  PFT : Simple PFT [ ]  Full [ ]  Renal Consult [ ]  Pulmonary Consult: [ ]   Vascular Consult [ ]    Dental Consult [ ]   Hem-Onc Consult [ ]   GI Consult [ ]   Other Consultations :    STS Score:     Impression:    CAD [ ]  Valvular  disease [ ]   Aortic Disease [ ]   SAMANTHA: Yes[ ] No [ ]   CKD Stage I [ ] , Stage II [ ] , Stage III [ ], Stage IV [ ]   Anemia: Yes [ ], No [ ]  Diabetes :Yes [ ], No [ ]  Acute MI: Yes [ ], No [ ]   Heart Failure: Yes [ ] , No [ ] HFpEF [ ], HFrEF [ ]        Assessment/ Plan:    68 male with hx of SAVR x 3, last being in 2012 at [facility], presented with weakness and subjective fever, found tohave + enterococcus in the blood, now negative to date, on Iv ceftriaxone and ampicillin. + gallium uptake in midline and xiphoid area, as well as attenuation on CT chest in posterior sternum and xiphoid.  -ct surgery called to evaluate patient

## 2019-06-13 NOTE — PROGRESS NOTE ADULT - ASSESSMENT
enteroccal bacteremia/ sepsis  prothetic av valve  chronic a fib  dyslip  htn  bph    ct surg  id eval   iv abx  gallium scan noted  picc line in place

## 2019-06-13 NOTE — DISCHARGE NOTE NURSING/CASE MANAGEMENT/SOCIAL WORK - NSDCDPATPORTLINK_GEN_ALL_CORE
You can access the [facility] Portal, offered by [facility], by registering with the following website: [email or web address]

## 2019-06-13 NOTE — DISCHARGE NOTE PROVIDER - NSDCCPTREATMENT_GEN_ALL_CORE_FT
PRINCIPAL PROCEDURE  Procedure: Transesophageal echocardiogram  Findings and Treatment: no vegetation  normal functioning bioprosthetic aortic valve, normal mitral and tricuspid valve  clear PM wires, 3 wires detected  clear CIRILO, no mass or thrombus in the chambers  normal EF 60-65%  no vegetation or abnormality in ascending aorta, normal ascending aortic prosthesis  moderate MR, no AI, trace TR

## 2019-06-13 NOTE — DISCHARGE NOTE PROVIDER - NSDCCPCAREPLAN_GEN_ALL_CORE_FT
PRINCIPAL DISCHARGE DIAGNOSIS  Diagnosis: Bacteremia due to Enterococcus  Assessment and Plan of Treatment: Your symptoms are likely caused by bacteria found in your blood. The repeated blood cultures have been no growth to date. Infectious Disease physician has evaluated you and you will need to have IV antibiotics for a total of 6 weeks (tentatively ends on 7/21). Please follow up with Dr. Antonino Avalos at [address] [facility] weekly and have weekly blood draws.      SECONDARY DISCHARGE DIAGNOSES  Diagnosis: Afib  Assessment and Plan of Treatment: You have a history of atrial fibrillation. You will need to continue your prescribed medication, especially blood thinner. Please continue to follow up with your PCP routinely.

## 2019-06-13 NOTE — DISCHARGE NOTE PROVIDER - NSDCFUADDAPPT_GEN_ALL_CORE_FT
ID follow-up - appt 6/25/19 Tues 11:15 AM ID follow-up - appt 6/25/19 Tues 11:15 AM  Please follow up with Dr. Antonino Avalos weekly  Please follow up with Cardiothoracic surgeon at [facility]

## 2019-06-13 NOTE — DISCHARGE NOTE PROVIDER - CARE PROVIDER_API CALL
Antonino Avalos)  Infectious Disease; Internal Medicine  [address] [place] [address]  Phone: [phone]  Fax: [phone]  Follow Up Time:     Praneeth Larson)  Internal Medicine  [address] [address]  Phone: [phone]  Fax: [phone]  Follow Up Time: 1 week

## 2019-06-14 VITALS
TEMPERATURE: 97 F | RESPIRATION RATE: 20 BRPM | OXYGEN SATURATION: 94 % | DIASTOLIC BLOOD PRESSURE: 62 MMHG | SYSTOLIC BLOOD PRESSURE: 112 MMHG | HEART RATE: 70 BPM

## 2019-06-14 LAB
CULTURE RESULTS: SIGNIFICANT CHANGE UP
SPECIMEN SOURCE: SIGNIFICANT CHANGE UP

## 2019-06-14 RX ADMIN — Medication 216 GRAM(S): at 09:18

## 2019-06-14 RX ADMIN — APIXABAN 5 MILLIGRAM(S): 2.5 TABLET, FILM COATED ORAL at 05:52

## 2019-06-14 RX ADMIN — Medication 216 GRAM(S): at 05:53

## 2019-06-14 RX ADMIN — SIMETHICONE 80 MILLIGRAM(S): 80 TABLET, CHEWABLE ORAL at 05:52

## 2019-06-14 RX ADMIN — Medication 650 MILLIGRAM(S): at 01:01

## 2019-06-14 RX ADMIN — Medication 25 MILLIGRAM(S): at 05:52

## 2019-06-14 RX ADMIN — Medication 216 GRAM(S): at 01:00

## 2019-06-14 NOTE — PROGRESS NOTE ADULT - REASON FOR ADMISSION
Fever, weakness and dyspnea on exertion

## 2019-06-14 NOTE — PROGRESS NOTE ADULT - SUBJECTIVE AND OBJECTIVE BOX
Patient is a 68y old  Male who presents with a chief complaint of Fever, weakness and dyspnea on exertion (09 Jun 2019 08:07)      HPI:  68M with PMHx of HTN, BPH, Afib on Eliquis, SAVR (complicated by aortic aneurysm and infective endocarditis in 2011 and 2012) s/p PPM on daily doxycycline for ppx presents for weakness and fever which began the morning of presentation. Patient states he developed sudden onset weakness of lower extremities this AM and feelings of lethargy. Symptoms associated with increased shortness of breath on exertion and subjective fever. He has mild headache but denies any changes in vision or nuchal rigidity. Patient denies any cough, cp, palpitations, nausea, vomiting, diarrhea, constipation, dysuria, abdominal pain, recent travel, recent sick contact, skin tears. Of note patient had 3rd surgical AVR after developing infective endocarditis in 2012. At that time his symptoms were worse. He had severe headache, finger pain with splinter hemorrhage. He was placed on daily doxycycline by cardiology since 2012.     In ED: Vitals T 101.6, 125/65, HR 90, WBC 15K, received x1 Vancomycin, gentamycin, cefepime (08 Jun 2019 02:03)      INTERVAL HPI/OVERNIGHT EVENTS:  T(C): 36.8 (06-09-19 @ 06:19), Max: 38.1 (06-08-19 @ 23:48)  HR: 71 (06-09-19 @ 06:19) (70 - 82)  BP: 114/61 (06-09-19 @ 06:19) (109/64 - 116/59)  RR: 18 (06-09-19 @ 06:19) (17 - 20)  SpO2: 97% (06-08-19 @ 21:24) (97% - 97%)  Wt(kg): --  I&O's Summary    08 Jun 2019 07:01  -  09 Jun 2019 07:00  --------------------------------------------------------  IN: 1460 mL / OUT: 200 mL / NET: 1260 mL            PHYSICAL EXAM:  GENERAL: NAD,   HEAD:  Atraumatic, Normocephalic  EYES: EOMI, PERRLA, conjunctiva and sclera clear  ENMT: No tonsillar erythema, exudates, or enlargement; Moist mucous membranes, Good dentition, No lesions  NECK: Supple, No JVD, Normal thyroid  NERVOUS SYSTEM:  Alert & Oriented X3, Good concentration; Motor Strength 5/5 B/L upper and lower extremities; DTRs 2+ intact and symmetric  CHEST/LUNG: Clear to percussion bilaterally; No rales, rhonchi, wheezing, or rubs  HEART: Regular rate and rhythm; click sound   ABDOMEN: Soft, Nontender, Nondistended; Bowel sounds present  EXTREMITIES:  2+ Peripheral Pulses, No clubbing, cyanosis, or edema  LYMPH: No lymphadenopathy noted  SKIN: No rashes or lesions    MEDICATIONS  (STANDING):  ampicillin  IVPB 2 Gram(s) IV Intermittent every 4 hours  ampicillin  IVPB      apixaban 5 milliGRAM(s) Oral every 12 hours  aspirin enteric coated 81 milliGRAM(s) Oral daily  atorvastatin 40 milliGRAM(s) Oral at bedtime  gentamicin   IVPB 100 milliGRAM(s) IV Intermittent every 8 hours  metoprolol tartrate 25 milliGRAM(s) Oral two times a day  sodium chloride 0.9%. 1000 milliLiter(s) (100 mL/Hr) IV Continuous <Continuous>  vancomycin  IVPB 1500 milliGRAM(s) IV Intermittent every 12 hours    MEDICATIONS  (PRN):  acetaminophen   Tablet .. 650 milliGRAM(s) Oral every 6 hours PRN Temp greater or equal to 38C (100.4F)  ibuprofen  Tablet. 400 milliGRAM(s) Oral once PRN Temp greater or equal to 38C (100.4F)      LABS:                        12.5   7.79  )-----------( 104      ( 09 Jun 2019 07:57 )             37.6     06-09    140  |  107  |  12  ----------------------------<  151<H>  4.1   |  22  |  1.0    Ca    8.1<L>      09 Jun 2019 07:57    TPro  5.5<L>  /  Alb  3.5  /  TBili  0.9  /  DBili  x   /  AST  44<H>  /  ALT  41  /  AlkPhos  50  06-08    PT/INR - ( 07 Jun 2019 22:45 )   PT: 20.40 sec;   INR: 1.78 ratio         PTT - ( 07 Jun 2019 22:45 )  PTT:35.6 sec  Urinalysis Basic - ( 08 Jun 2019 00:15 )    Color: Dark Yellow / Appearance: Clear / SG: >=1.030 / pH: x  Gluc: x / Ketone: Trace  / Bili: Small / Urobili: 1.0   Blood: x / Protein: Trace / Nitrite: Negative   Leuk Esterase: Trace / RBC: x / WBC 3-5 /HPF   Sq Epi: x / Non Sq Epi: Few /HPF / Bacteria: occ /HPF      CAPILLARY BLOOD GLUCOSE          06-08 @ 06:05   Growth in aerobic bottle: Gram Positive Cocci in Pairs and Chains  --  --  06-07 @ 22:45   Growth in aerobic bottle: Gram Positive Cocci in Pairs and Chains  Growth in anaerobic bottle: Gram Positive Cocci in Pairs and Chains  "Due to technical problems, Proteus sp. will Not be reported as part of  the BCID panel until further notice"  ***Blood Panel PCR results on this specimen are available  approximately 3 hours after the Gram stain result.***  Gram stain, PCR, and/or culture results may not always  correspond due to difference in methodologies.  ************************************************************  This PCR assay was performed using [employer].  The following targets are tested for: Enterococcus,  vancomycin resistant enterococci, Listeria monocytogenes,  coagulase negative staphylococci, S. aureus,  methicillin resistant S. aureus, Streptococcus agalactiae  (Group B), S. pneumoniae, S. pyogenes (Group A),  Acinetobacter baumannii, Enterobacter cloacae, E. coli,  Klebsiella oxytoca, K. pneumoniae, Proteus sp.,  Serratia marcescens, Haemophilus influenzae,  Neisseria meningitidis, Pseudomonas aeruginosa, Candida  albicans, C. glabrata, C krusei, C parapsilosis,  C. tropicalis and the KPC resistance gene.  --  Blood Culture PCR          RADIOLOGY & ADDITIONAL TESTS:    Imaging Personally Reviewed:  [ ] YES  [ ] NO    Consultant(s) Notes Reviewed:  [ ] YES  [ ] NO    Palliative Care:          DVT ppx     Care Discussed with Consultants/Other Providers [ ] YES  [ ] NO
Patient was seen and examined. Spoke with RN. Chart reviewed.    No events overnight.  Vital Signs Last 24 Hrs  T(F): 97 (14 Jun 2019 05:41), Max: 98.1 (13 Jun 2019 21:35)  HR: 70 (14 Jun 2019 05:41) (70 - 74)  BP: 112/62 (14 Jun 2019 05:41) (112/62 - 151/80)  SpO2: 94% (14 Jun 2019 05:41) (94% - 96%)  MEDICATIONS  (STANDING):  ALBUTerol/ipratropium for Nebulization 3 milliLiter(s) Nebulizer every 6 hours  ampicillin  IVPB      ampicillin  IVPB 2 Gram(s) IV Intermittent every 4 hours  apixaban 5 milliGRAM(s) Oral every 12 hours  aspirin enteric coated 81 milliGRAM(s) Oral daily  atorvastatin 40 milliGRAM(s) Oral at bedtime  metoprolol tartrate 25 milliGRAM(s) Oral two times a day  simethicone 80 milliGRAM(s) Chew every 8 hours  tamsulosin 0.8 milliGRAM(s) Oral at bedtime    MEDICATIONS  (PRN):  acetaminophen   Tablet .. 650 milliGRAM(s) Oral every 6 hours PRN Temp greater or equal to 38C (100.4F)  ibuprofen  Tablet. 400 milliGRAM(s) Oral every 6 hours PRN Mild Pain (1 - 3)    Labs:                        13.1   7.27  )-----------( 195      ( 13 Jun 2019 07:16 )             38.9     13 Jun 2019 07:16    140    |  104    |  11     ----------------------------<  106    3.9     |  22     |  0.8      Ca    8.5        13 Jun 2019 07:16              Radiology:    General: comfortable, NAD  Neurology: A&Ox3, nonfocal  Head:  Normocephalic, atraumatic  ENT:  Mucosa moist, no ulcerations  Neck:  Supple, no JVD,   Resp: CTA B/L  CV: RRR, S1S2,   GI: Soft, NT, bowel sounds  MS: No edema, + peripheral pulses, FROM all 4 extremity
Patient was seen and examined. Spoke with RN. Chart reviewed.  family at bedside  disusion  fu gallium scan  picc line  d/w ho    No events overnight.  Vital Signs Last 24 Hrs  T(F): 98 (12 Jun 2019 05:36), Max: 98.2 (11 Jun 2019 21:08)  HR: 68 (12 Jun 2019 05:36) (68 - 70)  BP: 127/69 (12 Jun 2019 05:36) (127/69 - 150/72)  SpO2: 96% (12 Jun 2019 05:36) (95% - 96%)  MEDICATIONS  (STANDING):  ALBUTerol/ipratropium for Nebulization 3 milliLiter(s) Nebulizer every 6 hours  ampicillin  IVPB      ampicillin  IVPB 2 Gram(s) IV Intermittent every 4 hours  apixaban 5 milliGRAM(s) Oral every 12 hours  aspirin enteric coated 81 milliGRAM(s) Oral daily  atorvastatin 40 milliGRAM(s) Oral at bedtime  cefTRIAXone   IVPB 2 Gram(s) IV Intermittent every 12 hours  metoprolol tartrate 25 milliGRAM(s) Oral two times a day  simethicone 80 milliGRAM(s) Chew every 8 hours  tamsulosin 0.8 milliGRAM(s) Oral at bedtime    MEDICATIONS  (PRN):  acetaminophen   Tablet .. 650 milliGRAM(s) Oral every 6 hours PRN Temp greater or equal to 38C (100.4F)  ibuprofen  Tablet. 400 milliGRAM(s) Oral every 6 hours PRN Mild Pain (1 - 3)    Labs:                        12.9   7.23  )-----------( 154      ( 12 Jun 2019 07:00 )             38.2                         12.7   6.79  )-----------( 145      ( 11 Jun 2019 07:56 )             37.1     12 Jun 2019 07:00    143    |  108    |  10     ----------------------------<  116    4.0     |  23     |  0.8    11 Jun 2019 07:56    140    |  106    |  10     ----------------------------<  155    3.9     |  23     |  0.9      Ca    8.5        12 Jun 2019 07:00  Ca    8.2        11 Jun 2019 07:56  Mg     1.9       12 Jun 2019 07:00  Mg     2.0       11 Jun 2019 07:56      PT/INR - ( 11 Jun 2019 07:56 )   PT: 16.90 sec;   INR: 1.48 ratio         PTT - ( 11 Jun 2019 07:56 )  PTT:38.3 sec      Culture - Blood (collected 11 Jun 2019 07:56)  Source: .Blood None  Preliminary Report (12 Jun 2019 14:01):    No growth to date.    Culture - Blood (collected 10 Ward 2019 06:19)  Source: .Blood None  Preliminary Report (11 Jun 2019 13:01):    No growth to date.    Culture - Blood (collected 10 Ward 2019 06:19)  Source: .Blood None  Preliminary Report (11 Jun 2019 13:01):    No growth to date.        Radiology:    General: comfortable, NAD  Neurology: A&Ox3, nonfocal  Head:  Normocephalic, atraumatic  ENT:  Mucosa moist, no ulcerations  Neck:  Supple, no JVD,   Skin: no breakdown  Resp: CTA B/L  CV: RRR, S1S2,   GI: Soft, NT, bowel sounds  MS: No edema, + peripheral pulses, FROM all 4 extremity
Patient was seen and examined. Spoke with RN. Chart reviewed.  family at bedside, extensive discussion,  gallium scan  awaiting repeat bld cx  continue iv abx  d/w ho,    No events overnight.  Vital Signs Last 24 Hrs  T(F): 97.6 (10 Ward 2019 12:12), Max: 98.2 (09 Jun 2019 22:13)  HR: 70 (10 Ward 2019 12:12) (69 - 73)  BP: 149/79 (10 Ward 2019 12:12) (109/55 - 149/79)  SpO2: 97% (10 Ward 2019 12:12) (95% - 98%)  MEDICATIONS  (STANDING):  ampicillin  IVPB      ampicillin  IVPB 2 Gram(s) IV Intermittent every 4 hours  apixaban 5 milliGRAM(s) Oral every 12 hours  aspirin enteric coated 81 milliGRAM(s) Oral daily  atorvastatin 40 milliGRAM(s) Oral at bedtime  cefTRIAXone   IVPB 2 Gram(s) IV Intermittent every 12 hours  metoprolol tartrate 25 milliGRAM(s) Oral two times a day  simethicone 80 milliGRAM(s) Chew every 8 hours  tamsulosin 0.8 milliGRAM(s) Oral at bedtime  vancomycin  IVPB 1500 milliGRAM(s) IV Intermittent every 12 hours    MEDICATIONS  (PRN):  acetaminophen   Tablet .. 650 milliGRAM(s) Oral every 6 hours PRN Temp greater or equal to 38C (100.4F)  ibuprofen  Tablet. 400 milliGRAM(s) Oral every 6 hours PRN Mild Pain (1 - 3)    Labs:                        13.6   7.84  )-----------( 122      ( 10 Ward 2019 06:19 )             40.4                         12.5   7.79  )-----------( 104      ( 09 Jun 2019 07:57 )             37.6     10 Ward 2019 06:19    142    |  108    |  9      ----------------------------<  92     4.4     |  23     |  0.8    09 Jun 2019 07:57    140    |  107    |  12     ----------------------------<  151    4.1     |  22     |  1.0      Ca    8.4        10 Ward 2019 06:19  Ca    8.1        09 Jun 2019 07:57  Mg     2.0       10 Ward 2019 06:19            Culture - Blood (collected 08 Jun 2019 06:05)  Source: .Blood None  Gram Stain (09 Jun 2019 11:40):    Growth in aerobic bottle: Gram Positive Cocci in Pairs and Chains    Growth in anaerobic bottle: Gram Positive Cocci in Pairs and Chains  Final Report (10 Ward 2019 14:54):    Growth in aerobic and anaerobic bottles: Enterococcus faecalis    See previous culture [id number]    Culture - Blood (collected 07 Jun 2019 22:45)  Source: .Blood Blood  Gram Stain (08 Jun 2019 14:23):    Growth in aerobic and anaerobic bottles: Gram Positive Cocci in Pairs and    Chains  Final Report (10 Ward 2019 14:44):    Growth in aerobic and anaerobic bottles: Enterococcus faecalis    See previous culture [id number]    Culture - Blood (collected 07 Jun 2019 22:45)  Source: .Blood Blood  Gram Stain (08 Jun 2019 14:29):    Growth in aerobic bottle: Gram Positive Cocci in Pairs and Chains    Growth in anaerobic bottle: Gram Positive Cocci in Pairs and Chains  Final Report (10 Ward 2019 14:01):    Growth in aerobic and anaerobic bottles: Enterococcus faecalis    "Due to technical problems, Proteus sp. will Not be reported as part of    the BCID panel until further notice"    ***Blood Panel PCR results on this specimen are available    approximately 3 hours after the Gram stain result.***    Gram stain, PCR, and/or culture results may not always    correspond due to difference in methodologies.    ************************************************************    This PCR assay was performed using [employer].    The following targets are tested for: Enterococcus,    vancomycin resistant enterococci, Listeria monocytogenes,    coagulase negative staphylococci, S. aureus,    methicillin resistant S. aureus, Streptococcus agalactiae    (Group B), S. pneumoniae, S. pyogenes (Group A),    Acinetobacter baumannii, Enterobacter cloacae, E. coli,    Klebsiella oxytoca, K. pneumoniae, Proteus sp.,    Serratia marcescens, Haemophilus influenzae,    Neisseria meningitidis, Pseudomonas aeruginosa, Candida    albicans, C. glabrata, C krusei, C parapsilosis,    C. tropicalis and the KPC resistance gene.  Organism: Blood Culture PCR  Enterococcus faecalis (10 Ward 2019 14:01)  Organism: Enterococcus faecalis (10 Ward 2019 14:01)  Organism: Blood Culture PCR (10 Ward 2019 14:01)        Radiology:    General: comfortable, NAD  Neurology: A&Ox3, nonfocal  Head:  Normocephalic, atraumatic  ENT:  Mucosa moist, no ulcerations  Neck:  Supple, no JVD,   Skin: no breakdown  Resp: CTA B/L  CV: RRR, S1S2,   GI: Soft, NT, bowel sounds  MS: No edema, + peripheral pulses, FROM all 4 extremity
Patient was seen and examined. Spoke with RN. Chart reviewed.  s/p picc  gallium scan  awaiting ct surg eval for ?necessity of I&D    No events overnight.  Vital Signs Last 24 Hrs  T(F): 97.3 (13 Jun 2019 05:35), Max: 99.8 (12 Jun 2019 21:30)  HR: 70 (13 Jun 2019 05:35) (69 - 70)  BP: 116/61 (13 Jun 2019 05:35) (116/61 - 139/84)  SpO2: --  MEDICATIONS  (STANDING):  ALBUTerol/ipratropium for Nebulization 3 milliLiter(s) Nebulizer every 6 hours  ampicillin  IVPB      ampicillin  IVPB 2 Gram(s) IV Intermittent every 4 hours  apixaban 5 milliGRAM(s) Oral every 12 hours  aspirin enteric coated 81 milliGRAM(s) Oral daily  atorvastatin 40 milliGRAM(s) Oral at bedtime  cefTRIAXone   IVPB      cefTRIAXone   IVPB 2 Gram(s) IV Intermittent every 12 hours  metoprolol tartrate 25 milliGRAM(s) Oral two times a day  simethicone 80 milliGRAM(s) Chew every 8 hours  tamsulosin 0.8 milliGRAM(s) Oral at bedtime    MEDICATIONS  (PRN):  acetaminophen   Tablet .. 650 milliGRAM(s) Oral every 6 hours PRN Temp greater or equal to 38C (100.4F)  ibuprofen  Tablet. 400 milliGRAM(s) Oral every 6 hours PRN Mild Pain (1 - 3)    Labs:                        13.1   7.27  )-----------( 195      ( 13 Jun 2019 07:16 )             38.9                         12.9   7.23  )-----------( 154      ( 12 Jun 2019 07:00 )             38.2     13 Jun 2019 07:16    140    |  104    |  11     ----------------------------<  106    3.9     |  22     |  0.8    12 Jun 2019 07:00    143    |  108    |  10     ----------------------------<  116    4.0     |  23     |  0.8      Ca    8.5        13 Jun 2019 07:16  Ca    8.5        12 Jun 2019 07:00  Mg     1.9       12 Jun 2019 07:00            Culture - Blood (collected 11 Jun 2019 07:56)  Source: .Blood None  Preliminary Report (12 Jun 2019 14:01):    No growth to date.        Radiology:    General: comfortable, NAD  Neurology: A&Ox3, nonfocal  Head:  Normocephalic, atraumatic  ENT:  Mucosa moist, no ulcerations  Neck:  Supple, no JVD,   Skin: no breakdowns (as per RN)  Resp: CTA B/L  CV: RRR, S1S2,   GI: Soft, NT, bowel sounds  MS: No edema, + peripheral pulses, FROM all 4 extremity
RODRICK IQBAL  68y, Male  Allergy: No Known Allergies      CHIEF COMPLAINT: Fever, weakness and dyspnea on exertion (10 Ward 2019 10:04)      INTERVAL EVENTS/HPI  - No acute events overnight  - MELVIN negative for vegetation  - T(F): , Max: 98.2 (06-09-19 @ 22:13)  - Denies any worsening symptoms  - Tolerating medication  - WBC Count: 7.84 K/uL (06-10-19 @ 06:19)      ROS  Negative except as per noted above in HPI  Denies cough  Denies diarrhea  Denies dysuria   Denies pain at any IV site     FH noncontributory    VITALS:  T(F): 96.6, Max: 98.2 (06-09-19 @ 22:13)  HR: 70  BP: 120/75  RR: 18Vital Signs Last 24 Hrs  T(C): 35.9 (10 Ward 2019 10:10), Max: 36.8 (09 Jun 2019 22:13)  T(F): 96.6 (10 Ward 2019 10:10), Max: 98.2 (09 Jun 2019 22:13)  HR: 70 (10 Ward 2019 10:10) (69 - 73)  BP: 120/75 (10 Ward 2019 10:10) (109/55 - 120/75)  BP(mean): --  RR: 18 (10 Ward 2019 10:10) (18 - 20)  SpO2: 98% (10 Ward 2019 10:10) (95% - 98%)    PHYSICAL EXAM:  Gen: NAD, resting in bed  HEENT: Normocephalic, atraumatic  Neck: supple, no lymphadenopathy  CV: Regular rate & regular rhythm, SHA, R chest PM clean  Lungs: decreased BS at bases, no fremitus  Abdomen: Soft, BS present  Ext: Warm, well perfused  Rectal: no ext hemorrhoids  Neuro: non focal, awake  Skin: no rash, no erythema  Lines: no phlebitis      TESTS & MEASUREMENTS:                        13.6   7.84  )-----------( 122      ( 10 Ward 2019 06:19 )             40.4     06-10    142  |  108  |  9<L>  ----------------------------<  92  4.4   |  23  |  0.8    Ca    8.4<L>      10 Ward 2019 06:19  Mg     2.0     06-10      eGFR if Non African American: 91 mL/min/1.73M2 (06-10-19 @ 06:19)  eGFR if African American: 106 mL/min/1.73M2 (06-10-19 @ 06:19)          Culture - Blood (collected 06-08-19 @ 06:05)  Source: .Blood None  Gram Stain (06-09-19 @ 11:40):    Growth in aerobic bottle: Gram Positive Cocci in Pairs and Chains    Growth in anaerobic bottle: Gram Positive Cocci in Pairs and Chains  Preliminary Report (06-09-19 @ 11:40):    Growth in aerobic bottle: Gram Positive Cocci in Pairs and Chains    Growth in anaerobic bottle: Gram Positive Cocci in Pairs and Chains    Culture - Blood (collected 06-07-19 @ 22:45)  Source: .Blood Blood  Gram Stain (06-08-19 @ 14:23):    Growth in aerobic and anaerobic bottles: Gram Positive Cocci in Pairs and    Chains  Preliminary Report (06-09-19 @ 12:00):    Growth in aerobic and anaerobic bottles: Enterococcus species    See previous culture [id number]    Culture - Blood (collected 06-07-19 @ 22:45)  Source: .Blood Blood  Gram Stain (06-08-19 @ 14:29):    Growth in aerobic bottle: Gram Positive Cocci in Pairs and Chains    Growth in anaerobic bottle: Gram Positive Cocci in Pairs and Chains  Preliminary Report (06-08-19 @ 14:29):    Growth in aerobic bottle: Gram Positive Cocci in Pairs and Chains    Growth in anaerobic bottle: Gram Positive Cocci in Pairs and Chains    "Due to technical problems, Proteus sp. will Not be reported as part of    the BCID panel until further notice"    ***Blood Panel PCR results on this specimen are available    approximately 3 hours after the Gram stain result.***    Gram stain, PCR, and/or culture results may not always    correspond due to difference in methodologies.    ************************************************************    This PCR assay was performed using [employer].    The following targets are tested for: Enterococcus,    vancomycin resistant enterococci, Listeria monocytogenes,    coagulase negative staphylococci, S. aureus,    methicillin resistant S. aureus, Streptococcus agalactiae    (Group B), S. pneumoniae, S. pyogenes (Group A),    Acinetobacter baumannii, Enterobacter cloacae, E. coli,    Klebsiella oxytoca, K. pneumoniae, Proteus sp.,    Serratia marcescens, Haemophilus influenzae,    Neisseria meningitidis, Pseudomonas aeruginosa, Candida    albicans, C. glabrata, C krusei, C parapsilosis,    C. tropicalis and the KPC resistance gene.  Organism: Blood Culture PCR (06-08-19 @ 15:31)  Organism: Blood Culture PCR (06-08-19 @ 15:31)      -  Enterococcus species: Detec      Method Type: PCR        Blood Gas Venous - Lactate: 1.4 mmoL/L (06-07-19 @ 22:53)  Lactate, Blood: 1.6 mmol/L (06-07-19 @ 22:45)      INFECTIOUS DISEASES TESTING      RADIOLOGY & ADDITIONAL TESTS:  I have personally reviewed the last Chest xray  CXR      CT      CARDIOLOGY TESTING  Transthoracic Echocardiogram:    EXAM:  2-D ECHO (TTE) COMPLETE        PROCEDURE DATE:  06/08/2019      INTERPRETATION:  REPORT:    TRANSTHORACIC ECHOCARDIOGRAM REPORT         Patient Name:   RODRICK IQBAL Accession #: [id number]  Medical Rec #:  [id number]        Height:      71.0in 180.3 cm  YOB: 1950       Weight:      325.0 lb 147.42 kg  Patient Age:    68 years        BSA:         2.59 m²  Patient Gender: M               BP:          112/75 mmHg       Date of Exam:        6/8/2019 5:12:10 PM  Referring Physician: [id number] MAC MORIN  Sonographer:         Chastity Joshi  Reading Physician:   Fortino Macias MD.    Procedure:     2D Echo/Doppler/Color Doppler Complete.  Indications:   I33.0 - Infective Endocarditis  Diagnosis:     I33.0 - Infective Endocarditis  Study Details: Technically difficult study. Study quality was adversely   affected                 due to body habitus and patient obesity.         Summary:   1. Normal global left ventricular systolic function.   2. LV Ejection Fractionby Keith's Method with a biplane EF of 64 %.   3. Elevated left atrial and left ventricular end-diastolic pressures.   4. Mild concentric left ventricular hypertrophy.   5. Mildly increased LV wall thickness.   6. Normal left ventricular internal cavity size.   7. Spectral Doppler shows pseudonormal pattern of left ventricular   myocardial filling (Grade II diastolic dysfunction).   8. 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   9. LA volume Index is 35.4 ml/m² ml/m2.    PHYSICIAN INTERPRETATION:  Left Ventricle: Normal left ventricular size and wall thicknesses, with   normal systolic and diastolic function. The left ventricular internal   cavity size is normal. Left ventricular wall thickness is mildly   increased. There is mild concentric left ventricular hypertrophy. Global   LV systolic function was normal. Spectral Doppler shows pseudonormal   pattern of left ventricular myocardial filling (Grade II diastolic   dysfunction). Elevated left atrial and left ventricular end-diastolic   pressures.  Right Ventricle: Normal right ventricular size and function.  Left Atrium: Mildly enlarged left atrium. LA volume Index is 35.4 ml/m²   ml/m2.  Right Atrium: Normal right atrial size.  Pericardium: There is no evidence of pericardial effusion.  Mitral Valve: Structurally normal mitral valve, with normal leaflet   excursion. The mitral valve is normal in structure. Mild mitral valve   regurgitation is seen.  Tricuspid Valve: Structurally normal tricuspid valve, with normal leaflet   excursion. The tricuspid valve is normal in structure. Mild tricuspid   regurgitation is visualized.  Aortic Valve: The aortic valve is normal. Echo findings are consistent   with normal structure and function of the aortic prosthesis. No evidence   of aortic valve regurgitation is seen.  Pulmonic Valve: Structurally normal pulmonic valve, with normal leaflet   excursion. The pulmonic valve is normal. No indication of pulmonic valve   regurgitation.  Aorta: The aortic root and ascending aorta are structurally normal, with   no evidence of dilitation.  Pulmonary Artery: The main pulmonary artery is normal in size.  Venous: The inferior vena cava was normal sized, with respiratory size   variation less than 50%.       2D AND M-MODE MEASUREMENTS (normal ranges within parentheses):  Left                  Normal   Aorta/Left             Normal  Ventricle:                     Atrium:  IVSd        1.48 cm  (0.7-1.1) AoV Cusp       1.68  (1.5-2.6)  (Mmode):                       Separation:     cm  LVPWd       1.15 cm  (0.7-1.1) Left Atrium    4.61  (1.9-4.0)  (Mmode):                       (Mmode):        cm  LVIDd       5.49 cm  (3.4-5.7) LA Volume      35.3  (Mmode):                       Index         ml/m²  LVIDs       3.91 cm            Right  (Mmode):                       Ventricle:  LV FS        28.7 %   (>25%)   TAPSE:         1.50 cm  (Mmode):  Rel. Wall     0.42    (<0.42)  Thickness  Mm  LV Mass      118.6  Index:        g/m²  Mmode    SPECTRAL DOPPLER ANALYSIS:  LV DIASTOLIC FUNCTION:  MV Peak E: 1.13 m/s Decel Time: 279 msec  MV Peak A: 0.74 m/s  E/A Ratio: 1.53    Aortic Valve:  AoV VMax:    1.58 m/s  AoV VTI:     0.31 m  AoV Pk Grad: 9.9 mmHg  AoV Mn Grad: 5.8 mmHg    LVOT Vmax: 1.15 m/s  LVOT VTI:  0.22 m    Mitral Valve:  MV P1/2 Time: 80.98 msec  MV Area, PHT: 2.72 cm²    Tricuspid Valve and PA/RV Systolic Pressure: TR Max Velocity: 2.34 m/s RA   Pressure: 8 mmHg RVSP/PASP: 29.9 mmHg       [id number] Fortino Macias MD, Electronically signed on 6/8/2019 at 10:20:23   PM         *** Final ***                    FORTINO MACIAS MD  This document has been electronically signed. Jun 8 2019  5:12PM             (06-08-19 @ 17:12)  12 Lead ECG:   Ventricular Rate 82 BPM    Atrial Rate 82 BPM    P-R Interval 130 ms    QRS Duration 136 ms    Q-T Interval 450 ms    QTC Calculation(Bezet) 525 ms    P Axis 87 degrees    R Axis -87 degrees    T Axis 59 degrees    Diagnosis Line Sinus rhythm  Ventricular pacing  Abnormal ECG    Confirmed by FORTINO MACIAS MD ([id number]) on 6/8/2019 6:14:48 AM (06-07-19 @ 23:28)      MEDICATIONS  ampicillin  IVPB   ampicillin  IVPB 2  apixaban 5  aspirin enteric coated 81  atorvastatin 40  gentamicin   IVPB 100  metoprolol tartrate 25  simethicone 80  tamsulosin 0.8  vancomycin  IVPB 1500      ANTIBIOTICS:  ampicillin  IVPB      ampicillin  IVPB 2 Gram(s) IV Intermittent every 4 hours  gentamicin   IVPB 100 milliGRAM(s) IV Intermittent every 8 hours  vancomycin  IVPB 1500 milliGRAM(s) IV Intermittent every 12 hours
RODRICK IQBAL  68y, Male  Allergy: No Known Allergies      CHIEF COMPLAINT: Fever, weakness and dyspnea on exertion (10 Ward 2019 16:36)      INTERVAL EVENTS/HPI  - No acute events overnight  - 6/9 BCX NG  - T(F): , Max: 97.9 (06-10-19 @ 21:44)  - Denies any worsening symptoms  - Tolerating medication  - WBC Count: 6.79 K/uL (06-11-19 @ 07:56)      ROS  Negative except as per noted above in HPI  Denies cough  Denies diarrhea  Denies dysuria   Denies pain at any IV site     FH noncontributory    VITALS:  T(F): 97.9, Max: 97.9 (06-10-19 @ 21:44)  HR: 70  BP: 121/59  RR: 18Vital Signs Last 24 Hrs  T(C): 36.6 (11 Jun 2019 05:22), Max: 36.6 (10 Ward 2019 21:44)  T(F): 97.9 (11 Jun 2019 05:22), Max: 97.9 (10 Ward 2019 21:44)  HR: 70 (11 Jun 2019 05:22) (68 - 70)  BP: 121/59 (11 Jun 2019 05:22) (121/59 - 157/87)  BP(mean): --  RR: 18 (11 Jun 2019 05:22) (18 - 20)  SpO2: 98% (10 Ward 2019 17:00) (97% - 98%)    PHYSICAL EXAM:  Gen: NAD, resting in bed  HEENT: Normocephalic, atraumatic  Neck: supple, no lymphadenopathy  CV: Regular rate & regular rhythm, SHA, R chest PM clean  Lungs: decreased BS at bases, no fremitus  Abdomen: Soft, BS present  Ext: Warm, well perfused  Rectal: no ext hemorrhoids  Neuro: non focal, awake  Skin: no rash, no erythema  Lines: no phlebitis        TESTS & MEASUREMENTS:                        12.7   6.79  )-----------( 145      ( 11 Jun 2019 07:56 )             37.1     06-11    140  |  106  |  10  ----------------------------<  155<H>  3.9   |  23  |  0.9    Ca    8.2<L>      11 Jun 2019 07:56  Mg     2.0     06-11      eGFR if Non African American: 87 mL/min/1.73M2 (06-11-19 @ 07:56)  eGFR if : 101 mL/min/1.73M2 (06-11-19 @ 07:56)          Culture - Blood (collected 06-09-19 @ 07:57)  Source: .Blood None  Preliminary Report (06-10-19 @ 17:01):    No growth to date.    Culture - Blood (collected 06-09-19 @ 07:57)  Source: .Blood None  Preliminary Report (06-10-19 @ 17:01):    No growth to date.    Culture - Blood (collected 06-09-19 @ 07:57)  Source: .Blood None  Preliminary Report (06-10-19 @ 17:00):    No growth to date.    Culture - Blood (collected 06-08-19 @ 06:05)  Source: .Blood None  Gram Stain (06-09-19 @ 11:40):    Growth in aerobic bottle: Gram Positive Cocci in Pairs and Chains    Growth in anaerobic bottle: Gram Positive Cocci in Pairs and Chains  Final Report (06-10-19 @ 14:54):    Growth in aerobic and anaerobic bottles: Enterococcus faecalis    See previous culture [id number]    Culture - Blood (collected 06-07-19 @ 22:45)  Source: .Blood Blood  Gram Stain (06-08-19 @ 14:23):    Growth in aerobic and anaerobic bottles: Gram Positive Cocci in Pairs and    Chains  Final Report (06-10-19 @ 14:44):    Growth in aerobic and anaerobic bottles: Enterococcus faecalis    See previous culture [id number]    Culture - Blood (collected 06-07-19 @ 22:45)  Source: .Blood Blood  Gram Stain (06-08-19 @ 14:29):    Growth in aerobic bottle: Gram Positive Cocci in Pairs and Chains    Growth in anaerobic bottle: Gram Positive Cocci in Pairs and Chains  Final Report (06-10-19 @ 14:01):    Growth in aerobic and anaerobic bottles: Enterococcus faecalis    "Due to technical problems, Proteus sp. will Not be reported as part of    the BCID panel until further notice"    ***Blood Panel PCR results on this specimen are available    approximately 3 hours after the Gram stain result.***    Gram stain, PCR, and/or culture results may not always    correspond due to difference in methodologies.    ************************************************************    This PCR assay was performed using [employer].    The following targets are tested for: Enterococcus,    vancomycin resistant enterococci, Listeria monocytogenes,    coagulase negative staphylococci, S. aureus,    methicillin resistant S. aureus, Streptococcus agalactiae    (Group B), S. pneumoniae, S. pyogenes (Group A),    Acinetobacter baumannii, Enterobacter cloacae, E. coli,    Klebsiella oxytoca, K. pneumoniae, Proteus sp.,    Serratia marcescens, Haemophilus influenzae,    Neisseria meningitidis, Pseudomonas aeruginosa, Candida    albicans, C. glabrata, C krusei, C parapsilosis,    C. tropicalis and the KPC resistance gene.  Organism: Blood Culture PCR  Enterococcus faecalis (06-10-19 @ 14:01)  Organism: Enterococcus faecalis (06-10-19 @ 14:01)      -  Ampicillin: S <=2 Predicts results to ampicillin/sulbactam, amoxacillin-clavulanate and  piperacillin-tazobactam.      -  Gentamicin synergy: S      -  Vancomycin: S 2      Method Type: BRIANDA  Organism: Blood Culture PCR (06-10-19 @ 14:01)      -  Enterococcus species: Detec      Method Type: PCR        Blood Gas Venous - Lactate: 1.4 mmoL/L (06-07-19 @ 22:53)  Lactate, Blood: 1.6 mmol/L (06-07-19 @ 22:45)      INFECTIOUS DISEASES TESTING      RADIOLOGY & ADDITIONAL TESTS:  I have personally reviewed the last Chest xray  CXR      CT      CARDIOLOGY TESTING  Transthoracic Echocardiogram:    EXAM:  2-D ECHO (TTE) COMPLETE        PROCEDURE DATE:  06/08/2019      INTERPRETATION:  REPORT:    TRANSTHORACIC ECHOCARDIOGRAM REPORT         Patient Name:   RODRICK IQBAL Accession #: [id number]  Medical Rec #:  [id number]        Height:      71.0in 180.3 cm  YOB: 1950       Weight:      325.0 lb 147.42 kg  Patient Age:    68 years        BSA:         2.59 m²  Patient Gender: M               BP:          112/75 mmHg       Date of Exam:        6/8/2019 5:12:10 PM  Referring Physician: [id number] MORIN  Sonographer:         Chastity Joshi  Reading Physician:   Fortino Macias MD.    Procedure:     2D Echo/Doppler/Color Doppler Complete.  Indications:   I33.0 - Infective Endocarditis  Diagnosis:     I33.0 - Infective Endocarditis  Study Details: Technically difficult study. Study quality was adversely   affected                 due to body habitus and patient obesity.         Summary:   1. Normal global left ventricular systolic function.   2. LV Ejection Fractionby Keith's Method with a biplane EF of 64 %.   3. Elevated left atrial and left ventricular end-diastolic pressures.   4. Mild concentric left ventricular hypertrophy.   5. Mildly increased LV wall thickness.   6. Normal left ventricular internal cavity size.   7. Spectral Doppler shows pseudonormal pattern of left ventricular   myocardial filling (Grade II diastolic dysfunction).   8. 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   9. LA volume Index is 35.4 ml/m² ml/m2.    PHYSICIAN INTERPRETATION:  Left Ventricle: Normal left ventricular size and wall thicknesses, with   normal systolic and diastolic function. The left ventricular internal   cavity size is normal. Left ventricular wall thickness is mildly   increased. There is mild concentric left ventricular hypertrophy. Global   LV systolic function was normal. Spectral Doppler shows pseudonormal   pattern of left ventricular myocardial filling (Grade II diastolic   dysfunction). Elevated left atrial and left ventricular end-diastolic   pressures.  Right Ventricle: Normal right ventricular size and function.  Left Atrium: Mildly enlarged left atrium. LA volume Index is 35.4 ml/m²   ml/m2.  Right Atrium: Normal right atrial size.  Pericardium: There is no evidence of pericardial effusion.  Mitral Valve: Structurally normal mitral valve, with normal leaflet   excursion. The mitral valve is normal in structure. Mild mitral valve   regurgitation is seen.  Tricuspid Valve: Structurally normal tricuspid valve, with normal leaflet   excursion. The tricuspid valve is normal in structure. Mild tricuspid   regurgitation is visualized.  Aortic Valve: The aortic valve is normal. Echo findings are consistent   with normal structure and function of the aortic prosthesis. No evidence   of aortic valve regurgitation is seen.  Pulmonic Valve: Structurally normal pulmonic valve, with normal leaflet   excursion. The pulmonic valve is normal. No indication of pulmonic valve   regurgitation.  Aorta: The aortic root and ascending aorta are structurally normal, with   no evidence of dilitation.  Pulmonary Artery: The main pulmonary artery is normal in size.  Venous: The inferior vena cava was normal sized, with respiratory size   variation less than 50%.       2D AND M-MODE MEASUREMENTS (normal ranges within parentheses):  Left                  Normal   Aorta/Left             Normal  Ventricle:                     Atrium:  IVSd        1.48 cm  (0.7-1.1) AoV Cusp       1.68  (1.5-2.6)  (Mmode):                       Separation:     cm  LVPWd       1.15 cm  (0.7-1.1) Left Atrium    4.61  (1.9-4.0)  (Mmode):                       (Mmode):        cm  LVIDd       5.49 cm  (3.4-5.7) LA Volume      35.3  (Mmode):                       Index         ml/m²  LVIDs       3.91 cm            Right  (Mmode):                       Ventricle:  LV FS        28.7 %   (>25%)   TAPSE:         1.50 cm  (Mmode):  Rel. Wall     0.42    (<0.42)  Thickness  Mm  LV Mass      118.6  Index:        g/m²  Mmode    SPECTRAL DOPPLER ANALYSIS:  LV DIASTOLIC FUNCTION:  MV Peak E: 1.13 m/s Decel Time: 279 msec  MV Peak A: 0.74 m/s  E/A Ratio: 1.53    Aortic Valve:  AoV VMax:    1.58 m/s  AoV VTI:     0.31 m  AoV Pk Grad: 9.9 mmHg  AoV Mn Grad: 5.8 mmHg    LVOT Vmax: 1.15 m/s  LVOT VTI:  0.22 m    Mitral Valve:  MV P1/2 Time: 80.98 msec  MV Area, PHT: 2.72 cm²    Tricuspid Valve and PA/RV Systolic Pressure: TR Max Velocity: 2.34 m/s RA   Pressure: 8 mmHg RVSP/PASP: 29.9 mmHg       [id number] Fortino Macias MD, Electronically signed on 6/8/2019 at 10:20:23   PM         *** Final ***                    FORTINO MACIAS MD  This document has been electronically signed. Jun 8 2019  5:12PM             (06-08-19 @ 17:12)  12 Lead ECG:   Ventricular Rate 82 BPM    Atrial Rate 82 BPM    P-R Interval 130 ms    QRS Duration 136 ms    Q-T Interval 450 ms    QTC Calculation(Bezet) 525 ms    P Axis 87 degrees    R Axis -87 degrees    T Axis 59 degrees    Diagnosis Line Sinus rhythm  Ventricular pacing  Abnormal ECG    Confirmed by FORTINO MACIAS MD ([id number]) on 6/8/2019 6:14:48 AM (06-07-19 @ 23:28)      MEDICATIONS  ampicillin  IVPB   ampicillin  IVPB 2  apixaban 5  aspirin enteric coated 81  atorvastatin 40  cefTRIAXone   IVPB 2  metoprolol tartrate 25  simethicone 80  tamsulosin 0.8      ANTIBIOTICS:  ampicillin  IVPB      ampicillin  IVPB 2 Gram(s) IV Intermittent every 4 hours  cefTRIAXone   IVPB 2 Gram(s) IV Intermittent every 12 hours
RODRICK IQBAL  68y, Male  Allergy: No Known Allergies      CHIEF COMPLAINT: Fever, weakness and dyspnea on exertion (11 Jun 2019 14:53)      INTERVAL EVENTS/HPI  - No acute events overnight  - gallium with uptake in sternum  - T(F): , Max: 98.2 (06-11-19 @ 21:08)  - Denies any worsening symptoms  - Tolerating medication  - WBC Count: 7.23 K/uL (06-12-19 @ 07:00)      ROS  Negative except as per noted above in HPI  Denies cough  Denies diarrhea  Denies dysuria   Denies pain at any IV site     FH noncontributory    VITALS:  T(F): 98, Max: 98.2 (06-11-19 @ 21:08)  HR: 68  BP: 127/69  RR: 20Vital Signs Last 24 Hrs  T(C): 36.7 (12 Jun 2019 05:36), Max: 36.8 (11 Jun 2019 21:08)  T(F): 98 (12 Jun 2019 05:36), Max: 98.2 (11 Jun 2019 21:08)  HR: 68 (12 Jun 2019 05:36) (68 - 70)  BP: 127/69 (12 Jun 2019 05:36) (127/69 - 150/72)  BP(mean): --  RR: 20 (12 Jun 2019 05:36) (20 - 20)  SpO2: 96% (12 Jun 2019 05:36) (95% - 96%)    PHYSICAL EXAM:  Gen: NAD, resting in bed  HEENT: Normocephalic, atraumatic  Neck: supple, no lymphadenopathy  CV: Regular rate & regular rhythm, SHA, R chest PM clean, no sternal ttp  Lungs: decreased BS at bases, no fremitus  Abdomen: Soft, BS present  Ext: Warm, well perfused  Rectal: no ext hemorrhoids  Neuro: non focal, awake  Skin: no rash, no erythema  Lines: no phlebitis      TESTS & MEASUREMENTS:                        12.9   7.23  )-----------( 154      ( 12 Jun 2019 07:00 )             38.2     06-12    143  |  108  |  10  ----------------------------<  116<H>  4.0   |  23  |  0.8    Ca    8.5      12 Jun 2019 07:00  Mg     1.9     06-12      eGFR if Non African American: 91 mL/min/1.73M2 (06-12-19 @ 07:00)  eGFR if : 106 mL/min/1.73M2 (06-12-19 @ 07:00)          Culture - Blood (collected 06-10-19 @ 06:19)  Source: .Blood None  Preliminary Report (06-11-19 @ 13:01):    No growth to date.    Culture - Blood (collected 06-10-19 @ 06:19)  Source: .Blood None  Preliminary Report (06-11-19 @ 13:01):    No growth to date.    Culture - Blood (collected 06-09-19 @ 07:57)  Source: .Blood None  Preliminary Report (06-10-19 @ 17:01):    No growth to date.    Culture - Blood (collected 06-09-19 @ 07:57)  Source: .Blood None  Preliminary Report (06-10-19 @ 17:01):    No growth to date.    Culture - Blood (collected 06-09-19 @ 07:57)  Source: .Blood None  Preliminary Report (06-10-19 @ 17:00):    No growth to date.    Culture - Blood (collected 06-08-19 @ 06:05)  Source: .Blood None  Gram Stain (06-09-19 @ 11:40):    Growth in aerobic bottle: Gram Positive Cocci in Pairs and Chains    Growth in anaerobic bottle: Gram Positive Cocci in Pairs and Chains  Final Report (06-10-19 @ 14:54):    Growth in aerobic and anaerobic bottles: Enterococcus faecalis    See previous culture [id number]    Culture - Blood (collected 06-07-19 @ 22:45)  Source: .Blood Blood  Gram Stain (06-08-19 @ 14:23):    Growth in aerobic and anaerobic bottles: Gram Positive Cocci in Pairs and    Chains  Final Report (06-10-19 @ 14:44):    Growth in aerobic and anaerobic bottles: Enterococcus faecalis    See previous culture [id number]    Culture - Blood (collected 06-07-19 @ 22:45)  Source: .Blood Blood  Gram Stain (06-08-19 @ 14:29):    Growth in aerobic bottle: Gram Positive Cocci in Pairs and Chains    Growth in anaerobic bottle: Gram Positive Cocci in Pairs and Chains  Final Report (06-10-19 @ 14:01):    Growth in aerobic and anaerobic bottles: Enterococcus faecalis    "Due to technical problems, Proteus sp. will Not be reported as part of    the BCID panel until further notice"    ***Blood Panel PCR results on this specimen are available    approximately 3 hours after the Gram stain result.***    Gram stain, PCR, and/or culture results may not always    correspond due to difference in methodologies.    ************************************************************    This PCR assay was performed using [employer].    The following targets are tested for: Enterococcus,    vancomycin resistant enterococci, Listeria monocytogenes,    coagulase negative staphylococci, S. aureus,    methicillin resistant S. aureus, Streptococcus agalactiae    (Group B), S. pneumoniae, S. pyogenes (Group A),    Acinetobacter baumannii, Enterobacter cloacae, E. coli,    Klebsiella oxytoca, K. pneumoniae, Proteus sp.,    Serratia marcescens, Haemophilus influenzae,    Neisseria meningitidis, Pseudomonas aeruginosa, Candida    albicans, C. glabrata, C krusei, C parapsilosis,    C. tropicalis and the KPC resistance gene.  Organism: Blood Culture PCR  Enterococcus faecalis (06-10-19 @ 14:01)  Organism: Enterococcus faecalis (06-10-19 @ 14:01)      -  Ampicillin: S <=2 Predicts results to ampicillin/sulbactam, amoxacillin-clavulanate and  piperacillin-tazobactam.      -  Gentamicin synergy: S      -  Vancomycin: S 2      Method Type: BRIANDA  Organism: Blood Culture PCR (06-10-19 @ 14:01)      -  Enterococcus species: Detec      Method Type: PCR        Blood Gas Venous - Lactate: 1.4 mmoL/L (06-07-19 @ 22:53)  Lactate, Blood: 1.6 mmol/L (06-07-19 @ 22:45)      INFECTIOUS DISEASES TESTING      RADIOLOGY & ADDITIONAL TESTS:  I have personally reviewed the last Chest xray  CXR      CT      CARDIOLOGY TESTING  Transthoracic Echocardiogram:    EXAM:  2-D ECHO (TTE) COMPLETE        PROCEDURE DATE:  06/08/2019      INTERPRETATION:  REPORT:    TRANSTHORACIC ECHOCARDIOGRAM REPORT         Patient Name:   RODRICK IQBAL Accession #: [id number]  Medical Rec #:  [id number]        Height:      71.0in 180.3 cm  YOB: 1950       Weight:      325.0 lb 147.42 kg  Patient Age:    68 years        BSA:         2.59 m²  Patient Gender: M               BP:          112/75 mmHg       Date of Exam:        6/8/2019 5:12:10 PM  Referring Physician: [id number] MAC MORIN  Sonographer:         Chastity Joshi  Reading Physician:   Fortino Macias MD.    Procedure:     2D Echo/Doppler/Color Doppler Complete.  Indications:   I33.0 - Infective Endocarditis  Diagnosis:     I33.0 - Infective Endocarditis  Study Details: Technically difficult study. Study quality was adversely   affected                 due to body habitus and patient obesity.         Summary:   1. Normal global left ventricular systolic function.   2. LV Ejection Fractionby Keith's Method with a biplane EF of 64 %.   3. Elevated left atrial and left ventricular end-diastolic pressures.   4. Mild concentric left ventricular hypertrophy.   5. Mildly increased LV wall thickness.   6. Normal left ventricular internal cavity size.   7. Spectral Doppler shows pseudonormal pattern of left ventricular   myocardial filling (Grade II diastolic dysfunction).   8. 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   9. LA volume Index is 35.4 ml/m² ml/m2.    PHYSICIAN INTERPRETATION:  Left Ventricle: Normal left ventricular size and wall thicknesses, with   normal systolic and diastolic function. The left ventricular internal   cavity size is normal. Left ventricular wall thickness is mildly   increased. There is mild concentric left ventricular hypertrophy. Global   LV systolic function was normal. Spectral Doppler shows pseudonormal   pattern of left ventricular myocardial filling (Grade II diastolic   dysfunction). Elevated left atrial and left ventricular end-diastolic   pressures.  Right Ventricle: Normal right ventricular size and function.  Left Atrium: Mildly enlarged left atrium. LA volume Index is 35.4 ml/m²   ml/m2.  Right Atrium: Normal right atrial size.  Pericardium: There is no evidence of pericardial effusion.  Mitral Valve: Structurally normal mitral valve, with normal leaflet   excursion. The mitral valve is normal in structure. Mild mitral valve   regurgitation is seen.  Tricuspid Valve: Structurally normal tricuspid valve, with normal leaflet   excursion. The tricuspid valve is normal in structure. Mild tricuspid   regurgitation is visualized.  Aortic Valve: The aortic valve is normal. Echo findings are consistent   with normal structure and function of the aortic prosthesis. No evidence   of aortic valve regurgitation is seen.  Pulmonic Valve: Structurally normal pulmonic valve, with normal leaflet   excursion. The pulmonic valve is normal. No indication of pulmonic valve   regurgitation.  Aorta: The aortic root and ascending aorta are structurally normal, with   no evidence of dilitation.  Pulmonary Artery: The main pulmonary artery is normal in size.  Venous: The inferior vena cava was normal sized, with respiratory size   variation less than 50%.       2D AND M-MODE MEASUREMENTS (normal ranges within parentheses):  Left                  Normal   Aorta/Left             Normal  Ventricle:                     Atrium:  IVSd        1.48 cm  (0.7-1.1) AoV Cusp       1.68  (1.5-2.6)  (Mmode):                       Separation:     cm  LVPWd       1.15 cm  (0.7-1.1) Left Atrium    4.61  (1.9-4.0)  (Mmode):                       (Mmode):        cm  LVIDd       5.49 cm  (3.4-5.7) LA Volume      35.3  (Mmode):                       Index         ml/m²  LVIDs       3.91 cm            Right  (Mmode):                       Ventricle:  LV FS        28.7 %   (>25%)   TAPSE:         1.50 cm  (Mmode):  Rel. Wall     0.42    (<0.42)  Thickness  Mm  LV Mass      118.6  Index:        g/m²  Mmode    SPECTRAL DOPPLER ANALYSIS:  LV DIASTOLIC FUNCTION:  MV Peak E: 1.13 m/s Decel Time: 279 msec  MV Peak A: 0.74 m/s  E/A Ratio: 1.53    Aortic Valve:  AoV VMax:    1.58 m/s  AoV VTI:     0.31 m  AoV Pk Grad: 9.9 mmHg  AoV Mn Grad: 5.8 mmHg    LVOT Vmax: 1.15 m/s  LVOT VTI:  0.22 m    Mitral Valve:  MV P1/2 Time: 80.98 msec  MV Area, PHT: 2.72 cm²    Tricuspid Valve and PA/RV Systolic Pressure: TR Max Velocity: 2.34 m/s RA   Pressure: 8 mmHg RVSP/PASP: 29.9 mmHg       [id number] Fortino Macias MD, Electronically signed on 6/8/2019 at 10:20:23   PM         *** Final ***                    FORTINO MACIAS MD  This document has been electronically signed. Jun 8 2019  5:12PM             (06-08-19 @ 17:12)  12 Lead ECG:   Ventricular Rate 82 BPM    Atrial Rate 82 BPM    P-R Interval 130 ms    QRS Duration 136 ms    Q-T Interval 450 ms    QTC Calculation(Bezet) 525 ms    P Axis 87 degrees    R Axis -87 degrees    T Axis 59 degrees    Diagnosis Line Sinus rhythm  Ventricular pacing  Abnormal ECG    Confirmed by FORTINO MACIAS MD ([id number]) on 6/8/2019 6:14:48 AM (06-07-19 @ 23:28)      MEDICATIONS  ampicillin  IVPB   ampicillin  IVPB 2  apixaban 5  aspirin enteric coated 81  atorvastatin 40  cefTRIAXone   IVPB 2  metoprolol tartrate 25  simethicone 80  tamsulosin 0.8      ANTIBIOTICS:  ampicillin  IVPB      ampicillin  IVPB 2 Gram(s) IV Intermittent every 4 hours  cefTRIAXone   IVPB 2 Gram(s) IV Intermittent every 12 hours
RODRICK IQBAL  68y, Male  Allergy: No Known Allergies      CHIEF COMPLAINT: Fever, weakness and dyspnea on exertion (13 Jun 2019 05:41)      INTERVAL EVENTS/HPI  - No acute events overnight  - CT Chest showing Fluid attenuation extends from posterior sternum to pericardium and abuts ascending aorta, corresponds to the finding of previous positive gallium scan.  - T(F): , Max: 99.8 (06-12-19 @ 21:30)  - Denies any worsening symptoms  - Tolerating medication  - WBC Count: 7.27 K/uL (06-13-19 @ 07:16)      ROS  Negative except as per noted above in HPI  Denies cough  Denies diarrhea  Denies dysuria   Denies pain at any IV site     FH noncontributory    VITALS:  T(F): 97.3, Max: 99.8 (06-12-19 @ 21:30)  HR: 70  BP: 116/61  RR: 18Vital Signs Last 24 Hrs  T(C): 36.3 (13 Jun 2019 05:35), Max: 37.7 (12 Jun 2019 21:30)  T(F): 97.3 (13 Jun 2019 05:35), Max: 99.8 (12 Jun 2019 21:30)  HR: 70 (13 Jun 2019 05:35) (69 - 70)  BP: 116/61 (13 Jun 2019 05:35) (116/61 - 139/84)  BP(mean): --  RR: 18 (13 Jun 2019 05:35) (18 - 18)  SpO2: --    PHYSICAL EXAM:  Gen: NAD, resting in bed  HEENT: Normocephalic, atraumatic  Neck: supple, no lymphadenopathy  CV: Regular rate & regular rhythm, SHA, R chest PM clean, no sternal ttp  Lungs: decreased BS at bases, no fremitus  Abdomen: Soft, BS present  Ext: Warm, well perfused  Rectal: no ext hemorrhoids  Neuro: non focal, awake  Skin: no rash, no erythema  Lines: no phlebitis        TESTS & MEASUREMENTS:                        13.1   7.27  )-----------( 195      ( 13 Jun 2019 07:16 )             38.9     06-13    140  |  104  |  11  ----------------------------<  106<H>  3.9   |  22  |  0.8    Ca    8.5      13 Jun 2019 07:16  Mg     1.9     06-12      eGFR if Non African American: 91 mL/min/1.73M2 (06-13-19 @ 07:16)  eGFR if : 106 mL/min/1.73M2 (06-13-19 @ 07:16)          Culture - Blood (collected 06-11-19 @ 07:56)  Source: .Blood None  Preliminary Report (06-12-19 @ 14:01):    No growth to date.    Culture - Blood (collected 06-10-19 @ 06:19)  Source: .Blood None  Preliminary Report (06-11-19 @ 13:01):    No growth to date.    Culture - Blood (collected 06-10-19 @ 06:19)  Source: .Blood None  Preliminary Report (06-11-19 @ 13:01):    No growth to date.    Culture - Blood (collected 06-09-19 @ 07:57)  Source: .Blood None  Preliminary Report (06-10-19 @ 17:01):    No growth to date.    Culture - Blood (collected 06-09-19 @ 07:57)  Source: .Blood None  Preliminary Report (06-10-19 @ 17:01):    No growth to date.    Culture - Blood (collected 06-09-19 @ 07:57)  Source: .Blood None  Preliminary Report (06-10-19 @ 17:00):    No growth to date.    Culture - Blood (collected 06-08-19 @ 06:05)  Source: .Blood None  Gram Stain (06-09-19 @ 11:40):    Growth in aerobic bottle: Gram Positive Cocci in Pairs and Chains    Growth in anaerobic bottle: Gram Positive Cocci in Pairs and Chains  Final Report (06-10-19 @ 14:54):    Growth in aerobic and anaerobic bottles: Enterococcus faecalis    See previous culture [id number]    Culture - Blood (collected 06-07-19 @ 22:45)  Source: .Blood Blood  Gram Stain (06-08-19 @ 14:23):    Growth in aerobic and anaerobic bottles: Gram Positive Cocci in Pairs and    Chains  Final Report (06-10-19 @ 14:44):    Growth in aerobic and anaerobic bottles: Enterococcus faecalis    See previous culture [id number]    Culture - Blood (collected 06-07-19 @ 22:45)  Source: .Blood Blood  Gram Stain (06-08-19 @ 14:29):    Growth in aerobic bottle: Gram Positive Cocci in Pairs and Chains    Growth in anaerobic bottle: Gram Positive Cocci in Pairs and Chains  Final Report (06-10-19 @ 14:01):    Growth in aerobic and anaerobic bottles: Enterococcus faecalis    "Due to technical problems, Proteus sp. will Not be reported as part of    the BCID panel until further notice"    ***Blood Panel PCR results on this specimen are available    approximately 3 hours after the Gram stain result.***    Gram stain, PCR, and/or culture results may not always    correspond due to difference in methodologies.    ************************************************************    This PCR assay was performed using [employer].    The following targets are tested for: Enterococcus,    vancomycin resistant enterococci, Listeria monocytogenes,    coagulase negative staphylococci, S. aureus,    methicillin resistant S. aureus, Streptococcus agalactiae    (Group B), S. pneumoniae, S. pyogenes (Group A),    Acinetobacter baumannii, Enterobacter cloacae, E. coli,    Klebsiella oxytoca, K. pneumoniae, Proteus sp.,    Serratia marcescens, Haemophilus influenzae,    Neisseria meningitidis, Pseudomonas aeruginosa, Candida    albicans, C. glabrata, C krusei, C parapsilosis,    C. tropicalis and the KPC resistance gene.  Organism: Blood Culture PCR  Enterococcus faecalis (06-10-19 @ 14:01)  Organism: Enterococcus faecalis (06-10-19 @ 14:01)      -  Ampicillin: S <=2 Predicts results to ampicillin/sulbactam, amoxacillin-clavulanate and  piperacillin-tazobactam.      -  Gentamicin synergy: S      -  Vancomycin: S 2      Method Type: BRIANDA  Organism: Blood Culture PCR (06-10-19 @ 14:01)      -  Enterococcus species: Detec      Method Type: PCR            INFECTIOUS DISEASES TESTING      RADIOLOGY & ADDITIONAL TESTS:  I have personally reviewed the last Chest xray  CXR      CT  CT Chest w/ IV Cont:   EXAM:  CT CHEST IC            PROCEDURE DATE:  06/12/2019            INTERPRETATION:  CLINICAL HISTORY / REASON FOR EXAM: Bacteremia, possible   endocarditis.    TECHNIQUE: Multislice helical sections were obtained from the thoracic   inlet to the lung bases without intravenous contrast. Coronal, sagittal   and 3D/MIP reformatted images are also submitted.    COMPARISON: CTA chest from November 27, 2018. Nuclear medicine gallium   scan from June 12, 2019.    FINDINGS:    Status post median sternotomy with ascending aortic replacement for   aneurysm. Replaced aorta measures 2.8 cm Post aortic valve replacement.   There is a coronary artery bypass surgery to the left main/proximal LAD.   Post right coronary artery reimplantation. Bovine aortic arch.    There is fluid attenuation which extends from the posterior sternum to   the pericardium and abuts the ascending aorta on image #117 of series 4,   corresponds to previous positive gallium scan.    TUBES/LINES: Pacing device within the subcutaneous tissues of the right   chest and leads terminating in the right atrium and right ventricle.    LUNGS, PLEURA, AND AIRWAYS: Right basilar pleural thickening. Small left   pleural effusion with atelectasis. Small bilateral pleural effusions   (left greater than right) with adjacent atelectasis. Central airways   patent.    MEDIASTINUM/LYMPH NODES: No mediastinal or axillary lymphadenopathy.    HEART/GREAT VESSELS: Cardiomegaly. Post aortic valve replacement as   mentioned above    BONES/SOFT TISSUES: Diffuse osteopenia. Degenerative changes of the   thoracic spine.    VISUALIZED UPPER ABDOMEN: Post cholecystectomy.      IMPRESSION:    1.  Post ascending aortic replacement for aortic aneurysm. Replaced aorta   measures 2.8 cm.  2.  Post aortic valve replacement.  3.  Status post coronary artery bypass surgery to left main/proximal LAD   and right coronary reimplantation.  4.  Fluid attenuation extends from posterior sternum to pericardium and   abuts ascending aorta, corresponds to the finding of previous positive   gallium scan.  5.  Small bilateral pleural effusion with atelectasis.              JOYCELYN THOMPSON M.D., RESIDENT RADIOLOGIST  This document has been electronically signed.  TAHIR CORADO M.D., ATTENDING RADIOLOGIST  Thisdocument has been electronically signed. Jun 13 2019  8:44AM             (06-12-19 @ 23:06)      CARDIOLOGY TESTING  Transthoracic Echocardiogram:    EXAM:  2-D ECHO (TTE) COMPLETE        PROCEDURE DATE:  06/08/2019      INTERPRETATION:  REPORT:    TRANSTHORACIC ECHOCARDIOGRAM REPORT         Patient Name:   RODRICK IQBAL Accession #: [id number]  Medical Rec #:  [id number]        Height:      71.0in 180.3 cm  YOB: 1950       Weight:      325.0 lb 147.42 kg  Patient Age:    68 years        BSA:         2.59 m²  Patient Gender: M               BP:          112/75 mmHg       Date of Exam:        6/8/2019 5:12:10 PM  Referring Physician: [id number] MAC MORIN  Sonographer:         Chastity Joshi  Reading Physician:   Fortino Stiles MD.    Procedure:     2D Echo/Doppler/Color Doppler Complete.  Indications:   I33.0 - Infective Endocarditis  Diagnosis:     I33.0 - Infective Endocarditis  Study Details: Technically difficult study. Study quality was adversely   affected                 due to body habitus and patient obesity.         Summary:   1. Normal global left ventricular systolic function.   2. LV Ejection Fractionby Keith's Method with a biplane EF of 64 %.   3. Elevated left atrial and left ventricular end-diastolic pressures.   4. Mild concentric left ventricular hypertrophy.   5. Mildly increased LV wall thickness.   6. Normal left ventricular internal cavity size.   7. Spectral Doppler shows pseudonormal pattern of left ventricular   myocardial filling (Grade II diastolic dysfunction).   8. 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   9. LA volume Index is 35.4 ml/m² ml/m2.    PHYSICIAN INTERPRETATION:  Left Ventricle: Normal left ventricular size and wall thicknesses, with   normal systolic and diastolic function. The left ventricular internal   cavity size is normal. Left ventricular wall thickness is mildly   increased. There is mild concentric left ventricular hypertrophy. Global   LV systolic function was normal. Spectral Doppler shows pseudonormal   pattern of left ventricular myocardial filling (Grade II diastolic   dysfunction). Elevated left atrial and left ventricular end-diastolic   pressures.  Right Ventricle: Normal right ventricular size and function.  Left Atrium: Mildly enlarged left atrium. LA volume Index is 35.4 ml/m²   ml/m2.  Right Atrium: Normal right atrial size.  Pericardium: There is no evidence of pericardial effusion.  Mitral Valve: Structurally normal mitral valve, with normal leaflet   excursion. The mitral valve is normal in structure. Mild mitral valve   regurgitation is seen.  Tricuspid Valve: Structurally normal tricuspid valve, with normal leaflet   excursion. The tricuspid valve is normal in structure. Mild tricuspid   regurgitation is visualized.  Aortic Valve: The aortic valve is normal. Echo findings are consistent   with normal structure and function of the aortic prosthesis. No evidence   of aortic valve regurgitation is seen.  Pulmonic Valve: Structurally normal pulmonic valve, with normal leaflet   excursion. The pulmonic valve is normal. No indication of pulmonic valve   regurgitation.  Aorta: The aortic root and ascending aorta are structurally normal, with   no evidence of dilitation.  Pulmonary Artery: The main pulmonary artery is normal in size.  Venous: The inferior vena cava was normal sized, with respiratory size   variation less than 50%.       2D AND M-MODE MEASUREMENTS (normal ranges within parentheses):  Left                  Normal   Aorta/Left             Normal  Ventricle:                     Atrium:  IVSd        1.48 cm  (0.7-1.1) AoV Cusp       1.68  (1.5-2.6)  (Mmode):                       Separation:     cm  LVPWd       1.15 cm  (0.7-1.1) Left Atrium    4.61  (1.9-4.0)  (Mmode):                       (Mmode):        cm  LVIDd       5.49 cm  (3.4-5.7) LA Volume      35.3  (Mmode):                       Index         ml/m²  LVIDs       3.91 cm            Right  (Mmode):                       Ventricle:  LV FS        28.7 %   (>25%)   TAPSE:         1.50 cm  (Mmode):  Rel. Wall     0.42    (<0.42)  Thickness  Mm  LV Mass      118.6  Index:        g/m²  Mmode    SPECTRAL DOPPLER ANALYSIS:  LV DIASTOLIC FUNCTION:  MV Peak E: 1.13 m/s Decel Time: 279 msec  MV Peak A: 0.74 m/s  E/A Ratio: 1.53    Aortic Valve:  AoV VMax:    1.58 m/s  AoV VTI:     0.31 m  AoV Pk Grad: 9.9 mmHg  AoV Mn Grad: 5.8 mmHg    LVOT Vmax: 1.15 m/s  LVOT VTI:  0.22 m    Mitral Valve:  MV P1/2 Time: 80.98 msec  MV Area, PHT: 2.72 cm²    Tricuspid Valve and PA/RV Systolic Pressure: TR Max Velocity: 2.34 m/s RA   Pressure: 8 mmHg RVSP/PASP: 29.9 mmHg       [id number] Fortino Stiles MD, Electronically signed on 6/8/2019 at 10:20:23   PM         *** Final ***                    FORTINO STILES MD  This document has been electronically signed. Jun 8 2019  5:12PM             (06-08-19 @ 17:12)  12 Lead ECG:   Ventricular Rate 82 BPM    Atrial Rate 82 BPM    P-R Interval 130 ms    QRS Duration 136 ms    Q-T Interval 450 ms    QTC Calculation(Bezet) 525 ms    P Axis 87 degrees    R Axis -87 degrees    T Axis 59 degrees    Diagnosis Line Sinus rhythm  Ventricular pacing  Abnormal ECG    Confirmed by FORTINO STILES MD ([id number]) on 6/8/2019 6:14:48 AM (06-07-19 @ 23:28)      MEDICATIONS  ALBUTerol/ipratropium for Nebulization 3  ampicillin  IVPB   ampicillin  IVPB 2  apixaban 5  aspirin enteric coated 81  atorvastatin 40  metoprolol tartrate 25  simethicone 80  tamsulosin 0.8      ANTIBIOTICS:  ampicillin  IVPB      ampicillin  IVPB 2 Gram(s) IV Intermittent every 4 hours
Patient was seen and examined. Spoke with RN. Chart reviewed.  for gallium scan  discussed with family at bedside  No events overnight.  Vital Signs Last 24 Hrs  T(F): 97.9 (11 Jun 2019 05:22), Max: 97.9 (10 Ward 2019 21:44)  HR: 70 (11 Jun 2019 05:22) (68 - 70)  BP: 121/59 (11 Jun 2019 05:22) (121/59 - 157/87)  SpO2: 98% (10 Ward 2019 17:00) (98% - 98%)  MEDICATIONS  (STANDING):  ampicillin  IVPB      ampicillin  IVPB 2 Gram(s) IV Intermittent every 4 hours  apixaban 5 milliGRAM(s) Oral every 12 hours  aspirin enteric coated 81 milliGRAM(s) Oral daily  atorvastatin 40 milliGRAM(s) Oral at bedtime  cefTRIAXone   IVPB 2 Gram(s) IV Intermittent every 12 hours  metoprolol tartrate 25 milliGRAM(s) Oral two times a day  simethicone 80 milliGRAM(s) Chew every 8 hours  tamsulosin 0.8 milliGRAM(s) Oral at bedtime    MEDICATIONS  (PRN):  acetaminophen   Tablet .. 650 milliGRAM(s) Oral every 6 hours PRN Temp greater or equal to 38C (100.4F)  ibuprofen  Tablet. 400 milliGRAM(s) Oral every 6 hours PRN Mild Pain (1 - 3)    Labs:                        12.7   6.79  )-----------( 145      ( 11 Jun 2019 07:56 )             37.1                         13.6   7.84  )-----------( 122      ( 10 Ward 2019 06:19 )             40.4     11 Jun 2019 07:56    140    |  106    |  10     ----------------------------<  155    3.9     |  23     |  0.9    10 Ward 2019 06:19    142    |  108    |  9      ----------------------------<  92     4.4     |  23     |  0.8      Ca    8.2        11 Jun 2019 07:56  Ca    8.4        10 Ward 2019 06:19  Mg     2.0       11 Jun 2019 07:56  Mg     2.0       10 Ward 2019 06:19      PT/INR - ( 11 Jun 2019 07:56 )   PT: 16.90 sec;   INR: 1.48 ratio         PTT - ( 11 Jun 2019 07:56 )  PTT:38.3 sec      Culture - Blood (collected 10 Ward 2019 06:19)  Source: .Blood None  Preliminary Report (11 Jun 2019 13:01):    No growth to date.    Culture - Blood (collected 10 Ward 2019 06:19)  Source: .Blood None  Preliminary Report (11 Jun 2019 13:01):    No growth to date.    Culture - Blood (collected 09 Jun 2019 07:57)  Source: .Blood None  Preliminary Report (10 Ward 2019 17:01):    No growth to date.    Culture - Blood (collected 09 Jun 2019 07:57)  Source: .Blood None  Preliminary Report (10 Ward 2019 17:01):    No growth to date.    Culture - Blood (collected 09 Jun 2019 07:57)  Source: .Blood None  Preliminary Report (10 Ward 2019 17:00):    No growth to date.        Radiology:    General: comfortable, NAD  Neurology: A&Ox3, nonfocal  Head:  Normocephalic, atraumatic  ENT:  Mucosa moist, no ulcerations  Neck:  Supple, no JVD,   Skin: no breakdowns (as per RN)  Resp: CTA B/L  CV: RRR, S1S2,   GI: Soft, NT, bowel sounds  MS: No edema, + peripheral pulses, FROM all 4 extremity

## 2019-06-14 NOTE — PROGRESS NOTE ADULT - ASSESSMENT
RODRICK IQBAL 68y Male  MRN#: [id number]   CODE STATUS: Full code      SUBJECTIVE  Patient is a 68y old Male who presented with a chief complaint of fever, weakness and dyspnea on exertion  Currently admitted to medicine with the primary diagnosis of sepsis secondary to bacteremia, IE ruled out  Today is hospital day 7d, and this morning he is resting in bed and reports no overnight events. Patient states that he sleep fairly well overnight. Denies fever/chills, chest pain, shortness of breath, abdominal pain, urinary symptoms.      OBJECTIVE  PAST MEDICAL & SURGICAL HISTORY  Infective endocarditis  History of BPH  Dyslipidemia  Hypertension  H/O: osteoarthritis  Atrial fibrillation  S/P AVR (aortic valve replacement): complicated by aortic aneurysm and infective endocarditis    ALLERGIES:  No Known Allergies      HOME MEDICATIONS:  HOME MEDICATIONS:  ampicillin 2 g injection: 2 gram(s) injectable every 4 hours (ends on 7/21/19) (13 Jun 2019 06:02)  aspirin 81 mg oral tablet: 1 tab(s) orally once a day (08 Jun 2019 02:42)  Crestor 10 mg oral tablet: 1 tab(s) orally once a day (08 Jun 2019 02:41)  Eliquis 5 mg oral tablet: 1 tab(s) orally 2 times a day (08 Jun 2019 02:41)  fenofibrate 134 mg oral capsule: 1 cap(s) orally once a day (08 Jun 2019 02:41)  metoprolol succinate 25 mg oral tablet, extended release: 1 tab(s) orally 2 times a day (08 Jun 2019 02:41)  Uroxatral 10 mg oral tablet, extended release: 1 tab(s) orally once a day (08 Jun 2019 02:42)      MEDICATIONS:  STANDING MEDICATIONS  ALBUTerol/ipratropium for Nebulization 3 milliLiter(s) Nebulizer every 6 hours  ampicillin  IVPB      ampicillin  IVPB 2 Gram(s) IV Intermittent every 4 hours  apixaban 5 milliGRAM(s) Oral every 12 hours  aspirin enteric coated 81 milliGRAM(s) Oral daily  atorvastatin 40 milliGRAM(s) Oral at bedtime  metoprolol tartrate 25 milliGRAM(s) Oral two times a day  simethicone 80 milliGRAM(s) Chew every 8 hours  tamsulosin 0.8 milliGRAM(s) Oral at bedtime    PRN MEDICATIONS  acetaminophen   Tablet .. 650 milliGRAM(s) Oral every 6 hours PRN  ibuprofen  Tablet. 400 milliGRAM(s) Oral every 6 hours PRN      VITAL SIGNS: Last 24 Hours  T(C): 36.1 (14 Jun 2019 05:41), Max: 36.7 (13 Jun 2019 21:35)  T(F): 97 (14 Jun 2019 05:41), Max: 98.1 (13 Jun 2019 21:35)  HR: 70 (14 Jun 2019 05:41) (70 - 74)  BP: 112/62 (14 Jun 2019 05:41) (112/62 - 151/80)  BP(mean): --  RR: 20 (14 Jun 2019 05:41) (18 - 20)  SpO2: 94% (14 Jun 2019 05:41) (94% - 96%)    LABS:                        13.1   7.27  )-----------( 195      ( 13 Jun 2019 07:16 )             38.9     06-13    140  |  104  |  11  ----------------------------<  106<H>  3.9   |  22  |  0.8    Ca    8.5      13 Jun 2019 07:16      RADIOLOGY:    < from: CT Chest w/ IV Cont (06.12.19 @ 23:06) >  1.  Post ascending aortic replacement for aortic aneurysm. Replaced aorta   measures 2.8 cm.  2.  Post aortic valve replacement.  3.  Status post coronary artery bypass surgery to left main/proximal LAD   and right coronary reimplantation.  4.  Fluid attenuation extends from posterior sternum to pericardium and   abuts ascending aorta, corresponds to the finding of previous positive   gallium scan.  5.  Small bilateral pleural effusion with atelectasis.    < end of copied text >      < from: NM SPECT Inflammation Imaging Loc (06.12.19 @ 17:13) >  Persistent 4+ intensity abnormal gallium uptake focally in the anterior   lower chest/upper abdomen in the region of the xiphoid or inferoposterior   to the xiphoid suspicious for focal site of inflammation in light of the   history. CT of the chest with attention to the anterior lower chest/upper   abdomen at the level of the xiphoid/midline suggested    No other definite sites of abnormal gallium uptake    < end of copied text >    < from: Xray Chest 1 View-PORTABLE IMMEDIATE (06.07.19 @ 23:45) >  1. Linear subsegmental atelectasis/scarring at the left base.  2. Otherwise, no radiographic evidence of acute cardiopulmonary disease.    < end of copied text >    < from: Transthoracic Echocardiogram (06.08.19 @ 17:12) >   1. Normal global left ventricular systolic function.   2. LV Ejection Fractionby Keith's Method with a biplane EF of 64 %.   3. Elevated left atrial and left ventricular end-diastolic pressures.   4. Mild concentric left ventricular hypertrophy.   5. Mildly increased LV wall thickness.   6. Normal left ventricular internal cavity size.   7. Spectral Doppler shows pseudonormal pattern of left ventricular   myocardial filling (Grade II diastolic dysfunction).   8. 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   9. LA volume Index is 35.4 ml/m² ml/m2.    < end of copied text >      PHYSICAL EXAM:    GENERAL: Mildly lethargic, well-developed, AAOx3  HEENT:  Atraumatic, Normocephalic. Conjunctiva pink and cornea white, No JVD  PULMONARY: Clear to auscultation bilaterally; No wheeze  CARDIOVASCULAR: Regular rate and rhythm; Loud S1S2 with grade 2/6 systolic murmurs best heard at RUSB  GASTROINTESTINAL: Obese abdomen, soft, nontender, nondistended; Bowel sounds present  EXTREMITIES:  2+ Peripheral Pulses, No petal edema; no splinter hemorrhage, no tender nodules on palms  NEUROLOGY: non-focal  SKIN: No rashes or lesions      ADMISSION SUMMARY  Patient is a 68y old Male who presented with a chief complaint of fever, weakness and dyspnea on exertion  Currently admitted to medicine with the primary diagnosis of sepsis secondary to bacteremia, IE ruled out    ASSESSMENT & PLAN  68M with PMHx of HTN, Afib on Eliquis, SAVR (complicated by aortic aneurysm and infective endocarditis in 2011 and 2012) s/p PPM on daily doxycycline for ppx presents for weakness and fever which began the morning of presentation.      # Sepsis secondary to enterococcus bacteremia - resolved  - U/A negative, CXR negative, no skin or soft tissue infections; patient has been on daily doxy 100mg since 2012 suggested by Cardiologist (Dr. Foster)  - BCx 6/7 shows enterococcus (sensitive to Amp, Vanco, and gent); BCx 6/8 shows G+C in pairs and chains, BCx on 6/9 and 6/10 NGTD  - TTE 6/8 shows EF 64%, G1DD, no valvular vegetation  - MELVIN 6/10 shows EF 60-65% no vegetation, normal functioning bioprosthetic aortic valve, normal mitral and tricuspid valve, clear PM wires, 3 wires detected, no mass or thrombus in the chamber  - 48 hrs Gallium scan shows persistent abnormal gallium uptake in the anterior midline, in region of left hepatic lobe dome/xiphoid process, CT chest with IV contrast shows fluid attenuation behind sternum, consistent with gallium uptake  - Continue ampicillin 2g Q4hrs and Ceftriaxone 2g Q12hrs  - ID recs appreciated (Dr. Avalos): CTS consult for possible drainage of fluid. Will need likely 6 weeks of IV abx, can hold doxy for now while on IV abx  - Cardio recs appreciated (Dr. Foster): will likely need 4 weeks of antibiotics, but follow up with ID  - Need weekly CBC, CMP and weekly follow up with ID outpatient: 6/25/19 11:25am [address]  - Case discussed with Brian Donnelly and Bailey, no urgent intervention is needed, can be discharged home tomorrow to complete course of antibiotics. Patient will need to follow up with his own CT surgeon at [facility].   - Discharge home today    # Chronic AFib with PPM  - CHADsVASc 2 (Age, HTN)  - Currently at paced rhythm on EKG  - Continue BB and Eliquis  - PPM interrogation no events    # DLD  - Continue Lipitor 40mg and ASA    # HTN  - Continue metoprolol    # BPH  - Takes alfuzosin 10mg at home, will give Flomax 0.8mg QHS       DVT ppx: Eliquis  GI ppx: Not indicated  Diet: DASH  Activity: ambulate as tolerated  Lines: Peripheral IVs  Code status: Full code  Dispo: d/c today

## 2019-06-14 NOTE — PROGRESS NOTE ADULT - PROVIDER SPECIALTY LIST ADULT
Infectious Disease
Internal Medicine

## 2019-06-14 NOTE — PROGRESS NOTE ADULT - ASSESSMENT
enteroccal bacteremia/ sepsis  prothetic av valve  chronic a fib  dyslip  htn  bph  fluid behind sternum    continue iv abx  dc home fu with ct surg [place]

## 2019-06-15 LAB
CULTURE RESULTS: SIGNIFICANT CHANGE UP
CULTURE RESULTS: SIGNIFICANT CHANGE UP
SPECIMEN SOURCE: SIGNIFICANT CHANGE UP
SPECIMEN SOURCE: SIGNIFICANT CHANGE UP

## 2019-06-16 LAB
CULTURE RESULTS: SIGNIFICANT CHANGE UP
SPECIMEN SOURCE: SIGNIFICANT CHANGE UP

## 2019-06-18 DIAGNOSIS — Z95.1 PRESENCE OF AORTOCORONARY BYPASS GRAFT: ICD-10-CM

## 2019-06-18 DIAGNOSIS — A41.9 SEPSIS, UNSPECIFIED ORGANISM: ICD-10-CM

## 2019-06-18 DIAGNOSIS — Z79.82 LONG TERM (CURRENT) USE OF ASPIRIN: ICD-10-CM

## 2019-06-18 DIAGNOSIS — N40.0 BENIGN PROSTATIC HYPERPLASIA WITHOUT LOWER URINARY TRACT SYMPTOMS: ICD-10-CM

## 2019-06-18 DIAGNOSIS — I71.2 THORACIC AORTIC ANEURYSM, WITHOUT RUPTURE: ICD-10-CM

## 2019-06-18 DIAGNOSIS — Z95.0 PRESENCE OF CARDIAC PACEMAKER: ICD-10-CM

## 2019-06-18 DIAGNOSIS — Z79.01 LONG TERM (CURRENT) USE OF ANTICOAGULANTS: ICD-10-CM

## 2019-06-18 DIAGNOSIS — Z87.891 PERSONAL HISTORY OF NICOTINE DEPENDENCE: ICD-10-CM

## 2019-06-18 DIAGNOSIS — I10 ESSENTIAL (PRIMARY) HYPERTENSION: ICD-10-CM

## 2019-06-18 DIAGNOSIS — A41.81 SEPSIS DUE TO ENTEROCOCCUS: ICD-10-CM

## 2019-06-18 DIAGNOSIS — I08.1 RHEUMATIC DISORDERS OF BOTH MITRAL AND TRICUSPID VALVES: ICD-10-CM

## 2019-06-18 DIAGNOSIS — M19.90 UNSPECIFIED OSTEOARTHRITIS, UNSPECIFIED SITE: ICD-10-CM

## 2019-06-18 DIAGNOSIS — Z95.2 PRESENCE OF PROSTHETIC HEART VALVE: ICD-10-CM

## 2019-06-18 DIAGNOSIS — M94.0 CHONDROCOSTAL JUNCTION SYNDROME [TIETZE]: ICD-10-CM

## 2019-06-18 DIAGNOSIS — I48.2 CHRONIC ATRIAL FIBRILLATION: ICD-10-CM

## 2019-06-18 DIAGNOSIS — E78.5 HYPERLIPIDEMIA, UNSPECIFIED: ICD-10-CM

## 2019-07-10 PROBLEM — E78.5 HYPERLIPIDEMIA, UNSPECIFIED: Chronic | Status: ACTIVE | Noted: 2019-06-08

## 2019-07-10 PROBLEM — I33.0 ACUTE AND SUBACUTE INFECTIVE ENDOCARDITIS: Chronic | Status: ACTIVE | Noted: 2019-06-08

## 2019-07-10 PROBLEM — I10 ESSENTIAL (PRIMARY) HYPERTENSION: Chronic | Status: ACTIVE | Noted: 2019-06-08

## 2019-07-10 PROBLEM — I48.91 UNSPECIFIED ATRIAL FIBRILLATION: Chronic | Status: ACTIVE | Noted: 2019-06-08

## 2019-07-10 PROBLEM — Z87.438 PERSONAL HISTORY OF OTHER DISEASES OF MALE GENITAL ORGANS: Chronic | Status: ACTIVE | Noted: 2019-06-08

## 2019-07-10 PROBLEM — Z87.39 PERSONAL HISTORY OF OTHER DISEASES OF THE MUSCULOSKELETAL SYSTEM AND CONNECTIVE TISSUE: Chronic | Status: ACTIVE | Noted: 2019-06-08

## 2019-07-13 ENCOUNTER — OUTPATIENT (OUTPATIENT)
Dept: OUTPATIENT SERVICES | Facility: HOSPITAL | Age: 69
LOS: 1 days | Discharge: HOME | End: 2019-07-13
Payer: MEDICARE

## 2019-07-13 DIAGNOSIS — R91.8 OTHER NONSPECIFIC ABNORMAL FINDING OF LUNG FIELD: ICD-10-CM

## 2019-07-13 DIAGNOSIS — Z95.2 PRESENCE OF PROSTHETIC HEART VALVE: Chronic | ICD-10-CM

## 2019-07-13 PROCEDURE — 71260 CT THORAX DX C+: CPT | Mod: 26

## 2019-08-23 ENCOUNTER — APPOINTMENT (OUTPATIENT)
Dept: CARDIOLOGY | Facility: CLINIC | Age: 69
End: 2019-08-23
Payer: MEDICARE

## 2019-08-23 VITALS
SYSTOLIC BLOOD PRESSURE: 123 MMHG | BODY MASS INDEX: 29.82 KG/M2 | DIASTOLIC BLOOD PRESSURE: 77 MMHG | WEIGHT: 226 LBS | HEART RATE: 80 BPM

## 2019-08-23 PROCEDURE — 93280 PM DEVICE PROGR EVAL DUAL: CPT

## 2019-08-23 PROCEDURE — 99213 OFFICE O/P EST LOW 20 MIN: CPT

## 2019-08-23 RX ORDER — DOXYCYCLINE HYCLATE 100 MG/1
100 CAPSULE ORAL
Refills: 0 | Status: COMPLETED | COMMUNITY
End: 2019-08-23

## 2019-08-23 NOTE — PHYSICAL EXAM
[General Appearance - Well Developed] : well developed [Normal Appearance] : normal appearance [Well Groomed] : well groomed [General Appearance - In No Acute Distress] : no acute distress [No Deformities] : no deformities [General Appearance - Well Nourished] : well nourished [Heart Rate And Rhythm] : heart rate and rhythm were normal [Murmurs] : no murmurs present [Heart Sounds] : normal S1 and S2 [Respiration, Rhythm And Depth] : normal respiratory rhythm and effort [Exaggerated Use Of Accessory Muscles For Inspiration] : no accessory muscle use [Auscultation Breath Sounds / Voice Sounds] : lungs were clear to auscultation bilaterally [Well-Healed] : well-healed [Dry] : dry [Clean] : clean [Abdomen Soft] : soft [Abdomen Tenderness] : non-tender [Abdomen Mass (___ Cm)] : no abdominal mass palpated [Nail Clubbing] : no clubbing of the fingernails [Petechial Hemorrhages (___cm)] : no petechial hemorrhages [Cyanosis, Localized] : no localized cyanosis [] : no ischemic changes

## 2019-08-23 NOTE — PROCEDURE
[Complete Heart Block] : complete heart block [CRT-P] : Cardiac resynchronization therapy pacemaker [DDDR] : DDDR [Voltage: ___ volts] : Voltage was [unfilled] volts [Longevity: ___ months] : The estimated remaining battery life is [unfilled] months [Threshold Testing Performed] : Threshold testing was performed [Atrial] : Atrial [Ventricular] : Ventricular [Sensing Amplitude ___mv] : sensing amplitude was [unfilled] mv [Lead Imp:  ___ohms] : lead impedance was [unfilled] ohms [___V @] : [unfilled] V [___ ms] : [unfilled] ms [Programmed for Longevity] : output reprogrammed for improved battery longevity [Counters Reset] : the counters were reset [Asense-Vsense ___ %] : Asense-Vsense [unfilled]% [Asense-Vpace ___ %] : Asense-Vpace [unfilled]% [Apace-Vsense ___ %] : Apace-Vsense [unfilled]% [Apace-Vpace ___ %] : Apace-Vpace [unfilled]% [de-identified] : FATOUMATA [de-identified] : 70 [de-identified] : [id number] [de-identified] : Percepta [de-identified] : Normal device function. No events

## 2019-08-23 NOTE — HISTORY OF PRESENT ILLNESS
[None] : The patient complains of no symptoms [Palpitations] : no palpitations [SOB] : no dyspnea [Syncope] : no syncope [ICD Shocks] : no recent ICD shocks [Swelling at Site] : no swelling at device site [Erythema at Site] : no erythema at device site [de-identified] : Patient was in hospital for Enterococcus Bacteremia - TTE negative for vegetation and was treated as endocarditis. Pt is off Abx two weeks no symptoms.

## 2020-01-29 NOTE — ED PROVIDER NOTE - CARE PLAN
Principal Discharge DX:	Fever  Secondary Diagnosis:	Dyspnea on exertion  Secondary Diagnosis:	History of endocarditis
None

## 2020-02-18 ENCOUNTER — APPOINTMENT (OUTPATIENT)
Dept: CARDIOLOGY | Facility: CLINIC | Age: 70
End: 2020-02-18
Payer: MEDICARE

## 2020-02-18 VITALS
WEIGHT: 223 LBS | SYSTOLIC BLOOD PRESSURE: 124 MMHG | HEIGHT: 73 IN | DIASTOLIC BLOOD PRESSURE: 71 MMHG | BODY MASS INDEX: 29.55 KG/M2 | HEART RATE: 90 BPM

## 2020-02-18 PROCEDURE — 99213 OFFICE O/P EST LOW 20 MIN: CPT

## 2020-02-18 PROCEDURE — 93281 PM DEVICE PROGR EVAL MULTI: CPT

## 2020-02-18 NOTE — PHYSICAL EXAM
[Well Groomed] : well groomed [General Appearance - Well Developed] : well developed [Normal Appearance] : normal appearance [No Deformities] : no deformities [General Appearance - In No Acute Distress] : no acute distress [General Appearance - Well Nourished] : well nourished [Heart Sounds] : normal S1 and S2 [Heart Rate And Rhythm] : heart rate and rhythm were normal [Respiration, Rhythm And Depth] : normal respiratory rhythm and effort [Murmurs] : no murmurs present [Clean] : clean [Auscultation Breath Sounds / Voice Sounds] : lungs were clear to auscultation bilaterally [Exaggerated Use Of Accessory Muscles For Inspiration] : no accessory muscle use [Dry] : dry [Well-Healed] : well-healed [Abdomen Tenderness] : non-tender [Abdomen Soft] : soft [Nail Clubbing] : no clubbing of the fingernails [Abdomen Mass (___ Cm)] : no abdominal mass palpated [Cyanosis, Localized] : no localized cyanosis [] : no ischemic changes [Petechial Hemorrhages (___cm)] : no petechial hemorrhages [Left Infraclavicular] : left infraclavicular area

## 2020-03-25 NOTE — PROCEDURE
[Complete Heart Block] : complete heart block [Voltage: ___ volts] : Voltage was [unfilled] volts [DDDR] : DDDR [CRT-P] : Cardiac resynchronization therapy pacemaker [Threshold Testing Performed] : Threshold testing was performed [Atrial] : Atrial [Ventricular] : Ventricular [Sensing Amplitude ___mv] : sensing amplitude was [unfilled] mv [___ ms] : [unfilled] ms [Asense-Vsense ___ %] : Asense-Vsense [unfilled]% [Counters Reset] : the counters were reset [Programmed for Longevity] : output reprogrammed for improved battery longevity [Apace-Vsense ___ %] : Apace-Vsense [unfilled]% [Asense-Vpace ___ %] : Asense-Vpace [unfilled]% [Apace-Vpace ___ %] : Apace-Vpace [unfilled]% [Longevity: ___ months] : The estimated remaining battery life is [unfilled] months [___V @] : [unfilled] V [Lead Imp:  ___ohms] : lead impedance was [unfilled] ohms [de-identified] : FATOUMATA [de-identified] : Percepta [de-identified] : [id number] [de-identified] : 70 [de-identified] : Normal device function. No events. \par Chronically elevated LV threshold

## 2020-03-25 NOTE — ASSESSMENT
[FreeTextEntry1] : \par 1. CHB/ PPM , S/P upgraded to  CRT-P 1/30/2018 , hx of Paroxysmal AFlutter \par -Normal CRT-P function \par -No arrhythmias recorded \par -BiV paced 98.2% , AP 80.6% \par -Not interested in remote monitoring . \par -Continue current medications. \par -RTO in 6 months \par -F/U with OD as scheduled \par \par

## 2020-03-25 NOTE — HISTORY OF PRESENT ILLNESS
[Palpitations] : no palpitations [SOB] : no dyspnea [Syncope] : no syncope [ICD Shocks] : no recent ICD shocks [Erythema at Site] : no erythema at device site [Swelling at Site] : no swelling at device site [de-identified] : 70 y/o male with a CRT-P for CHB presents for a routine device interrogation . \par \par \par Denies CP/SOB or palpitations . NO dizziness , near syncope or falls . \par \par Hx of  Enterococcus Bacteremia 7/2019  - TTE negative for vegetation and was treated as endocarditis. \par Seen by ID/ Dr. Gleser ,  on Amoxicillin po

## 2020-08-18 ENCOUNTER — APPOINTMENT (OUTPATIENT)
Dept: CARDIOLOGY | Facility: CLINIC | Age: 70
End: 2020-08-18
Payer: MEDICARE

## 2020-08-18 VITALS
DIASTOLIC BLOOD PRESSURE: 87 MMHG | HEART RATE: 88 BPM | TEMPERATURE: 97.8 F | HEIGHT: 73 IN | SYSTOLIC BLOOD PRESSURE: 133 MMHG | BODY MASS INDEX: 29.55 KG/M2 | WEIGHT: 223 LBS

## 2020-08-18 DIAGNOSIS — R00.1 BRADYCARDIA, UNSPECIFIED: ICD-10-CM

## 2020-08-18 PROCEDURE — 93281 PM DEVICE PROGR EVAL MULTI: CPT

## 2020-08-18 PROCEDURE — 99213 OFFICE O/P EST LOW 20 MIN: CPT

## 2020-08-18 NOTE — PROCEDURE
[Complete Heart Block] : complete heart block [See Scanned Paceart Report] : See scanned paceart report [See Device Printout] : See device printout [CRT-P] : Cardiac resynchronization therapy pacemaker [DDDR] : DDDR [Atrial] : Atrial [Threshold Testing Performed] : Threshold testing was performed [Ventricular] : Ventricular [___V @] : [unfilled] V [Programmed for Longevity] : output reprogrammed for improved battery longevity [Counters Reset] : the counters were reset [Asense-Vsense ___ %] : Asense-Vsense [unfilled]% [Asense-Vpace ___ %] : Asense-Vpace [unfilled]% [Apace-Vsense ___ %] : Apace-Vsense [unfilled]% [Apace-Vpace ___ %] : Apace-Vpace [unfilled]% [de-identified] : FATOUMATA [de-identified] : Percepta [de-identified] : [id number] [de-identified] : 69 [de-identified] : Normal device function. No events. \par Chronically elevated LV threshold

## 2020-08-18 NOTE — HISTORY OF PRESENT ILLNESS
[None] : The patient complains of no symptoms [Palpitations] : no palpitations [SOB] : no dyspnea [Syncope] : no syncope [ICD Shocks] : no recent ICD shocks [Swelling at Site] : no swelling at device site [Erythema at Site] : no erythema at device site [de-identified] : 71 y/o male with a CRT-P for CHB presents for a routine device interrogation . \par \par \par Denies CP/SOB or palpitations . NO dizziness , near syncope or falls . \par Recently diagnose with DM\par \par Hx of  Enterococcus Bacteremia 7/2019  - TTE negative for vegetation and was treated as endocarditis. \par Seen by ID/ Dr. Gleser ,  on Amoxicillin po

## 2020-08-18 NOTE — ASSESSMENT
[FreeTextEntry1] : \par 1. CHB/ PPM , S/P upgraded to  CRT-P 1/30/2018 , hx of Paroxysmal AFlutter \par -Normal CRT-P function \par -No arrhythmias recorded \par -Not interested in remote monitoring . \par -Continue current medications. \par -RTO in 6 months \par \par \par

## 2020-08-18 NOTE — PHYSICAL EXAM
[General Appearance - Well Developed] : well developed [Normal Appearance] : normal appearance [Well Groomed] : well groomed [General Appearance - Well Nourished] : well nourished [No Deformities] : no deformities [General Appearance - In No Acute Distress] : no acute distress [Heart Rate And Rhythm] : heart rate and rhythm were normal [Murmurs] : no murmurs present [Heart Sounds] : normal S1 and S2 [Respiration, Rhythm And Depth] : normal respiratory rhythm and effort [Exaggerated Use Of Accessory Muscles For Inspiration] : no accessory muscle use [Auscultation Breath Sounds / Voice Sounds] : lungs were clear to auscultation bilaterally [Clean] : clean [Left Infraclavicular] : left infraclavicular area [Dry] : dry [Well-Healed] : well-healed [Abdomen Tenderness] : non-tender [Abdomen Soft] : soft [Abdomen Mass (___ Cm)] : no abdominal mass palpated [Nail Clubbing] : no clubbing of the fingernails [] : no ischemic changes [Petechial Hemorrhages (___cm)] : no petechial hemorrhages [Cyanosis, Localized] : no localized cyanosis

## 2020-10-13 ENCOUNTER — OUTPATIENT (OUTPATIENT)
Dept: OUTPATIENT SERVICES | Facility: HOSPITAL | Age: 70
LOS: 1 days | Discharge: HOME | End: 2020-10-13
Payer: MEDICARE

## 2020-10-13 DIAGNOSIS — I71.2 THORACIC AORTIC ANEURYSM, WITHOUT RUPTURE: ICD-10-CM

## 2020-10-13 DIAGNOSIS — Z95.2 PRESENCE OF PROSTHETIC HEART VALVE: Chronic | ICD-10-CM

## 2020-10-13 PROCEDURE — 71275 CT ANGIOGRAPHY CHEST: CPT | Mod: 26

## 2021-02-12 ENCOUNTER — APPOINTMENT (OUTPATIENT)
Dept: CARDIOLOGY | Facility: CLINIC | Age: 71
End: 2021-02-12

## 2021-03-05 ENCOUNTER — APPOINTMENT (OUTPATIENT)
Dept: CARDIOLOGY | Facility: CLINIC | Age: 71
End: 2021-03-05
Payer: MEDICARE

## 2021-03-05 VITALS
BODY MASS INDEX: 28.89 KG/M2 | HEIGHT: 73 IN | TEMPERATURE: 96.6 F | SYSTOLIC BLOOD PRESSURE: 125 MMHG | DIASTOLIC BLOOD PRESSURE: 71 MMHG | WEIGHT: 218 LBS

## 2021-03-05 PROCEDURE — 93281 PM DEVICE PROGR EVAL MULTI: CPT

## 2021-03-05 NOTE — HISTORY OF PRESENT ILLNESS
[None] : The patient complains of no symptoms [Palpitations] : no palpitations [SOB] : no dyspnea [Syncope] : no syncope [ICD Shocks] : no recent ICD shocks [Erythema at Site] : no erythema at device site [Swelling at Site] : no swelling at device site [de-identified] : Remote added\par \par History a CRT-P for CHB presents for a routine device interrogation . \par \par \par Hx of  Enterococcus Bacteremia 7/2019  - TTE negative for vegetation and was treated as endocarditis. \par Seen by ID/ Dr. Gleser ,  on Amoxicillin po

## 2021-03-05 NOTE — PROCEDURE
[Complete Heart Block] : complete heart block [See Scanned Paceart Report] : See scanned paceart report [See Device Printout] : See device printout [CRT-P] : Cardiac resynchronization therapy pacemaker [DDDR] : DDDR [Threshold Testing Performed] : Threshold testing was performed [Atrial] : Atrial [Ventricular] : Ventricular [Programmed for Longevity] : output reprogrammed for improved battery longevity [Counters Reset] : the counters were reset [Asense-Vsense ___ %] : Asense-Vsense [unfilled]% [Asense-Vpace ___ %] : Asense-Vpace [unfilled]% [Apace-Vsense ___ %] : Apace-Vsense [unfilled]% [Apace-Vpace ___ %] : Apace-Vpace [unfilled]% [de-identified] : FATOUMATA [de-identified] : Percepta [de-identified] : [id number] [de-identified] : 69 [de-identified] : Normal device function. No events. \par Chronically elevated LV threshold

## 2021-06-16 NOTE — PROGRESS NOTE ADULT - ASSESSMENT
[FreeTextEntry1] : 55-year-old woman with history of hypertension, presenting to endocrinology for evaluation of elevated plasma metanephrine level.\par \par 1.  Elevated plasma metanephrine level.\par She denies any family history of pheochromocytoma.\par Patient does not have the classical triad of pheochromocytoma including episodic headache, sweating and tachycardia.  She does have possibly paroxysmal elevation in blood pressure.\par She states that the recent tinnitus has been making her blood pressure worsening.\par I have a somewhat low suspicions that this patient has pheochromocytoma.\par I will obtain a 24-hour urine catecholamine levels.\par As this has a better sensitivity and specificity, 98% for both, for ruling out catecholamine secreting tumors.\par \par 2.  Mildly elevated Jose Maria and renin ratio\par I would recommend repeating aldosterone and renin activity level. RODRICK IQBAL 68y Male  MRN#: [id number]   CODE STATUS: Full code      SUBJECTIVE  Patient is a 68y old Male who presented with a chief complaint of fever, weakness and dyspnea on exertion  Currently admitted to medicine with the primary diagnosis of sepsis secondary to bacteremia, IE ruled out  Today is hospital day 6d, and this morning he is resting in bed and reports no overnight events. Patient states that he sleep fairly well overnight. Denies fever/chills, chest pain, shortness of breath, abdominal pain, urinary symptoms. States that he is back to baseline. Patient is upset that he is staying overnight because of his abnormal CT scan.       OBJECTIVE  PAST MEDICAL & SURGICAL HISTORY  Infective endocarditis  History of BPH  Dyslipidemia  Hypertension  H/O: osteoarthritis  Atrial fibrillation  S/P AVR (aortic valve replacement): complicated by aortic aneurysm and infective endocarditis    ALLERGIES:  No Known Allergies      HOME MEDICATIONS:  HOME MEDICATIONS:  ampicillin 2 g injection: 2 gram(s) injectable every 4 hours (ends on 7/21/19) (13 Jun 2019 06:02)  aspirin 81 mg oral tablet: 1 tab(s) orally once a day (08 Jun 2019 02:42)  Crestor 10 mg oral tablet: 1 tab(s) orally once a day (08 Jun 2019 02:41)  Eliquis 5 mg oral tablet: 1 tab(s) orally 2 times a day (08 Jun 2019 02:41)  fenofibrate 134 mg oral capsule: 1 cap(s) orally once a day (08 Jun 2019 02:41)  metoprolol succinate 25 mg oral tablet, extended release: 1 tab(s) orally 2 times a day (08 Jun 2019 02:41)  Uroxatral 10 mg oral tablet, extended release: 1 tab(s) orally once a day (08 Jun 2019 02:42)      MEDICATIONS:  STANDING MEDICATIONS  ALBUTerol/ipratropium for Nebulization 3 milliLiter(s) Nebulizer every 6 hours  ampicillin  IVPB      ampicillin  IVPB 2 Gram(s) IV Intermittent every 4 hours  apixaban 5 milliGRAM(s) Oral every 12 hours  aspirin enteric coated 81 milliGRAM(s) Oral daily  atorvastatin 40 milliGRAM(s) Oral at bedtime  cefTRIAXone   IVPB      cefTRIAXone   IVPB 2 Gram(s) IV Intermittent once  cefTRIAXone   IVPB 2 Gram(s) IV Intermittent every 12 hours  metoprolol tartrate 25 milliGRAM(s) Oral two times a day  simethicone 80 milliGRAM(s) Chew every 8 hours  tamsulosin 0.8 milliGRAM(s) Oral at bedtime    PRN MEDICATIONS  acetaminophen   Tablet .. 650 milliGRAM(s) Oral every 6 hours PRN  ibuprofen  Tablet. 400 milliGRAM(s) Oral every 6 hours PRN      VITAL SIGNS: Last 24 Hours  T(C): 36.3 (13 Jun 2019 05:35), Max: 37.7 (12 Jun 2019 21:30)  T(F): 97.3 (13 Jun 2019 05:35), Max: 99.8 (12 Jun 2019 21:30)  HR: 70 (13 Jun 2019 05:35) (69 - 70)  BP: 116/61 (13 Jun 2019 05:35) (116/61 - 139/84)  BP(mean): --  RR: 18 (13 Jun 2019 05:35) (18 - 18)  SpO2: --    LABS:                        13.1   7.27  )-----------( 195      ( 13 Jun 2019 07:16 )             38.9     06-13    140  |  104  |  11  ----------------------------<  106<H>  3.9   |  22  |  0.8    Ca    8.5      13 Jun 2019 07:16  Mg     1.9     06-12    Culture - Blood (collected 11 Jun 2019 07:56)  Source: .Blood None  Preliminary Report (12 Jun 2019 14:01):    No growth to date.      RADIOLOGY:    < from: CT Chest w/ IV Cont (06.12.19 @ 23:06) >  1.  Post ascending aortic replacement for aortic aneurysm. Replaced aorta   measures 2.8 cm.  2.  Post aortic valve replacement.  3.  Status post coronary artery bypass surgery to left main/proximal LAD   and right coronary reimplantation.  4.  Fluid attenuation extends from posterior sternum to pericardium and   abuts ascending aorta, corresponds to the finding of previous positive   gallium scan.  5.  Small bilateral pleural effusion with atelectasis.    < end of copied text >      < from: NM SPECT Inflammation Imaging Loc (06.12.19 @ 17:13) >  Persistent 4+ intensity abnormal gallium uptake focally in the anterior   lower chest/upper abdomen in the region of the xiphoid or inferoposterior   to the xiphoid suspicious for focal site of inflammation in light of the   history. CT of the chest with attention to the anterior lower chest/upper   abdomen at the level of the xiphoid/midline suggested    No other definite sites of abnormal gallium uptake    < end of copied text >    < from: Xray Chest 1 View-PORTABLE IMMEDIATE (06.07.19 @ 23:45) >  1. Linear subsegmental atelectasis/scarring at the left base.  2. Otherwise, no radiographic evidence of acute cardiopulmonary disease.    < end of copied text >    < from: Transthoracic Echocardiogram (06.08.19 @ 17:12) >   1. Normal global left ventricular systolic function.   2. LV Ejection Fractionby Keith's Method with a biplane EF of 64 %.   3. Elevated left atrial and left ventricular end-diastolic pressures.   4. Mild concentric left ventricular hypertrophy.   5. Mildly increased LV wall thickness.   6. Normal left ventricular internal cavity size.   7. Spectral Doppler shows pseudonormal pattern of left ventricular   myocardial filling (Grade II diastolic dysfunction).   8. 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   9. LA volume Index is 35.4 ml/m² ml/m2.    < end of copied text >      PHYSICAL EXAM:    GENERAL: Mildly lethargic, well-developed, AAOx3  HEENT:  Atraumatic, Normocephalic. Conjunctiva pink and cornea white, No JVD  PULMONARY: Clear to auscultation bilaterally; No wheeze  CARDIOVASCULAR: Regular rate and rhythm; Loud S1S2 with grade 2/6 systolic murmurs best heard at RUSB  GASTROINTESTINAL: Obese abdomen, soft, nontender, nondistended; Bowel sounds present  EXTREMITIES:  2+ Peripheral Pulses, No petal edema; no splinter hemorrhage, no tender nodules on palms  NEUROLOGY: non-focal  SKIN: No rashes or lesions      ADMISSION SUMMARY  Patient is a 68y old Male who presented with a chief complaint of fever, weakness and dyspnea on exertion  Currently admitted to medicine with the primary diagnosis of sepsis secondary to bacteremia, IE ruled out    ASSESSMENT & PLAN  68M with PMHx of HTN, Afib on Eliquis, SAVR (complicated by aortic aneurysm and infective endocarditis in 2011 and 2012) s/p PPM on daily doxycycline for ppx presents for weakness and fever which began the morning of presentation.      # Sepsis secondary to enterococcus bacteremia - resolved  - U/A negative, CXR negative, no skin or soft tissue infections; patient has been on daily doxy 100mg since 2012 suggested by Cardiologist (Dr. Foster)  - BCx 6/7 shows enterococcus (sensitive to Amp, Vanco, and gent); BCx 6/8 shows G+C in pairs and chains, BCx on 6/9 and 6/10 NGTD  - TTE 6/8 shows EF 64%, G1DD, no valvular vegetation  - MELVIN 6/10 shows EF 60-65% no vegetation, normal functioning bioprosthetic aortic valve, normal mitral and tricuspid valve, clear PM wires, 3 wires detected, no mass or thrombus in the chamber  - 48 hrs Gallium scan shows persistent abnormal gallium uptake in the anterior midline, in region of left hepatic lobe dome/xiphoid process, CT chest with IV contrast shows fluid attenuation behind sternum, consistent with gallium uptake  - Continue ampicillin 2g Q4hrs and Ceftriaxone 2g Q12hrs  - ID recs appreciated (Dr. Avalos): CTS consult for possible drainage of fluid. Will need likely 6 weeks of IV abx, can hold doxy for now while on IV abx  - Cardio recs appreciated (Dr. Foster): will likely need 4 weeks of antibiotics, but follow up with ID  - Need weekly CBC, CMP and weekly follow up with ID outpatient: 6/25/19 11:25am [address]  - Follow up CTS consult (PA notified)    # Chronic AFib with PPM  - CHADsVASc 2 (Age, HTN)  - Currently at paced rhythm on EKG  - Continue BB and Eliquis  - PPM interrogation no events    # DLD  - Continue Lipitor 40mg and ASA    # HTN  - Continue metoprolol    # BPH  - Takes alfuzosin 10mg at home, will give Flomax 0.8mg QHS       DVT ppx: Eliquis  GI ppx: Not indicated  Diet: DASH  Activity: ambulate as tolerated  Lines: Peripheral IVs  Code status: Full code  Dispo: likely d/c tomorrow if no procedure is planned RODRICK IQBAL 68y Male  MRN#: [id number]   CODE STATUS: Full code      SUBJECTIVE  Patient is a 68y old Male who presented with a chief complaint of fever, weakness and dyspnea on exertion  Currently admitted to medicine with the primary diagnosis of sepsis secondary to bacteremia, IE ruled out  Today is hospital day 6d, and this morning he is resting in bed and reports no overnight events. Patient states that he sleep fairly well overnight. Denies fever/chills, chest pain, shortness of breath, abdominal pain, urinary symptoms. States that he is back to baseline. Patient is upset that he is staying overnight because of his abnormal CT scan.       OBJECTIVE  PAST MEDICAL & SURGICAL HISTORY  Infective endocarditis  History of BPH  Dyslipidemia  Hypertension  H/O: osteoarthritis  Atrial fibrillation  S/P AVR (aortic valve replacement): complicated by aortic aneurysm and infective endocarditis    ALLERGIES:  No Known Allergies      HOME MEDICATIONS:  HOME MEDICATIONS:  ampicillin 2 g injection: 2 gram(s) injectable every 4 hours (ends on 7/21/19) (13 Jun 2019 06:02)  aspirin 81 mg oral tablet: 1 tab(s) orally once a day (08 Jun 2019 02:42)  Crestor 10 mg oral tablet: 1 tab(s) orally once a day (08 Jun 2019 02:41)  Eliquis 5 mg oral tablet: 1 tab(s) orally 2 times a day (08 Jun 2019 02:41)  fenofibrate 134 mg oral capsule: 1 cap(s) orally once a day (08 Jun 2019 02:41)  metoprolol succinate 25 mg oral tablet, extended release: 1 tab(s) orally 2 times a day (08 Jun 2019 02:41)  Uroxatral 10 mg oral tablet, extended release: 1 tab(s) orally once a day (08 Jun 2019 02:42)      MEDICATIONS:  STANDING MEDICATIONS  ALBUTerol/ipratropium for Nebulization 3 milliLiter(s) Nebulizer every 6 hours  ampicillin  IVPB      ampicillin  IVPB 2 Gram(s) IV Intermittent every 4 hours  apixaban 5 milliGRAM(s) Oral every 12 hours  aspirin enteric coated 81 milliGRAM(s) Oral daily  atorvastatin 40 milliGRAM(s) Oral at bedtime  cefTRIAXone   IVPB      cefTRIAXone   IVPB 2 Gram(s) IV Intermittent once  cefTRIAXone   IVPB 2 Gram(s) IV Intermittent every 12 hours  metoprolol tartrate 25 milliGRAM(s) Oral two times a day  simethicone 80 milliGRAM(s) Chew every 8 hours  tamsulosin 0.8 milliGRAM(s) Oral at bedtime    PRN MEDICATIONS  acetaminophen   Tablet .. 650 milliGRAM(s) Oral every 6 hours PRN  ibuprofen  Tablet. 400 milliGRAM(s) Oral every 6 hours PRN      VITAL SIGNS: Last 24 Hours  T(C): 36.3 (13 Jun 2019 05:35), Max: 37.7 (12 Jun 2019 21:30)  T(F): 97.3 (13 Jun 2019 05:35), Max: 99.8 (12 Jun 2019 21:30)  HR: 70 (13 Jun 2019 05:35) (69 - 70)  BP: 116/61 (13 Jun 2019 05:35) (116/61 - 139/84)  BP(mean): --  RR: 18 (13 Jun 2019 05:35) (18 - 18)  SpO2: --    LABS:                        13.1   7.27  )-----------( 195      ( 13 Jun 2019 07:16 )             38.9     06-13    140  |  104  |  11  ----------------------------<  106<H>  3.9   |  22  |  0.8    Ca    8.5      13 Jun 2019 07:16  Mg     1.9     06-12    Culture - Blood (collected 11 Jun 2019 07:56)  Source: .Blood None  Preliminary Report (12 Jun 2019 14:01):    No growth to date.      RADIOLOGY:    < from: CT Chest w/ IV Cont (06.12.19 @ 23:06) >  1.  Post ascending aortic replacement for aortic aneurysm. Replaced aorta   measures 2.8 cm.  2.  Post aortic valve replacement.  3.  Status post coronary artery bypass surgery to left main/proximal LAD   and right coronary reimplantation.  4.  Fluid attenuation extends from posterior sternum to pericardium and   abuts ascending aorta, corresponds to the finding of previous positive   gallium scan.  5.  Small bilateral pleural effusion with atelectasis.    < end of copied text >      < from: NM SPECT Inflammation Imaging Loc (06.12.19 @ 17:13) >  Persistent 4+ intensity abnormal gallium uptake focally in the anterior   lower chest/upper abdomen in the region of the xiphoid or inferoposterior   to the xiphoid suspicious for focal site of inflammation in light of the   history. CT of the chest with attention to the anterior lower chest/upper   abdomen at the level of the xiphoid/midline suggested    No other definite sites of abnormal gallium uptake    < end of copied text >    < from: Xray Chest 1 View-PORTABLE IMMEDIATE (06.07.19 @ 23:45) >  1. Linear subsegmental atelectasis/scarring at the left base.  2. Otherwise, no radiographic evidence of acute cardiopulmonary disease.    < end of copied text >    < from: Transthoracic Echocardiogram (06.08.19 @ 17:12) >   1. Normal global left ventricular systolic function.   2. LV Ejection Fractionby Keith's Method with a biplane EF of 64 %.   3. Elevated left atrial and left ventricular end-diastolic pressures.   4. Mild concentric left ventricular hypertrophy.   5. Mildly increased LV wall thickness.   6. Normal left ventricular internal cavity size.   7. Spectral Doppler shows pseudonormal pattern of left ventricular   myocardial filling (Grade II diastolic dysfunction).   8. 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   9. LA volume Index is 35.4 ml/m² ml/m2.    < end of copied text >      PHYSICAL EXAM:    GENERAL: Mildly lethargic, well-developed, AAOx3  HEENT:  Atraumatic, Normocephalic. Conjunctiva pink and cornea white, No JVD  PULMONARY: Clear to auscultation bilaterally; No wheeze  CARDIOVASCULAR: Regular rate and rhythm; Loud S1S2 with grade 2/6 systolic murmurs best heard at RUSB  GASTROINTESTINAL: Obese abdomen, soft, nontender, nondistended; Bowel sounds present  EXTREMITIES:  2+ Peripheral Pulses, No petal edema; no splinter hemorrhage, no tender nodules on palms  NEUROLOGY: non-focal  SKIN: No rashes or lesions      ADMISSION SUMMARY  Patient is a 68y old Male who presented with a chief complaint of fever, weakness and dyspnea on exertion  Currently admitted to medicine with the primary diagnosis of sepsis secondary to bacteremia, IE ruled out    ASSESSMENT & PLAN  68M with PMHx of HTN, Afib on Eliquis, SAVR (complicated by aortic aneurysm and infective endocarditis in 2011 and 2012) s/p PPM on daily doxycycline for ppx presents for weakness and fever which began the morning of presentation.      # Sepsis secondary to enterococcus bacteremia - resolved  - U/A negative, CXR negative, no skin or soft tissue infections; patient has been on daily doxy 100mg since 2012 suggested by Cardiologist (Dr. Foster)  - BCx 6/7 shows enterococcus (sensitive to Amp, Vanco, and gent); BCx 6/8 shows G+C in pairs and chains, BCx on 6/9 and 6/10 NGTD  - TTE 6/8 shows EF 64%, G1DD, no valvular vegetation  - MELVIN 6/10 shows EF 60-65% no vegetation, normal functioning bioprosthetic aortic valve, normal mitral and tricuspid valve, clear PM wires, 3 wires detected, no mass or thrombus in the chamber  - 48 hrs Gallium scan shows persistent abnormal gallium uptake in the anterior midline, in region of left hepatic lobe dome/xiphoid process, CT chest with IV contrast shows fluid attenuation behind sternum, consistent with gallium uptake  - Continue ampicillin 2g Q4hrs and Ceftriaxone 2g Q12hrs  - ID recs appreciated (Dr. Avalos): CTS consult for possible drainage of fluid. Will need likely 6 weeks of IV abx, can hold doxy for now while on IV abx  - Cardio recs appreciated (Dr. Foster): will likely need 4 weeks of antibiotics, but follow up with ID  - Need weekly CBC, CMP and weekly follow up with ID outpatient: 6/25/19 11:25am [address]  - Case discussed with Brian Donnelly and Bailey, no urgent intervention is needed, can be discharged home tomorrow to complete course of antibiotics. Patient will need to follow up with his own CT surgeon at [facility].     # Chronic AFib with PPM  - CHADsVASc 2 (Age, HTN)  - Currently at paced rhythm on EKG  - Continue BB and Eliquis  - PPM interrogation no events    # DLD  - Continue Lipitor 40mg and ASA    # HTN  - Continue metoprolol    # BPH  - Takes alfuzosin 10mg at home, will give Flomax 0.8mg QHS       DVT ppx: Eliquis  GI ppx: Not indicated  Diet: DASH  Activity: ambulate as tolerated  Lines: Peripheral IVs  Code status: Full code  Dispo: likely d/c tomorrow if no procedure is planned

## 2021-09-07 ENCOUNTER — NON-APPOINTMENT (OUTPATIENT)
Age: 71
End: 2021-09-07

## 2021-09-07 ENCOUNTER — APPOINTMENT (OUTPATIENT)
Dept: CARDIOLOGY | Facility: CLINIC | Age: 71
End: 2021-09-07
Payer: MEDICARE

## 2021-09-07 PROCEDURE — 93294 REM INTERROG EVL PM/LDLS PM: CPT

## 2021-09-07 PROCEDURE — 93296 REM INTERROG EVL PM/IDS: CPT

## 2021-12-08 ENCOUNTER — APPOINTMENT (OUTPATIENT)
Dept: CARDIOLOGY | Facility: CLINIC | Age: 71
End: 2021-12-08
Payer: MEDICARE

## 2021-12-08 ENCOUNTER — NON-APPOINTMENT (OUTPATIENT)
Age: 71
End: 2021-12-08

## 2021-12-08 PROCEDURE — 93296 REM INTERROG EVL PM/IDS: CPT

## 2021-12-08 PROCEDURE — 93294 REM INTERROG EVL PM/LDLS PM: CPT

## 2022-01-13 ENCOUNTER — APPOINTMENT (OUTPATIENT)
Dept: CARDIOLOGY | Facility: CLINIC | Age: 72
End: 2022-01-13

## 2022-03-09 ENCOUNTER — NON-APPOINTMENT (OUTPATIENT)
Age: 72
End: 2022-03-09

## 2022-03-09 ENCOUNTER — APPOINTMENT (OUTPATIENT)
Dept: CARDIOLOGY | Facility: CLINIC | Age: 72
End: 2022-03-09
Payer: MEDICARE

## 2022-03-09 PROCEDURE — 93294 REM INTERROG EVL PM/LDLS PM: CPT

## 2022-03-09 PROCEDURE — 93296 REM INTERROG EVL PM/IDS: CPT

## 2022-03-16 ENCOUNTER — APPOINTMENT (OUTPATIENT)
Dept: CARDIOLOGY | Facility: CLINIC | Age: 72
End: 2022-03-16
Payer: MEDICARE

## 2022-03-16 ENCOUNTER — NON-APPOINTMENT (OUTPATIENT)
Age: 72
End: 2022-03-16

## 2022-03-16 VITALS
HEART RATE: 91 BPM | HEIGHT: 73 IN | BODY MASS INDEX: 29.16 KG/M2 | SYSTOLIC BLOOD PRESSURE: 139 MMHG | TEMPERATURE: 97.7 F | DIASTOLIC BLOOD PRESSURE: 84 MMHG | WEIGHT: 220 LBS

## 2022-03-16 PROCEDURE — 93290 INTERROG DEV EVAL ICPMS IP: CPT

## 2022-03-16 PROCEDURE — 99213 OFFICE O/P EST LOW 20 MIN: CPT

## 2022-03-16 PROCEDURE — 93000 ELECTROCARDIOGRAM COMPLETE: CPT | Mod: 59

## 2022-03-16 PROCEDURE — 93281 PM DEVICE PROGR EVAL MULTI: CPT

## 2022-03-28 NOTE — PHYSICAL EXAM
[General Appearance - Well Developed] : well developed [Normal Appearance] : normal appearance [Well Groomed] : well groomed [General Appearance - Well Nourished] : well nourished [No Deformities] : no deformities [General Appearance - In No Acute Distress] : no acute distress [Heart Rate And Rhythm] : heart rate and rhythm were normal [Heart Sounds] : normal S1 and S2 [Murmurs] : no murmurs present [] : no respiratory distress [Respiration, Rhythm And Depth] : normal respiratory rhythm and effort [Exaggerated Use Of Accessory Muscles For Inspiration] : no accessory muscle use [Auscultation Breath Sounds / Voice Sounds] : lungs were clear to auscultation bilaterally [Left Infraclavicular] : left infraclavicular area [Clean] : clean [Dry] : dry [Well-Healed] : well-healed [Abdomen Soft] : soft [Nail Clubbing] : no clubbing of the fingernails [Cyanosis, Localized] : no localized cyanosis

## 2022-04-14 NOTE — PROCEDURE
[Complete Heart Block] : complete heart block [CRT-P] : Cardiac resynchronization therapy pacemaker [Voltage: ___ volts] : Voltage was [unfilled] volts [Magnet Rate: ___ Ppm] : magnet rate was [unfilled] Ppm [Longevity: ___ months] : The estimated remaining battery life is [unfilled] months [Threshold Testing Performed] : Threshold testing was performed [Atrial] : Atrial [Ventricular] : Ventricular [Sensing Amplitude ___mv] : sensing amplitude was [unfilled] mv [Lead Imp:  ___ohms] : lead impedance was [unfilled] ohms [___V @] : [unfilled] V [___ ms] : [unfilled] ms [None] : none [Programmed for Longevity] : output reprogrammed for improved battery longevity [Asense-Vsense ___ %] : Asense-Vsense [unfilled]% [Asense-Vpace ___ %] : Asense-Vpace [unfilled]% [Apace-Vsense ___ %] : Apace-Vsense [unfilled]% [Apace-Vpace ___ %] : Apace-Vpace [unfilled]% [See Device Printout] : See device printout [de-identified] : Sinus thania/CHB 50 BPM [de-identified] : FATOUMATA [de-identified] : Joceline CRT-P [id number] [de-identified] : [id number] [de-identified] : 1/30/2018 [de-identified] : 1 NSVT on 9/16/2021, rate 125 BPM for 10 seconds, dissociated\par Optivol below threshold\par

## 2022-04-14 NOTE — ASSESSMENT
[FreeTextEntry1] : CRT-P interrogation\par - Device interrogation normal. All parameters stable. Patient on remote and transmitting. \par - 1 NSVT - dissociated - seen as V-sensing episode. Reviewed with Dr. Penaloza - will continue to monitor. \par \par PAF\par - On Eliquis, no s/s bleeding reported. Recent labs 11/2021 reviewed - creat 1.15, hgb 14.9.\par \par Plan:\par - Continue current meds\par - Continue remote monitoring q 3 months\par - RTO in 5-6 months

## 2022-04-14 NOTE — HISTORY OF PRESENT ILLNESS
[de-identified] : PCP: Zahraa (send copy of note and interrogation to him) [phone]\par Cardio: Dr. Foster\par \par History a CRT-P for CHB presents for a routine device interrogation. \par Hx of Enterococcus Bacteremia 7/2019 - TTE negative for vegetation and was treated as endocarditis. \par

## 2022-06-14 ENCOUNTER — FORM ENCOUNTER (OUTPATIENT)
Age: 72
End: 2022-06-14

## 2022-06-15 ENCOUNTER — APPOINTMENT (OUTPATIENT)
Dept: CARDIOLOGY | Facility: CLINIC | Age: 72
End: 2022-06-15

## 2022-08-10 ENCOUNTER — APPOINTMENT (OUTPATIENT)
Dept: CARDIOLOGY | Facility: CLINIC | Age: 72
End: 2022-08-10

## 2022-08-10 VITALS
HEIGHT: 73 IN | BODY MASS INDEX: 29.16 KG/M2 | TEMPERATURE: 98 F | RESPIRATION RATE: 16 BRPM | DIASTOLIC BLOOD PRESSURE: 80 MMHG | HEART RATE: 86 BPM | WEIGHT: 220 LBS | SYSTOLIC BLOOD PRESSURE: 128 MMHG

## 2022-08-10 PROCEDURE — 93290 INTERROG DEV EVAL ICPMS IP: CPT

## 2022-08-10 PROCEDURE — 93000 ELECTROCARDIOGRAM COMPLETE: CPT | Mod: 59

## 2022-08-10 PROCEDURE — 99213 OFFICE O/P EST LOW 20 MIN: CPT

## 2022-08-10 PROCEDURE — 93281 PM DEVICE PROGR EVAL MULTI: CPT

## 2022-08-10 RX ORDER — ERGOCALCIFEROL (VITAMIN D2) 1250 MCG
50000 CAPSULE ORAL WEEKLY
Refills: 0 | Status: COMPLETED | COMMUNITY
End: 2022-08-10

## 2022-08-10 RX ORDER — GREEN TEA/HOODIA GORDONII 315-12.5MG
CAPSULE ORAL DAILY
Refills: 0 | Status: COMPLETED | COMMUNITY
End: 2022-08-10

## 2022-08-10 NOTE — ASSESSMENT
[FreeTextEntry1] : CRT-P interrogation\par - Device interrogation normal. All parameters stable. Patient on remote and transmitting. \par - 1 NSVT - seen as V-sensing episode on 4/17/2022. Continue to monitor. \par \par PAF\par - On Eliquis, no s/s bleeding reported. Will request labs from [facility] (done in May?)\par \par Plan:\par - Continue current meds\par - Continue remote monitoring q 3 months\par - RTO in 9 months\par \par Tried to troubleshoot Relay monitor with [employer] rep and tech support in office - unable to connect. He will call from home and use iPad to download [employer] yanelis to connect.\par \par Addendum: Patient connected via yanelis on iPad, instructed to leave yanelis open. He is now connected on [id number].

## 2022-08-10 NOTE — PROCEDURE
[Complete Heart Block] : complete heart block [CRT-P] : Cardiac resynchronization therapy pacemaker [DDDR] : DDDR [Voltage: ___ volts] : Voltage was [unfilled] volts [Longevity: ___ months] : The estimated remaining battery life is [unfilled] months [Normal] : The battery status is normal. [Threshold Testing Performed] : Threshold testing was performed [See Device Printout] : See device printout [Sensing Amplitude ___mv] : sensing amplitude was [unfilled] mv [Lead Imp:  ___ohms] : lead impedance was [unfilled] ohms [___V @] : [unfilled] V [___ ms] : [unfilled] ms [de-identified] : Medtronic [de-identified] : Percepta Quad Percepta CRT-P [de-identified] : [id number] [de-identified] : 1/30/18 [de-identified] : 69 [de-identified] : 1 V-sensing episode 4/17/2022 is NSVT\par  99.9%\par AP 87.3%\par Optivol below threshold

## 2022-08-10 NOTE — HISTORY OF PRESENT ILLNESS
[de-identified] : PCP: Zahraa (send copy of note and interrogation to him) [phone]\par Cardio: Dr. Foster\par \par History a CRT-P for CHB presents for a routine device interrogation. \par Hx of Enterococcus Bacteremia 7/2019 - TTE negative for vegetation and was treated as endocarditis. \par

## 2022-11-09 ENCOUNTER — NON-APPOINTMENT (OUTPATIENT)
Age: 72
End: 2022-11-09

## 2022-11-09 ENCOUNTER — APPOINTMENT (OUTPATIENT)
Dept: CARDIOLOGY | Facility: CLINIC | Age: 72
End: 2022-11-09

## 2022-11-09 PROCEDURE — 93296 REM INTERROG EVL PM/IDS: CPT

## 2022-11-09 PROCEDURE — 93294 REM INTERROG EVL PM/LDLS PM: CPT

## 2023-02-08 ENCOUNTER — NON-APPOINTMENT (OUTPATIENT)
Age: 73
End: 2023-02-08

## 2023-02-08 ENCOUNTER — APPOINTMENT (OUTPATIENT)
Dept: CARDIOLOGY | Facility: CLINIC | Age: 73
End: 2023-02-08
Payer: MEDICARE

## 2023-02-08 PROCEDURE — 93296 REM INTERROG EVL PM/IDS: CPT

## 2023-02-08 PROCEDURE — 93294 REM INTERROG EVL PM/LDLS PM: CPT

## 2023-03-08 NOTE — ED ADULT NURSE NOTE - NS ED NURSE LEVEL OF CONSCIOUSNESS MENTAL STATUS
Cooperative/Alert/Awake Staged Advancement Flap Text: The defect edges were debeveled with a #15 scalpel blade. Given the location of the defect, shape of the defect and the proximity to free margins a staged advancement flap was deemed most appropriate. Using a sterile surgical marker, an appropriate advancement flap was drawn incorporating the defect and placing the expected incisions within the relaxed skin tension lines where possible. The area thus outlined was incised deep to adipose tissue with a #15 scalpel blade. The skin margins were undermined to an appropriate distance in all directions utilizing iris scissors. Following this, the designed flap was carried over into the primary defect and sutured into place.

## 2023-05-09 ENCOUNTER — NON-APPOINTMENT (OUTPATIENT)
Age: 73
End: 2023-05-09

## 2023-05-09 ENCOUNTER — APPOINTMENT (OUTPATIENT)
Dept: CARDIOLOGY | Facility: CLINIC | Age: 73
End: 2023-05-09
Payer: MEDICARE

## 2023-05-09 PROCEDURE — 93296 REM INTERROG EVL PM/IDS: CPT

## 2023-05-09 PROCEDURE — 93294 REM INTERROG EVL PM/LDLS PM: CPT

## 2023-05-10 ENCOUNTER — APPOINTMENT (OUTPATIENT)
Dept: ELECTROPHYSIOLOGY | Facility: CLINIC | Age: 73
End: 2023-05-10

## 2023-06-26 ENCOUNTER — APPOINTMENT (OUTPATIENT)
Dept: ELECTROPHYSIOLOGY | Facility: CLINIC | Age: 73
End: 2023-06-26
Payer: MEDICARE

## 2023-06-26 ENCOUNTER — NON-APPOINTMENT (OUTPATIENT)
Age: 73
End: 2023-06-26

## 2023-06-26 VITALS
SYSTOLIC BLOOD PRESSURE: 102 MMHG | TEMPERATURE: 98 F | BODY MASS INDEX: 28.89 KG/M2 | HEART RATE: 74 BPM | WEIGHT: 218 LBS | DIASTOLIC BLOOD PRESSURE: 64 MMHG | HEIGHT: 73 IN

## 2023-06-26 PROCEDURE — 99213 OFFICE O/P EST LOW 20 MIN: CPT

## 2023-06-26 PROCEDURE — 93281 PM DEVICE PROGR EVAL MULTI: CPT

## 2023-06-26 NOTE — PHYSICAL EXAM
[General Appearance - Well Developed] : well developed [Normal Appearance] : normal appearance [Well Groomed] : well groomed [General Appearance - Well Nourished] : well nourished [No Deformities] : no deformities [General Appearance - In No Acute Distress] : no acute distress [Heart Rate And Rhythm] : heart rate and rhythm were normal [Heart Sounds] : normal S1 and S2 [Murmurs] : no murmurs present [] : no respiratory distress [Respiration, Rhythm And Depth] : normal respiratory rhythm and effort [Exaggerated Use Of Accessory Muscles For Inspiration] : no accessory muscle use [Left Infraclavicular] : left infraclavicular area [Clean] : clean [Dry] : dry [Well-Healed] : well-healed [Abdomen Soft] : soft [Nail Clubbing] : no clubbing of the fingernails [Cyanosis, Localized] : no localized cyanosis

## 2023-08-03 NOTE — PROCEDURE
[Complete Heart Block] : complete heart block [See Device Printout] : See device printout [CRT-P] : Cardiac resynchronization therapy pacemaker [DDDR] : DDDR [Voltage: ___ volts] : Voltage was [unfilled] volts [Longevity: ___ months] : The estimated remaining battery life is [unfilled] months [Normal] : The battery status is normal. [Threshold Testing Performed] : Threshold testing was performed [Sensing Amplitude ___mv] : sensing amplitude was [unfilled] mv [Lead Imp:  ___ohms] : lead impedance was [unfilled] ohms [___V @] : [unfilled] V [___ ms] : [unfilled] ms [de-identified] : Medtronic [de-identified] : Percepta Quad Percepta CRT-P [de-identified] : [id number] [de-identified] : 1/30/18 [de-identified] : 69 [de-identified] : 1.4 years [de-identified] :  99.8%, effective 99.7% AP 83.7% 2 NSVT episodes, the longest 6 beats max V 167 bpm 8 AT/AF episodes, burden <0.1% Optivol below threshold

## 2023-08-03 NOTE — HISTORY OF PRESENT ILLNESS
[de-identified] : PCP: Zahraa (send copy of note and interrogation to him) [phone] Cardio: Dr. Foster  History a CRT-P for CHB presents for a routine device interrogation.  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

## 2023-08-03 NOTE — ASSESSMENT
[FreeTextEntry1] : CRT-P interrogation - Device interrogation normal. All parameters stable. Patient on remote and transmitting.  - 2 NSVT episodes. Continue to monitor.  - Continue remote monitoring q 3 months, FYI he is connected via yanelis on iPad  PAF - On Eliquis, no s/s bleeding reported.  - CBC, BMP yearly - Continue current meds  - RTO in 9 months

## 2023-09-25 ENCOUNTER — NON-APPOINTMENT (OUTPATIENT)
Age: 73
End: 2023-09-25

## 2023-09-25 ENCOUNTER — APPOINTMENT (OUTPATIENT)
Dept: CARDIOLOGY | Facility: CLINIC | Age: 73
End: 2023-09-25
Payer: MEDICARE

## 2023-09-26 PROCEDURE — 93296 REM INTERROG EVL PM/IDS: CPT

## 2023-09-26 PROCEDURE — 93294 REM INTERROG EVL PM/LDLS PM: CPT

## 2023-12-26 ENCOUNTER — APPOINTMENT (OUTPATIENT)
Dept: CARDIOLOGY | Facility: CLINIC | Age: 73
End: 2023-12-26
Payer: MEDICARE

## 2023-12-26 ENCOUNTER — NON-APPOINTMENT (OUTPATIENT)
Age: 73
End: 2023-12-26

## 2023-12-27 PROCEDURE — 93294 REM INTERROG EVL PM/LDLS PM: CPT

## 2023-12-27 PROCEDURE — 93296 REM INTERROG EVL PM/IDS: CPT

## 2024-03-07 ENCOUNTER — APPOINTMENT (OUTPATIENT)
Dept: ELECTROPHYSIOLOGY | Facility: CLINIC | Age: 74
End: 2024-03-07
Payer: MEDICARE

## 2024-03-07 VITALS
HEIGHT: 73 IN | WEIGHT: 209 LBS | BODY MASS INDEX: 27.7 KG/M2 | HEART RATE: 70 BPM | TEMPERATURE: 97.1 F | SYSTOLIC BLOOD PRESSURE: 108 MMHG | DIASTOLIC BLOOD PRESSURE: 66 MMHG

## 2024-03-07 VITALS
SYSTOLIC BLOOD PRESSURE: 108 MMHG | HEIGHT: 73 IN | HEART RATE: 70 BPM | RESPIRATION RATE: 18 BRPM | WEIGHT: 209 LBS | BODY MASS INDEX: 27.7 KG/M2 | DIASTOLIC BLOOD PRESSURE: 66 MMHG | TEMPERATURE: 97.1 F

## 2024-03-07 DIAGNOSIS — I48.92 UNSPECIFIED ATRIAL FLUTTER: ICD-10-CM

## 2024-03-07 DIAGNOSIS — Z45.018 ENCOUNTER FOR ADJUSTMENT AND MANAGEMENT OF OTHER PART OF CARDIAC PACEMAKER: ICD-10-CM

## 2024-03-07 DIAGNOSIS — I10 ESSENTIAL (PRIMARY) HYPERTENSION: ICD-10-CM

## 2024-03-07 DIAGNOSIS — I49.5 SICK SINUS SYNDROME: ICD-10-CM

## 2024-03-07 PROCEDURE — 93000 ELECTROCARDIOGRAM COMPLETE: CPT | Mod: 59

## 2024-03-07 PROCEDURE — 99214 OFFICE O/P EST MOD 30 MIN: CPT | Mod: 25

## 2024-03-07 PROCEDURE — 93281 PM DEVICE PROGR EVAL MULTI: CPT

## 2024-03-07 RX ORDER — METOPROLOL TARTRATE 25 MG/1
25 TABLET, FILM COATED ORAL
Refills: 0 | Status: ACTIVE | COMMUNITY

## 2024-03-07 RX ORDER — FENOFIBRIC ACID 135 MG/1
135 CAPSULE, DELAYED RELEASE ORAL
Refills: 0 | Status: ACTIVE | COMMUNITY

## 2024-03-07 RX ORDER — ROSUVASTATIN CALCIUM 10 MG/1
10 TABLET, FILM COATED ORAL
Refills: 0 | Status: ACTIVE | COMMUNITY

## 2024-03-07 RX ORDER — APIXABAN 5 MG/1
5 TABLET, FILM COATED ORAL
Refills: 0 | Status: ACTIVE | COMMUNITY
Start: 2017-11-06

## 2024-03-07 NOTE — PHYSICAL EXAM
[Normal Appearance] : normal appearance [General Appearance - In No Acute Distress] : no acute distress [Heart Rate And Rhythm] : heart rate and rhythm were normal [Heart Sounds] : normal S1 and S2 [] : no respiratory distress [Exaggerated Use Of Accessory Muscles For Inspiration] : no accessory muscle use [Right Infraclavicular] : right infraclavicular area [Clean] : clean [Dry] : dry [Well-Healed] : well-healed [Nail Clubbing] : no clubbing of the fingernails [Cyanosis, Localized] : no localized cyanosis [Petechial Hemorrhages (___cm)] : no petechial hemorrhages

## 2024-03-08 PROBLEM — I48.92 ATRIAL FLUTTER, PAROXYSMAL: Status: ACTIVE | Noted: 2020-02-18

## 2024-03-08 PROBLEM — I10 HIGH BLOOD PRESSURE: Status: ACTIVE | Noted: 2017-12-08

## 2024-03-08 NOTE — HISTORY OF PRESENT ILLNESS
[de-identified] : Cardio: Dr. Foster  73-year-old male with Hyperlipidemia, s/p Aortic valve replacement, Atrial fibrillation, s/p CRT-P for CHB in 2018, presents for a routine device interrogation.  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   The patient has a cough for the past week and is going to see his PCP this afternoon, but denies chest pain/discomfort, dyspnea, palpitations, dizziness, lightheadedness and syncope.

## 2024-03-08 NOTE — CARDIOLOGY SUMMARY
[de-identified] : 03/07/2024: A paced, BiV paced rhythm at 70 bpm 8/10/2022: A-paced, BIV-paced 76 bpm 3/16/22: A-paced, BiV-paced 85 bpm [de-identified] : Medtronic CRT-P 01/30/2018 [de-identified] : MELVIN 2019: LVEF 55-60%, porcine bioprosthesis in the aortic position.

## 2024-03-08 NOTE — REVIEW OF SYSTEMS
[Cough] : cough [Negative] : Psychiatric [Wheezing] : no wheezing [Coughing Up Blood] : no hemoptysis

## 2024-03-08 NOTE — PROCEDURE
[Complete Heart Block] : complete heart block [See Device Printout] : See device printout [CRT-P] : Cardiac resynchronization therapy pacemaker [DDDR] : DDDR [Longevity: ___ months] : The estimated remaining battery life is [unfilled] months [Normal] : The battery status is normal. [Threshold Testing Performed] : Threshold testing was performed [Sensing Amplitude ___mv] : sensing amplitude was [unfilled] mv [Lead Imp:  ___ohms] : lead impedance was [unfilled] ohms [___V @] : [unfilled] V [___ ms] : [unfilled] ms [Programmed for Longevity] : output reprogrammed for improved battery longevity [Counters Reset] : the counters were reset [de-identified] : Medtronic [de-identified] : Percepta Quad Percepta CRT-P [de-identified] : 01/30/2018 [de-identified] : [id number] [de-identified] :  bpm [de-identified] : 6 months [de-identified] :  99.6%, effective 99.5% AP 86.2% One NSVT episodes of 6 beats Optivol slightly elevated on 03/03/2024 and ongoing. The patient is enrolled on remote monitoring.

## 2024-03-08 NOTE — ASSESSMENT
[FreeTextEntry1] : 73-year-old male with Hyperlipidemia, s/p Aortic valve replacement, Atrial fibrillation, s/p CRT-P for CHB in 2018, presents for a routine device interrogation.   -CRT-P interrogation  99.6%, effective 99.5% AP 86.2%. Battery life: 6 month to RRT.  One NSVT episodes of 6 beats. The patient is asymptomatic. Continue Metoprolol tartrate 25 mg twice a day. Optivol slightly elevated on 03/03/2024 and ongoing. Advised the patient to monitor his weight, LE edema and symptoms of dyspnea (denies at this time, only cough). Follow-up with cardiologist recommended. The patient is enrolled on remote monitoring.  _PAF- CHADSVASc score of 3. On Eliquis.  No s/s bleeding reported.  - CBC, CMP ordered today.  -HTN-controlled Continue Metoprolol tartrate 25 mg twice a day.  - Follow-up in 4 months for discussion of gen change with Dr. Penaloza.   I have also advised the patient to go to the nearest emergency room if she experiences any chest pain, dyspnea, syncope, or has any other compelling symptoms.

## 2024-06-09 ENCOUNTER — OUTPATIENT (OUTPATIENT)
Dept: OUTPATIENT SERVICES | Facility: HOSPITAL | Age: 74
LOS: 1 days | End: 2024-06-09
Payer: MEDICARE

## 2024-06-09 DIAGNOSIS — I71.21 ANEURYSM OF THE ASCENDING AORTA, WITHOUT RUPTURE: ICD-10-CM

## 2024-06-09 DIAGNOSIS — Z95.2 PRESENCE OF PROSTHETIC HEART VALVE: Chronic | ICD-10-CM

## 2024-06-09 PROCEDURE — 71275 CT ANGIOGRAPHY CHEST: CPT | Mod: 26

## 2024-06-09 PROCEDURE — 71275 CT ANGIOGRAPHY CHEST: CPT

## 2024-06-10 DIAGNOSIS — I71.21 ANEURYSM OF THE ASCENDING AORTA, WITHOUT RUPTURE: ICD-10-CM

## 2024-06-14 ENCOUNTER — APPOINTMENT (OUTPATIENT)
Dept: ELECTROPHYSIOLOGY | Facility: CLINIC | Age: 74
End: 2024-06-14

## 2024-06-14 VITALS
HEART RATE: 84 BPM | BODY MASS INDEX: 29.16 KG/M2 | SYSTOLIC BLOOD PRESSURE: 127 MMHG | DIASTOLIC BLOOD PRESSURE: 78 MMHG | HEIGHT: 73 IN | TEMPERATURE: 98 F | WEIGHT: 220 LBS

## 2024-06-14 DIAGNOSIS — I44.2 ATRIOVENTRICULAR BLOCK, COMPLETE: ICD-10-CM

## 2024-06-14 DIAGNOSIS — I48.91 UNSPECIFIED ATRIAL FIBRILLATION: ICD-10-CM

## 2024-06-14 PROCEDURE — 93290 INTERROG DEV EVAL ICPMS IP: CPT

## 2024-06-14 PROCEDURE — 99205 OFFICE O/P NEW HI 60 MIN: CPT | Mod: 25

## 2024-06-14 PROCEDURE — 93284 PRGRMG EVAL IMPLANTABLE DFB: CPT

## 2024-06-14 PROCEDURE — G2211 COMPLEX E/M VISIT ADD ON: CPT

## 2024-06-14 NOTE — ADDENDUM
[FreeTextEntry1] : Leigha BOYD assisted in documentation on 06/14/2024   acting as a scribe for Dr. Jarred Penaloza.

## 2024-06-14 NOTE — PROCEDURE
[Complete Heart Block] : complete heart block [See Scanned Paceart Report] : See scanned paceart report [See Device Printout] : See device printout [CRT-P] : Cardiac resynchronization therapy pacemaker [DDDR] : DDDR [Threshold Testing Performed] : Threshold testing was performed [Atrial] : Atrial [Ventricular] : Ventricular [Programmed for Longevity] : output reprogrammed for improved battery longevity [Counters Reset] : the counters were reset [Asense-Vsense ___ %] : Asense-Vsense [unfilled]% [Asense-Vpace ___ %] : Asense-Vpace [unfilled]% [Apace-Vsense ___ %] : Apace-Vsense [unfilled]% [Apace-Vpace ___ %] : Apace-Vpace [unfilled]% [Voltage: ___ volts] : Voltage was [unfilled] volts [Magnet Rate: ___ Ppm] : magnet rate was [unfilled] Ppm [Longevity: ___ months] : The estimated remaining battery life is [unfilled] months [Normal] : The battery status is normal. [Sensing Amplitude ___mv] : sensing amplitude was [unfilled] mv [Lead Imp:  ___ohms] : lead impedance was [unfilled] ohms [___V @] : [unfilled] V [___ ms] : [unfilled] ms [de-identified] : Medtronic [de-identified] : Percepta Quad Percepta CRT-P [de-identified] : [id number] [de-identified] : 01/30/2018 [de-identified] :  bpm [de-identified] : 2 months [de-identified] :  99.7%, effective 99.6% AP 83.6% The patient is enrolled on remote monitoring.

## 2024-06-14 NOTE — ASSESSMENT
[FreeTextEntry1] : Complete Heart Block - Normal functioning BiV ICD.  - Patient will require generator change. - I have explained the risks and benefits of the procedure to the patient.  There is approximately 1% chance of any major cardiovascular complication to occur. Complications include, but are not limited to infection, bleeding, damage to the vessels, pneumothorax, stroke, death and heart attack.  The patient understands the risk and would like to proceed with the procedure. Patient indicated that all of his questions were answered to his satisfaction and verbalized understanding.   Atrial Fibrillation  - Patient had no AF recorded on his device since last visit.  - Continue Eliquis 5 mg Q12, no bleeding.

## 2024-06-14 NOTE — HISTORY OF PRESENT ILLNESS
[None] : The patient complains of no symptoms [Palpitations] : no palpitations [SOB] : no dyspnea [Syncope] : no syncope [ICD Shocks] : no recent ICD shocks [Erythema at Site] : no erythema at device site [Swelling at Site] : no swelling at device site [de-identified] : Remote added  73-year-old male with Hyperlipidemia, s/p Aortic valve replacement, Atrial fibrillation, s/p CRT-P for CHB in 2018, presents for a routine device interrogation. History a CRT-P for CHB presents for a routine device interrogation.   Hx of  Enterococcus Bacteremia 7/2019  - TTE negative for vegetation and was treated as endocarditis.  Seen by ID/ Dr. Gleser ,  on Amoxicillin po    06/14/2024: Patient comes for routine follow-up and denies any complaints. Patient has 2 months left to FRANKLYN.

## 2024-07-29 ENCOUNTER — APPOINTMENT (OUTPATIENT)
Dept: ELECTROPHYSIOLOGY | Facility: CLINIC | Age: 74
End: 2024-07-29
Payer: MEDICARE

## 2024-07-29 VITALS
DIASTOLIC BLOOD PRESSURE: 74 MMHG | HEART RATE: 91 BPM | WEIGHT: 218 LBS | HEIGHT: 73 IN | BODY MASS INDEX: 28.89 KG/M2 | TEMPERATURE: 98 F | SYSTOLIC BLOOD PRESSURE: 149 MMHG

## 2024-07-29 DIAGNOSIS — I44.2 ATRIOVENTRICULAR BLOCK, COMPLETE: ICD-10-CM

## 2024-07-29 DIAGNOSIS — Z45.018 ENCOUNTER FOR ADJUSTMENT AND MANAGEMENT OF OTHER PART OF CARDIAC PACEMAKER: ICD-10-CM

## 2024-07-29 DIAGNOSIS — Z95.0 PRESENCE OF CARDIAC PACEMAKER: ICD-10-CM

## 2024-07-29 DIAGNOSIS — I49.5 SICK SINUS SYNDROME: ICD-10-CM

## 2024-07-29 DIAGNOSIS — I48.0 PAROXYSMAL ATRIAL FIBRILLATION: ICD-10-CM

## 2024-07-29 PROCEDURE — 99215 OFFICE O/P EST HI 40 MIN: CPT | Mod: 25

## 2024-07-29 PROCEDURE — 93281 PM DEVICE PROGR EVAL MULTI: CPT

## 2024-07-30 NOTE — PROCEDURE
[Complete Heart Block] : complete heart block [See Scanned Paceart Report] : See scanned paceart report [See Device Printout] : See device printout [CRT-P] : Cardiac resynchronization therapy pacemaker [DDDR] : DDDR [Voltage: ___ volts] : Voltage was [unfilled] volts [Magnet Rate: ___ Ppm] : magnet rate was [unfilled] Ppm [Longevity: ___ months] : The estimated remaining battery life is [unfilled] months [Normal] : The battery status is normal. [Threshold Testing Performed] : Threshold testing was performed [Atrial] : Atrial [Ventricular] : Ventricular [Sensing Amplitude ___mv] : sensing amplitude was [unfilled] mv [Lead Imp:  ___ohms] : lead impedance was [unfilled] ohms [___V @] : [unfilled] V [___ ms] : [unfilled] ms [Programmed for Longevity] : output reprogrammed for improved battery longevity [Counters Reset] : the counters were reset [Asense-Vsense ___ %] : Asense-Vsense [unfilled]% [Asense-Vpace ___ %] : Asense-Vpace [unfilled]% [Apace-Vsense ___ %] : Apace-Vsense [unfilled]% [Apace-Vpace ___ %] : Apace-Vpace [unfilled]% [de-identified] : Medtronic [de-identified] : Percepta Quad Percepta CRT-P [de-identified] : [id number] [de-identified] : 01/30/2018 [de-identified] :  bpm [de-identified] : <1 month [de-identified] :  99.7%, effective 99.6% AP 83.6% The patient is enrolled on remote monitoring.

## 2024-07-30 NOTE — ASSESSMENT
[FreeTextEntry1] : Complete Heart Block - Normal functioning BiV PPM - Patient will require generator change. - I reviewed the risks and benefits of the procedure to the patient.  There is approximately 1% chance of any major cardiovascular complication to occur. Complications include, but are not limited to infection, bleeding, damage to the vessels, pneumothorax, stroke, death and heart attack.  The patient understands the risk and would like to proceed with the procedure. Patient indicated that all of his questions were answered to his satisfaction and verbalized understanding.   Device has < 1 month of battery life Will schedule in a month for the procedure Office staff will call him for the date and when to do PAST  Atrial Fibrillation  - Patient had no AF recorded on his device since last visit.  - Continue Eliquis 5 mg Q12, no bleeding.   RTO in 2 months

## 2024-07-30 NOTE — HISTORY OF PRESENT ILLNESS
[None] : The patient complains of no symptoms [Palpitations] : no palpitations [SOB] : no dyspnea [Syncope] : no syncope [ICD Shocks] : no recent ICD shocks [Erythema at Site] : no erythema at device site [Swelling at Site] : no swelling at device site [de-identified] : Remote added Cardiologist: Dr. Bay 74-year-old male with Hyperlipidemia, s/p Aortic valve replacement, Atrial fibrillation, s/p CRT-P for CHB in 2018, presents for a routine device interrogation. History a CRT-P for CHB presents for a routine device interrogation.   Hx of  Enterococcus Bacteremia 7/2019  - TTE negative for vegetation and was treated as endocarditis.  Seen by ID/ Dr. Gleser ,  on Amoxicillin po    06/14/2024: Patient comes for routine follow-up and denies any complaints. Patient has 2 months left to FRANKLYN.

## 2024-07-30 NOTE — HISTORY OF PRESENT ILLNESS
[None] : The patient complains of no symptoms [Palpitations] : no palpitations [SOB] : no dyspnea [Syncope] : no syncope [ICD Shocks] : no recent ICD shocks [Erythema at Site] : no erythema at device site [Swelling at Site] : no swelling at device site [de-identified] : Remote added Cardiologist: Dr. Bay 74-year-old male with Hyperlipidemia, s/p Aortic valve replacement, Atrial fibrillation, s/p CRT-P for CHB in 2018, presents for a routine device interrogation. History a CRT-P for CHB presents for a routine device interrogation.   Hx of  Enterococcus Bacteremia 7/2019  - TTE negative for vegetation and was treated as endocarditis.  Seen by ID/ Dr. Gleser ,  on Amoxicillin po    06/14/2024: Patient comes for routine follow-up and denies any complaints. Patient has 2 months left to FRANKLYN.

## 2024-07-30 NOTE — PROCEDURE
[Complete Heart Block] : complete heart block [See Scanned Paceart Report] : See scanned paceart report [See Device Printout] : See device printout [CRT-P] : Cardiac resynchronization therapy pacemaker [DDDR] : DDDR [Voltage: ___ volts] : Voltage was [unfilled] volts [Magnet Rate: ___ Ppm] : magnet rate was [unfilled] Ppm [Longevity: ___ months] : The estimated remaining battery life is [unfilled] months [Normal] : The battery status is normal. [Threshold Testing Performed] : Threshold testing was performed [Atrial] : Atrial [Ventricular] : Ventricular [Sensing Amplitude ___mv] : sensing amplitude was [unfilled] mv [Lead Imp:  ___ohms] : lead impedance was [unfilled] ohms [___V @] : [unfilled] V [___ ms] : [unfilled] ms [Programmed for Longevity] : output reprogrammed for improved battery longevity [Counters Reset] : the counters were reset [Asense-Vsense ___ %] : Asense-Vsense [unfilled]% [Asense-Vpace ___ %] : Asense-Vpace [unfilled]% [Apace-Vsense ___ %] : Apace-Vsense [unfilled]% [Apace-Vpace ___ %] : Apace-Vpace [unfilled]% [de-identified] : Medtronic [de-identified] : Percepta Quad Percepta CRT-P [de-identified] : [id number] [de-identified] : 01/30/2018 [de-identified] :  bpm [de-identified] : <1 month [de-identified] :  99.7%, effective 99.6% AP 83.6% The patient is enrolled on remote monitoring.

## 2024-08-28 ENCOUNTER — OUTPATIENT (OUTPATIENT)
Dept: OUTPATIENT SERVICES | Facility: HOSPITAL | Age: 74
LOS: 1 days | End: 2024-08-28
Payer: MEDICARE

## 2024-08-28 DIAGNOSIS — Z95.2 PRESENCE OF PROSTHETIC HEART VALVE: Chronic | ICD-10-CM

## 2024-08-28 PROCEDURE — 93005 ELECTROCARDIOGRAM TRACING: CPT

## 2024-08-28 PROCEDURE — 99214 OFFICE O/P EST MOD 30 MIN: CPT | Mod: 25

## 2024-09-03 DIAGNOSIS — Z01.818 ENCOUNTER FOR OTHER PREPROCEDURAL EXAMINATION: ICD-10-CM

## 2024-09-03 DIAGNOSIS — T82.111D BREAKDOWN (MECHANICAL) OF CARDIAC PULSE GENERATOR (BATTERY), SUBSEQUENT ENCOUNTER: ICD-10-CM

## 2024-09-04 DIAGNOSIS — Z01.818 ENCOUNTER FOR OTHER PREPROCEDURAL EXAMINATION: ICD-10-CM

## 2024-09-12 ENCOUNTER — OUTPATIENT (OUTPATIENT)
Dept: OUTPATIENT SERVICES | Facility: HOSPITAL | Age: 74
LOS: 1 days | End: 2024-09-12
Payer: MEDICARE

## 2024-09-12 VITALS
DIASTOLIC BLOOD PRESSURE: 80 MMHG | RESPIRATION RATE: 18 BRPM | TEMPERATURE: 96 F | SYSTOLIC BLOOD PRESSURE: 147 MMHG | OXYGEN SATURATION: 100 % | HEIGHT: 73 IN | WEIGHT: 214.07 LBS | HEART RATE: 78 BPM

## 2024-09-12 DIAGNOSIS — Z01.818 ENCOUNTER FOR OTHER PREPROCEDURAL EXAMINATION: ICD-10-CM

## 2024-09-12 DIAGNOSIS — T82.111D BREAKDOWN (MECHANICAL) OF CARDIAC PULSE GENERATOR (BATTERY), SUBSEQUENT ENCOUNTER: ICD-10-CM

## 2024-09-12 DIAGNOSIS — Z98.890 OTHER SPECIFIED POSTPROCEDURAL STATES: Chronic | ICD-10-CM

## 2024-09-12 DIAGNOSIS — Z95.2 PRESENCE OF PROSTHETIC HEART VALVE: Chronic | ICD-10-CM

## 2024-09-12 LAB
ALBUMIN SERPL ELPH-MCNC: 4.2 G/DL — SIGNIFICANT CHANGE UP (ref 3.5–5.2)
ALP SERPL-CCNC: 54 U/L — SIGNIFICANT CHANGE UP (ref 30–115)
ALT FLD-CCNC: 21 U/L — SIGNIFICANT CHANGE UP (ref 0–41)
ANION GAP SERPL CALC-SCNC: 13 MMOL/L — SIGNIFICANT CHANGE UP (ref 7–14)
APPEARANCE UR: CLEAR — SIGNIFICANT CHANGE UP
APTT BLD: 38.6 SEC — SIGNIFICANT CHANGE UP (ref 27–39.2)
AST SERPL-CCNC: 27 U/L — SIGNIFICANT CHANGE UP (ref 0–41)
BASOPHILS # BLD AUTO: 0.04 K/UL — SIGNIFICANT CHANGE UP (ref 0–0.2)
BASOPHILS NFR BLD AUTO: 0.7 % — SIGNIFICANT CHANGE UP (ref 0–1)
BILIRUB SERPL-MCNC: 0.6 MG/DL — SIGNIFICANT CHANGE UP (ref 0.2–1.2)
BILIRUB UR-MCNC: NEGATIVE — SIGNIFICANT CHANGE UP
BUN SERPL-MCNC: 16 MG/DL — SIGNIFICANT CHANGE UP (ref 10–20)
CALCIUM SERPL-MCNC: 9.6 MG/DL — SIGNIFICANT CHANGE UP (ref 8.4–10.5)
CHLORIDE SERPL-SCNC: 105 MMOL/L — SIGNIFICANT CHANGE UP (ref 98–110)
CO2 SERPL-SCNC: 22 MMOL/L — SIGNIFICANT CHANGE UP (ref 17–32)
COLOR SPEC: YELLOW — SIGNIFICANT CHANGE UP
CREAT SERPL-MCNC: 0.9 MG/DL — SIGNIFICANT CHANGE UP (ref 0.7–1.5)
DIFF PNL FLD: NEGATIVE — SIGNIFICANT CHANGE UP
EGFR: 89 ML/MIN/1.73M2 — SIGNIFICANT CHANGE UP
EOSINOPHIL # BLD AUTO: 0.33 K/UL — SIGNIFICANT CHANGE UP (ref 0–0.7)
EOSINOPHIL NFR BLD AUTO: 5.9 % — SIGNIFICANT CHANGE UP (ref 0–8)
GLUCOSE SERPL-MCNC: 212 MG/DL — HIGH (ref 70–99)
GLUCOSE UR QL: >=1000 MG/DL
HCT VFR BLD CALC: 42.6 % — SIGNIFICANT CHANGE UP (ref 42–52)
HGB BLD-MCNC: 14.2 G/DL — SIGNIFICANT CHANGE UP (ref 14–18)
IMM GRANULOCYTES NFR BLD AUTO: 0.4 % — HIGH (ref 0.1–0.3)
INR BLD: 1.22 RATIO — SIGNIFICANT CHANGE UP (ref 0.65–1.3)
KETONES UR-MCNC: NEGATIVE MG/DL — SIGNIFICANT CHANGE UP
LEUKOCYTE ESTERASE UR-ACNC: NEGATIVE — SIGNIFICANT CHANGE UP
LYMPHOCYTES # BLD AUTO: 1.1 K/UL — LOW (ref 1.2–3.4)
LYMPHOCYTES # BLD AUTO: 19.7 % — LOW (ref 20.5–51.1)
MCHC RBC-ENTMCNC: 30.6 PG — SIGNIFICANT CHANGE UP (ref 27–31)
MCHC RBC-ENTMCNC: 33.3 G/DL — SIGNIFICANT CHANGE UP (ref 32–37)
MCV RBC AUTO: 91.8 FL — SIGNIFICANT CHANGE UP (ref 80–94)
MONOCYTES # BLD AUTO: 0.62 K/UL — HIGH (ref 0.1–0.6)
MONOCYTES NFR BLD AUTO: 11.1 % — HIGH (ref 1.7–9.3)
MRSA PCR RESULT.: NEGATIVE — SIGNIFICANT CHANGE UP
NEUTROPHILS # BLD AUTO: 3.46 K/UL — SIGNIFICANT CHANGE UP (ref 1.4–6.5)
NEUTROPHILS NFR BLD AUTO: 62.2 % — SIGNIFICANT CHANGE UP (ref 42.2–75.2)
NITRITE UR-MCNC: NEGATIVE — SIGNIFICANT CHANGE UP
NRBC # BLD: 0 /100 WBCS — SIGNIFICANT CHANGE UP (ref 0–0)
PH UR: 5.5 — SIGNIFICANT CHANGE UP (ref 5–8)
PLATELET # BLD AUTO: 135 K/UL — SIGNIFICANT CHANGE UP (ref 130–400)
PMV BLD: 11.7 FL — HIGH (ref 7.4–10.4)
POTASSIUM SERPL-MCNC: 4.4 MMOL/L — SIGNIFICANT CHANGE UP (ref 3.5–5)
POTASSIUM SERPL-SCNC: 4.4 MMOL/L — SIGNIFICANT CHANGE UP (ref 3.5–5)
PROT SERPL-MCNC: 6.5 G/DL — SIGNIFICANT CHANGE UP (ref 6–8)
PROT UR-MCNC: NEGATIVE MG/DL — SIGNIFICANT CHANGE UP
PROTHROM AB SERPL-ACNC: 13.9 SEC — HIGH (ref 9.95–12.87)
RBC # BLD: 4.64 M/UL — LOW (ref 4.7–6.1)
RBC # FLD: 13.4 % — SIGNIFICANT CHANGE UP (ref 11.5–14.5)
SODIUM SERPL-SCNC: 140 MMOL/L — SIGNIFICANT CHANGE UP (ref 135–146)
SP GR SPEC: 1.03 — SIGNIFICANT CHANGE UP (ref 1–1.03)
UROBILINOGEN FLD QL: 0.2 MG/DL — SIGNIFICANT CHANGE UP (ref 0.2–1)
WBC # BLD: 5.57 K/UL — SIGNIFICANT CHANGE UP (ref 4.8–10.8)
WBC # FLD AUTO: 5.57 K/UL — SIGNIFICANT CHANGE UP (ref 4.8–10.8)

## 2024-09-12 PROCEDURE — 86900 BLOOD TYPING SEROLOGIC ABO: CPT

## 2024-09-12 PROCEDURE — 86901 BLOOD TYPING SEROLOGIC RH(D): CPT

## 2024-09-12 PROCEDURE — 85025 COMPLETE CBC W/AUTO DIFF WBC: CPT

## 2024-09-12 PROCEDURE — 87640 STAPH A DNA AMP PROBE: CPT

## 2024-09-12 PROCEDURE — 86850 RBC ANTIBODY SCREEN: CPT

## 2024-09-12 PROCEDURE — 36415 COLL VENOUS BLD VENIPUNCTURE: CPT

## 2024-09-12 PROCEDURE — 99214 OFFICE O/P EST MOD 30 MIN: CPT | Mod: 25

## 2024-09-12 PROCEDURE — 85730 THROMBOPLASTIN TIME PARTIAL: CPT

## 2024-09-12 PROCEDURE — 81003 URINALYSIS AUTO W/O SCOPE: CPT

## 2024-09-12 PROCEDURE — 80053 COMPREHEN METABOLIC PANEL: CPT

## 2024-09-12 PROCEDURE — 87086 URINE CULTURE/COLONY COUNT: CPT

## 2024-09-12 PROCEDURE — 85610 PROTHROMBIN TIME: CPT

## 2024-09-12 PROCEDURE — 87641 MR-STAPH DNA AMP PROBE: CPT

## 2024-09-12 NOTE — H&P PST ADULT - REASON FOR ADMISSION
Patient is a   74 year old  male presenting to PAST in preparation for  PPM CRT-P GEN CHANGE   on 9/25/24   under LSB  anesthesia by Dr. Penaloza .  The patient has a hx of AVR, aortic aneurysm and PPM .  He has a hx of atrial fibrillation however on recent interrogation, no recent Afib.   June 2024, annual CT scan performed and shows stable postoperative appearance of thoracic aorta, unchanged in size and configuration.  He has been treated in the past for endocarditis. Currently taking ASA 81mg daily and Eliquis BID; he is aware to hold Eliquis x24 hours pre procedure.  He will hold metformin after PM dose on 9/24. He will continue on baby ASA.   Reviewed NPO and chlorhexidine wash and verbalized understanding; written instructions provided.  EKG from 8/2024 and labs sent today.

## 2024-09-12 NOTE — H&P PST ADULT - NSICDXPASTSURGICALHX_GEN_ALL_CORE_FT
PAST SURGICAL HISTORY:  H/O aortic aneurysm repair     S/P AVR (aortic valve replacement) complicated by aortic aneurysm and infective endocarditis

## 2024-09-12 NOTE — H&P PST ADULT - HISTORY OF PRESENT ILLNESS
PATIENT/GUARDIAN CURRENTLY DENIES CHEST PAIN  SHORTNESS OF BREATH  PALPITATIONS,  DYSURIA, OR UPPER RESPIRATORY INFECTION IN PAST 2 WEEKS  denies travel outside the [place] in the past 30 days    Anesthesia Alert  NO--Difficult Airway; mallinpati class II  NO--History of neck surgery or radiation  NO--Limited ROM of neck  NO--History of Malignant hyperthermia  NO--No personal or family history of Pseudocholinesterase deficiency.  NO--Prior Anesthesia Complication  NO--Latex Allergy  NO--Loose teeth (denture, upper)   NO--History of Rheumatoid Arthritis  YES-Bleeding risk; on ASA, eliquis  NO--KEVIN  NO--Other_____    PT/GUARDIAN DENIES ANY RASHES, ABRASION, OR OPEN WOUNDS OR CUTS    AS PER THE PT/GUARDIAN, THIS IS HIS/HER COMPLETE MEDICAL AND SURGICAL HX, INCLUDING MEDICATIONS PRESCRIBED AND OVER THE COUNTER    Patient/guardian understands the instructions and was given the opportunity to ask questions and have them answered.        Duke Activity Status Index (DASI):   Duke Activity Status Index (DASI) from [employer]  on 9/12/2024  ** All calculations should be rechecked by clinician prior to use **    RESULT SUMMARY:  36.7 points  The higher the score (maximum 58.2), the higher the functional status.    7.25 METs        INPUTS:  Take care of self —> 2.75 = Yes  Walk indoors —> 1.75 = Yes  Walk 1&ndash;2 blocks on level ground —> 2.75 = Yes  Climb a flight of stairs or walk up a hill —> 5.5 = Yes  Run a short distance —> 0 = No  Do light work around the house —> 2.7 = Yes  Do moderate work around the house —> 3.5 = Yes  Do heavy work around the house —> 8 = Yes  Do yardwork —> 4.5 = Yes  Have sexual relations —> 5.25 = Yes  Participate in moderate recreational activities —> 0 = No  Participate in strenuous sports —> 0 = No  -----------------------        Revised Cardiac Risk Index for Pre-Operative Risk  Revised Cardiac Risk Index for Pre-Operative Risk from [employer]  on 9/12/2024  ** All calculations should be rechecked by clinician prior to use **    RESULT SUMMARY:  0 points  Class I Risk    3.9 %  30-day risk of death, MI, or cardiac arrest    From [employer] 2017. These numbers are higher than those from the original study (Fredy 1999). See Evidence for details.      INPUTS:  Elevated-risk surgery —> 0 = No  History of ischemic heart disease —> 0 = No  History of congestive heart failure —> 0 = No  History of cerebrovascular disease —> 0 = No  Pre-operative treatment with insulin —> 0 = No  Pre-operative creatinine >2 mg/dL / 176.8 µmol/L —> 0 = No

## 2024-09-12 NOTE — H&P PST ADULT - NSICDXPASTMEDICALHX_GEN_ALL_CORE_FT
PAST MEDICAL HISTORY:  Atrial fibrillation     DM (diabetes mellitus)     Dyslipidemia     H/O endocarditis     H/O: osteoarthritis     History of BPH     Hypertension     Infective endocarditis

## 2024-09-13 DIAGNOSIS — T82.111D BREAKDOWN (MECHANICAL) OF CARDIAC PULSE GENERATOR (BATTERY), SUBSEQUENT ENCOUNTER: ICD-10-CM

## 2024-09-13 DIAGNOSIS — Z01.818 ENCOUNTER FOR OTHER PREPROCEDURAL EXAMINATION: ICD-10-CM

## 2024-09-14 LAB
CULTURE RESULTS: NO GROWTH — SIGNIFICANT CHANGE UP
SPECIMEN SOURCE: SIGNIFICANT CHANGE UP

## 2024-09-26 ENCOUNTER — APPOINTMENT (OUTPATIENT)
Dept: ELECTROPHYSIOLOGY | Facility: HOSPITAL | Age: 74
End: 2024-09-26

## 2024-09-26 ENCOUNTER — TRANSCRIPTION ENCOUNTER (OUTPATIENT)
Age: 74
End: 2024-09-26

## 2024-09-26 ENCOUNTER — OUTPATIENT (OUTPATIENT)
Dept: OUTPATIENT SERVICES | Facility: HOSPITAL | Age: 74
LOS: 1 days | Discharge: HOME | End: 2024-09-26
Payer: MEDICARE

## 2024-09-26 VITALS
WEIGHT: 214.95 LBS | HEART RATE: 71 BPM | SYSTOLIC BLOOD PRESSURE: 139 MMHG | TEMPERATURE: 99 F | OXYGEN SATURATION: 96 % | RESPIRATION RATE: 18 BRPM | HEIGHT: 73 IN | DIASTOLIC BLOOD PRESSURE: 67 MMHG

## 2024-09-26 VITALS
DIASTOLIC BLOOD PRESSURE: 68 MMHG | RESPIRATION RATE: 18 BRPM | SYSTOLIC BLOOD PRESSURE: 105 MMHG | OXYGEN SATURATION: 94 % | HEART RATE: 76 BPM

## 2024-09-26 DIAGNOSIS — Z98.890 OTHER SPECIFIED POSTPROCEDURAL STATES: Chronic | ICD-10-CM

## 2024-09-26 DIAGNOSIS — T82.111A BREAKDOWN (MECHANICAL) OF CARDIAC PULSE GENERATOR (BATTERY), INITIAL ENCOUNTER: ICD-10-CM

## 2024-09-26 DIAGNOSIS — Z95.2 PRESENCE OF PROSTHETIC HEART VALVE: Chronic | ICD-10-CM

## 2024-09-26 LAB
GLUCOSE BLDC GLUCOMTR-MCNC: 138 MG/DL — HIGH (ref 70–99)
GLUCOSE BLDC GLUCOMTR-MCNC: 96 MG/DL — SIGNIFICANT CHANGE UP (ref 70–99)

## 2024-09-26 PROCEDURE — ZZZZZ: CPT

## 2024-09-26 PROCEDURE — C1889: CPT

## 2024-09-26 PROCEDURE — 82962 GLUCOSE BLOOD TEST: CPT

## 2024-09-26 PROCEDURE — C2621: CPT

## 2024-09-26 PROCEDURE — 33229 REMV&REPLC PM GEN MULT LEADS: CPT

## 2024-09-26 RX ORDER — CEFAZOLIN SODIUM 2 G/100ML
2000 INJECTION, SOLUTION INTRAVENOUS ONCE
Refills: 0 | Status: DISCONTINUED | OUTPATIENT
Start: 2024-09-26 | End: 2024-09-26

## 2024-09-26 RX ORDER — CEFAZOLIN SODIUM 2 G/100ML
1000 INJECTION, SOLUTION INTRAVENOUS ONCE
Refills: 0 | Status: DISCONTINUED | OUTPATIENT
Start: 2024-09-26 | End: 2024-09-26

## 2024-09-26 RX ORDER — CEPHALEXIN 500 MG
1 CAPSULE ORAL
Qty: 14 | Refills: 0
Start: 2024-09-26 | End: 2024-10-02

## 2024-09-26 NOTE — ASU DISCHARGE PLAN (ADULT/PEDIATRIC) - ASU DC SPECIAL INSTRUCTIONSFT
No shower, bathtub, no wetting device site for 5 days.  Remove dressing tomorrow    Can take a shower on _ Tue _ , , leave Steri-strips in place  Keflex 500mg twice daily for 7 day    Follow up in EP office for wound check in 1 month

## 2024-09-26 NOTE — PROGRESS NOTE ADULT - SUBJECTIVE AND OBJECTIVE BOX
PREOPERATIVE DAY OF PROCEDURE EVALUATION:  I have personally seen and examined the patient.  I agree with the history and physical which I have reviewed and noted any changes below.    I discussed the risks, benefits and alternatives to pulse generator replacement. All questions were answered. Patient is agreeable to proceed. Consent obtained and placed in the chart.    Alfonso Dorsey MD  Cardiac Electrophysiology  09-26-24 @ 15:06    
Brief post-procedure note    Procedure performed: CRT-P generator replacement    Pre-operative diagnosis: CHB, FRANKLYN    Post-operative diagnosis: CHB, JOSEPH    INDICATION:  Complete heart block, CMP    ANESTHESIA TYPE:  [  ] General Anesthesia  [ x ] Sedation  [ X ] Local/Regional    ESTIMATED BLOOD LOSS:     10 mL    CONDITION  [  ] Critical  [  ] Serious  [  ]Fair  [ X ]Good    ACCESS:  Right pectoral area    IMPLANT:   Successful CRT-P generator replacement  A Tyrex pouch was used  See procedure report for thresholds and parameters.    SPECIMENS REMOVED (IF APPLICABLE): N/A    HEMOSTASIS:  Pressure dressing    COMPLICATIONS: No immediate complications    PLAN:  D/C home today after a short recovery  Resume Eliquis 5 mg q12h in 72 hours  Resume home meds  Antibiotics therapy as prescribed for 7 days  Follow-up in about 1 month with Dr. Penaloza  Patient enrolled in the remote monitoring device program  Check wound per protocol    Alfonso Dorsey MD  Cardiac Electrophysiology
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
## 2024-10-01 DIAGNOSIS — T82.111A BREAKDOWN (MECHANICAL) OF CARDIAC PULSE GENERATOR (BATTERY), INITIAL ENCOUNTER: ICD-10-CM

## 2024-10-02 PROBLEM — Z86.79 PERSONAL HISTORY OF OTHER DISEASES OF THE CIRCULATORY SYSTEM: Chronic | Status: ACTIVE | Noted: 2024-09-12

## 2024-10-02 PROBLEM — E11.9 TYPE 2 DIABETES MELLITUS WITHOUT COMPLICATIONS: Chronic | Status: ACTIVE | Noted: 2024-09-12

## 2024-10-08 ENCOUNTER — OUTPATIENT (OUTPATIENT)
Dept: OUTPATIENT SERVICES | Facility: HOSPITAL | Age: 74
LOS: 1 days | End: 2024-10-08
Payer: MEDICARE

## 2024-10-08 ENCOUNTER — OUTPATIENT (OUTPATIENT)
Dept: OUTPATIENT SERVICES | Facility: HOSPITAL | Age: 74
LOS: 1 days | Discharge: HOME | End: 2024-10-08
Payer: MEDICARE

## 2024-10-08 VITALS
DIASTOLIC BLOOD PRESSURE: 76 MMHG | OXYGEN SATURATION: 96 % | RESPIRATION RATE: 15 BRPM | HEIGHT: 73 IN | SYSTOLIC BLOOD PRESSURE: 134 MMHG | HEART RATE: 75 BPM | WEIGHT: 214.95 LBS | TEMPERATURE: 98 F

## 2024-10-08 VITALS — DIASTOLIC BLOOD PRESSURE: 71 MMHG | RESPIRATION RATE: 17 BRPM | HEART RATE: 71 BPM | SYSTOLIC BLOOD PRESSURE: 119 MMHG

## 2024-10-08 DIAGNOSIS — R06.02 SHORTNESS OF BREATH: ICD-10-CM

## 2024-10-08 DIAGNOSIS — Z95.2 PRESENCE OF PROSTHETIC HEART VALVE: Chronic | ICD-10-CM

## 2024-10-08 DIAGNOSIS — Z98.890 OTHER SPECIFIED POSTPROCEDURAL STATES: Chronic | ICD-10-CM

## 2024-10-08 LAB — GLUCOSE BLDC GLUCOMTR-MCNC: 101 MG/DL — HIGH (ref 70–99)

## 2024-10-08 PROCEDURE — 71046 X-RAY EXAM CHEST 2 VIEWS: CPT

## 2024-10-08 PROCEDURE — V2632: CPT

## 2024-10-08 PROCEDURE — 82962 GLUCOSE BLOOD TEST: CPT

## 2024-10-08 PROCEDURE — 71046 X-RAY EXAM CHEST 2 VIEWS: CPT | Mod: 26

## 2024-10-08 RX ORDER — METFORMIN HYDROCHLORIDE 850 MG/1
25 TABLET, FILM COATED ORAL
Refills: 0 | DISCHARGE

## 2024-10-08 NOTE — ASU PREOP CHECKLIST - HAIR REMOVAL
Patient called  in stating he went to his pharmacy to  diabetes medication he stated he is supposed to be getting a pill form and he stated they told him his insurance didn't cover Mary Lou Oconnell and he is wanting to know if a alternate could be called in.       Please advise hair removal not indicated

## 2024-10-08 NOTE — ASU PATIENT PROFILE, ADULT - AS SC BRADEN MOBILITY
Burton Wilkinson   [id number]   [facility] Same Day Surgery Update H & P     Nurse Practitioner Pre-operative History and Physical  Last H & P within the last 30 days. Metastatic Breast CA  Gamma Knife Radiosurgery     HISTORY OF PRESENT ILLNESS:    This is a 65-year-old woman with triple-negative breast cancer status post chemotherapy, radiation, and mastectomy with last therapy in 2014. The patient recently presented with nausea, right upper quadrant pain, and dizziness. Pan CT scan showed lung, liver, and T3 spine metastases. Brain MRI scan revealed  a right frontal metastasis.     Past Medical History:   Diagnosis Date    Breast cancer ([facility] [address]     Cancer ([facility] [address]     Bilateral Breast    Chemotherapy-induced neuropathy (HCC)     fingers and toes    Ductal carcinoma in situ (DCIS) of right breast     History of external beam radiation therapy 2015    Right Breast [facility]    History of external beam radiation therapy 2002    [facility]    Lymphedema of right upper extremity     Pedal edema      Past Surgical History:   Procedure Laterality Date    BREAST BIOPSY Left 2002    BREAST BIOPSY Right 2014    BREAST LUMPECTOMY Left 2002    BREAST LUMPECTOMY Right 2014    Right Lumpectomy with [place] Node Bx    BREAST SURGERY Left 2011    Mastectomy with [place] Bx and Reconstruction    IR BIOPSY LIVER PERCUTANEOUS  7/18/2022    IR BIOPSY LIVER PERCUTANEOUS 7/18/2022 [facility] SPECIAL PROCEDURES    TUNNELED VENOUS PORT PLACEMENT       Allergies   Allergen Reactions    Penicillins     Tape [Adhesive Tape] Rash     Current Facility-Administered Medications: bupivacaine (PF) (MARCAINE) 0.5 % injection 150 mg, 30 mL, IntraDERmal, Once  0.9 % sodium chloride bolus, 500 mL, IntraVENous, Once  lidocaine PF 1 % injection 30 mL, 30 mL, IntraDERmal, Once  ondansetron (ZOFRAN) injection 4 mg, 4 mg, IntraVENous, Once  dexamethasone (DECADRON) injection 4 mg, 4 mg, IntraVENous, Once  dexamethasone (DECADRON) injection 4 mg, 4 mg, IntraDERmal, Once  neomycin-bacitracin-polymyxin (NEOSPORIN) ointment, , Topical, Once  sodium bicarbonate 4.2 % injection 1.5 mEq, 1.5 mEq, IntraDERmal, Once  LORazepam (ATIVAN) tablet 1 mg, 1 mg, Oral, Q4H PRN    ROS:   Constitutional- No weight loss or fevers   Eyes- No diplopia. No photophobia. Ears/nose/throat- No dysphagia. No Dysarthria   Cardiovascular- +BLE edema  Respiratory- +chronic SOB  Gastrointestinal- No Abdominal pain. No Vomiting. Genitourinary- No incontinence. No urinary retention   Musculoskeletal- +right rib pain  Skin- No rash. No easy bruising. Psychiatric- No depression. No anxiety   Endocrine- No diabetes. No thyroid issues. Hematologic- No bleeding difficulty. No fatigue   Neurologic- No weakness. No Headache. Physical Exam  General: Alert and oriented x4, she is anxious, no acute distress     Heart: Regular rhythm, she is mildly tachycardic with a rate of 109 on assessment, no murmur. Bilateral lower extremity edema present. Lungs: No increased work of breathing, good air exchange. She is 98% on room air.       Abdomen: soft, non-distended, non-tender, no rebound tenderness or guarding, normal active bowel sounds and no masses palpated    Vitals:    08/01/22 0621   Temp: 96.8 °F (36 °C)         CBC:   Lab Results   Component Value Date/Time    WBC 9.6 07/01/2022 09:41 AM    RBC 5.03 07/01/2022 09:41 AM    HGB 14.9 07/01/2022 09:41 AM    HCT 43.4 07/01/2022 09:41 AM    MCV 86.3 07/01/2022 09:41 AM    MCH 29.6 07/01/2022 09:41 AM    MCHC 34.3 07/01/2022 09:41 AM    RDW 15.8 07/01/2022 09:41 AM     07/01/2022 09:41 AM    MPV 10.6 07/01/2022 09:41 AM     CMP:    Lab Results   Component Value Date/Time     07/01/2022 09:41 AM    K 3.9 07/01/2022 09:41 AM    CL 95 07/01/2022 09:41 AM    CO2 26 07/01/2022 09:41 AM    BUN 13 07/01/2022 09:41 AM    CREATININE 1.3 07/19/2022 08:55 AM    CREATININE 0.9 07/01/2022 09:41 AM    GFRAA 50 07/19/2022 08:55 AM    LABGLOM 41 07/19/2022 08:55 AM    GLUCOSE 95 07/01/2022 09:41 AM    PROT 8.0 07/01/2022 09:41 AM    PROT 7.8 09/19/2011 11:33 AM    LABALBU 3.3 07/01/2022 09:41 AM    CALCIUM 9.8 07/01/2022 09:41 AM    BILITOT 1.4 07/01/2022 09:41 AM    ALKPHOS 937 07/01/2022 09:41 AM     07/01/2022 09:41 AM     07/01/2022 09:41 AM        Current Facility-Administered Medications: bupivacaine (PF) (MARCAINE) 0.5 % injection 150 mg, 30 mL, IntraDERmal, Once  0.9 % sodium chloride bolus, 500 mL, IntraVENous, Once  lidocaine PF 1 % injection 30 mL, 30 mL, IntraDERmal, Once  ondansetron (ZOFRAN) injection 4 mg, 4 mg, IntraVENous, Once  dexamethasone (DECADRON) injection 4 mg, 4 mg, IntraVENous, Once  dexamethasone (DECADRON) injection 4 mg, 4 mg, IntraDERmal, Once  neomycin-bacitracin-polymyxin (NEOSPORIN) ointment, , Topical, Once  sodium bicarbonate 4.2 % injection 1.5 mEq, 1.5 mEq, IntraDERmal, Once  LORazepam (ATIVAN) tablet 1 mg, 1 mg, Oral, Q4H PRN    Imaging Studies: MRI of the Brain:  Date: 7/19/2022  Result:   Impression Enhancing, partially cystic and solid mass within the right frontal lobe, worrisome for a brain metastasis given the history. ASSESSMENT   Patient is a 70 yo female with history of metastatic breast CA who is here for [facility] for right frontal brain metastasis. Plan:  Patient seen and focused exam done today- no new changes since last physical exam that was completed on 7/28/2022 by PCP  Gamma knife today  Okay to proceed w/ procedure as planned     ASHLEY Ojeda-CNP  Neurosurgery Nurse Practitioner  [phone] cell      This patient was discussed with Dr. Orlin Humphreys who agrees with the above plan. (3) slightly limited

## 2024-10-08 NOTE — PRE-ANESTHESIA EVALUATION ADULT - NSANTHPMHFT_GEN_ALL_CORE
hx of pacemaker battery change last week, no issues w anesthesia  hx of AVR, aortic aneurysm stable in size  hx of afib eliquis held

## 2024-10-08 NOTE — ASU PATIENT PROFILE, ADULT - FALL HARM RISK - HARM RISK INTERVENTIONS

## 2024-10-09 DIAGNOSIS — R06.02 SHORTNESS OF BREATH: ICD-10-CM

## 2024-10-10 ENCOUNTER — LABORATORY RESULT (OUTPATIENT)
Age: 74
End: 2024-10-10

## 2024-10-10 ENCOUNTER — APPOINTMENT (OUTPATIENT)
Dept: PULMONOLOGY | Facility: CLINIC | Age: 74
End: 2024-10-10
Payer: MEDICARE

## 2024-10-10 VITALS
OXYGEN SATURATION: 97 % | WEIGHT: 214 LBS | HEART RATE: 75 BPM | BODY MASS INDEX: 28.67 KG/M2 | DIASTOLIC BLOOD PRESSURE: 76 MMHG | HEIGHT: 72.5 IN | SYSTOLIC BLOOD PRESSURE: 136 MMHG

## 2024-10-10 DIAGNOSIS — Z95.0 PRESENCE OF CARDIAC PACEMAKER: ICD-10-CM

## 2024-10-10 DIAGNOSIS — Z95.2 PRESENCE OF PROSTHETIC HEART VALVE: ICD-10-CM

## 2024-10-10 DIAGNOSIS — I48.91 UNSPECIFIED ATRIAL FIBRILLATION: ICD-10-CM

## 2024-10-10 DIAGNOSIS — J84.9 INTERSTITIAL PULMONARY DISEASE, UNSPECIFIED: ICD-10-CM

## 2024-10-10 DIAGNOSIS — J81.1 CHRONIC PULMONARY EDEMA: ICD-10-CM

## 2024-10-10 DIAGNOSIS — I10 ESSENTIAL (PRIMARY) HYPERTENSION: ICD-10-CM

## 2024-10-10 DIAGNOSIS — E11.36 TYPE 2 DIABETES MELLITUS WITH DIABETIC CATARACT: ICD-10-CM

## 2024-10-10 DIAGNOSIS — Z79.84 LONG TERM (CURRENT) USE OF ORAL HYPOGLYCEMIC DRUGS: ICD-10-CM

## 2024-10-10 DIAGNOSIS — Z79.01 LONG TERM (CURRENT) USE OF ANTICOAGULANTS: ICD-10-CM

## 2024-10-10 DIAGNOSIS — H25.12 AGE-RELATED NUCLEAR CATARACT, LEFT EYE: ICD-10-CM

## 2024-10-10 DIAGNOSIS — Z79.82 LONG TERM (CURRENT) USE OF ASPIRIN: ICD-10-CM

## 2024-10-10 DIAGNOSIS — E78.5 HYPERLIPIDEMIA, UNSPECIFIED: ICD-10-CM

## 2024-10-10 DIAGNOSIS — Z80.9 FAMILY HISTORY OF MALIGNANT NEOPLASM, UNSPECIFIED: ICD-10-CM

## 2024-10-10 PROCEDURE — 99204 OFFICE O/P NEW MOD 45 MIN: CPT

## 2024-10-10 PROCEDURE — G2211 COMPLEX E/M VISIT ADD ON: CPT

## 2024-10-17 ENCOUNTER — APPOINTMENT (OUTPATIENT)
Dept: HEART FAILURE | Facility: CLINIC | Age: 74
End: 2024-10-17

## 2024-10-17 VITALS
DIASTOLIC BLOOD PRESSURE: 62 MMHG | BODY MASS INDEX: 28.31 KG/M2 | OXYGEN SATURATION: 94 % | WEIGHT: 209 LBS | HEIGHT: 72 IN | HEART RATE: 71 BPM | SYSTOLIC BLOOD PRESSURE: 104 MMHG

## 2024-10-17 DIAGNOSIS — I27.20 PULMONARY HYPERTENSION, UNSPECIFIED: ICD-10-CM

## 2024-10-17 DIAGNOSIS — R06.02 SHORTNESS OF BREATH: ICD-10-CM

## 2024-10-17 DIAGNOSIS — I10 ESSENTIAL (PRIMARY) HYPERTENSION: ICD-10-CM

## 2024-10-17 PROCEDURE — 94618 PULMONARY STRESS TESTING: CPT

## 2024-10-17 PROCEDURE — 99205 OFFICE O/P NEW HI 60 MIN: CPT | Mod: 25

## 2024-10-17 PROCEDURE — 93308 TTE F-UP OR LMTD: CPT

## 2024-10-17 PROCEDURE — 93000 ELECTROCARDIOGRAM COMPLETE: CPT | Mod: 59

## 2024-10-18 ENCOUNTER — NON-APPOINTMENT (OUTPATIENT)
Age: 74
End: 2024-10-18

## 2024-10-18 ENCOUNTER — APPOINTMENT (OUTPATIENT)
Dept: ELECTROPHYSIOLOGY | Facility: CLINIC | Age: 74
End: 2024-10-18
Payer: MEDICARE

## 2024-10-18 VITALS
HEART RATE: 74 BPM | DIASTOLIC BLOOD PRESSURE: 57 MMHG | WEIGHT: 208 LBS | BODY MASS INDEX: 27.57 KG/M2 | HEIGHT: 73 IN | TEMPERATURE: 97.1 F | SYSTOLIC BLOOD PRESSURE: 91 MMHG

## 2024-10-18 DIAGNOSIS — I48.0 PAROXYSMAL ATRIAL FIBRILLATION: ICD-10-CM

## 2024-10-18 DIAGNOSIS — Z95.0 PRESENCE OF CARDIAC PACEMAKER: ICD-10-CM

## 2024-10-18 DIAGNOSIS — I49.5 SICK SINUS SYNDROME: ICD-10-CM

## 2024-10-18 DIAGNOSIS — I44.2 ATRIOVENTRICULAR BLOCK, COMPLETE: ICD-10-CM

## 2024-10-18 PROCEDURE — 99215 OFFICE O/P EST HI 40 MIN: CPT

## 2024-10-18 PROCEDURE — 99024 POSTOP FOLLOW-UP VISIT: CPT

## 2024-10-18 PROCEDURE — 93290 INTERROG DEV EVAL ICPMS IP: CPT

## 2024-10-18 RX ORDER — METFORMIN ER 500 MG 500 MG/1
500 TABLET ORAL DAILY
Refills: 0 | Status: ACTIVE | COMMUNITY

## 2024-10-18 RX ORDER — LATANOPROST/PF 0.005 %
0.01 DROPS OPHTHALMIC (EYE)
Refills: 0 | Status: ACTIVE | COMMUNITY

## 2024-10-18 RX ORDER — DORZOLAMIDE HYDROCHLORIDE TIMOLOL MALEATE 20; 5 MG/ML; MG/ML
2-0.5 SOLUTION/ DROPS OPHTHALMIC
Refills: 0 | Status: ACTIVE | COMMUNITY

## 2024-10-18 RX ORDER — TORSEMIDE 10 MG/1
10 TABLET ORAL AS DIRECTED
Qty: 30 | Refills: 3 | Status: ACTIVE | COMMUNITY

## 2024-10-18 RX ORDER — SACUBITRIL AND VALSARTAN 24; 26 MG/1; MG/1
24-26 TABLET, FILM COATED ORAL
Qty: 60 | Refills: 3 | Status: ACTIVE | COMMUNITY
Start: 2024-10-17

## 2024-10-29 ENCOUNTER — OUTPATIENT (OUTPATIENT)
Dept: OUTPATIENT SERVICES | Facility: HOSPITAL | Age: 74
LOS: 1 days | Discharge: HOME | End: 2024-10-29
Payer: MEDICARE

## 2024-10-29 ENCOUNTER — APPOINTMENT (OUTPATIENT)
Dept: PULMONOLOGY | Facility: CLINIC | Age: 74
End: 2024-10-29

## 2024-10-29 VITALS
HEIGHT: 73 IN | RESPIRATION RATE: 17 BRPM | SYSTOLIC BLOOD PRESSURE: 116 MMHG | WEIGHT: 207.9 LBS | OXYGEN SATURATION: 98 % | HEART RATE: 75 BPM | TEMPERATURE: 98 F | DIASTOLIC BLOOD PRESSURE: 63 MMHG

## 2024-10-29 VITALS — RESPIRATION RATE: 17 BRPM | HEART RATE: 80 BPM | DIASTOLIC BLOOD PRESSURE: 55 MMHG | SYSTOLIC BLOOD PRESSURE: 103 MMHG

## 2024-10-29 DIAGNOSIS — H25.11 AGE-RELATED NUCLEAR CATARACT, RIGHT EYE: ICD-10-CM

## 2024-10-29 DIAGNOSIS — Z95.2 PRESENCE OF PROSTHETIC HEART VALVE: Chronic | ICD-10-CM

## 2024-10-29 DIAGNOSIS — Z98.890 OTHER SPECIFIED POSTPROCEDURAL STATES: Chronic | ICD-10-CM

## 2024-10-29 LAB — GLUCOSE BLDC GLUCOMTR-MCNC: 159 MG/DL — HIGH (ref 70–99)

## 2024-10-29 PROCEDURE — 82962 GLUCOSE BLOOD TEST: CPT

## 2024-10-29 PROCEDURE — V2632: CPT

## 2024-10-29 NOTE — ASU PATIENT PROFILE, ADULT - FALL HARM RISK - UNIVERSAL INTERVENTIONS
Bed in lowest position, wheels locked, appropriate side rails in place/Call bell, personal items and telephone in reach/Instruct patient to call for assistance before getting out of bed or chair/Non-slip footwear when patient is out of bed/[place] to call system/Physically safe environment - no spills, clutter or unnecessary equipment/Purposeful Proactive Rounding/Room/bathroom lighting operational, light cord in reach

## 2024-10-29 NOTE — CHART NOTE - NSCHARTNOTEFT_GEN_A_CORE
PACU ANESTHESIA ADMISSION NOTE      Procedure:   Post op diagnosis:      ____  Intubated  TV:______       Rate: ______      FiO2: ______    __x__  Patent Airway    __x__  Full return of protective reflexes    __x__  Full recovery from anesthesia / back to baseline status    Vitals:  T(C): 36.7 (10-29-24 @ 08:11), Max: 36.7 (10-29-24 @ 07:50)  HR: 75 (10-29-24 @ 08:11) (75 - 75)  BP: 116/63 (10-29-24 @ 08:11) (116/63 - 116/63)  RR: 17 (10-29-24 @ 08:11) (17 - 17)  SpO2: 98% (10-29-24 @ 08:11) (98% - 98%)    Mental Status:  __x__ Awake   ___x__ Alert   _____ Drowsy   _____ Sedated    Nausea/Vomiting:  __x__ NO  ______Yes,   See Post - Op Orders          Pain Scale (0-10):  _____    Treatment: ____ None    __x__ See Post - Op/PCA Orders    Post - Operative Fluids:   ____ Oral   __x__ See Post - Op Orders    Plan: Discharge:   ____Home       _____Floor     _____Critical Care    _____  Other:_________________    Comments: Patient had smooth intraoperative event, no anesthesia complication.  PACU Vital signs: HR:69            BP:        108/55          RR: 16            O2 Sat:       100%     Temp

## 2024-11-04 DIAGNOSIS — H25.11 AGE-RELATED NUCLEAR CATARACT, RIGHT EYE: ICD-10-CM

## 2024-11-04 DIAGNOSIS — Z79.82 LONG TERM (CURRENT) USE OF ASPIRIN: ICD-10-CM

## 2024-11-04 DIAGNOSIS — Z79.84 LONG TERM (CURRENT) USE OF ORAL HYPOGLYCEMIC DRUGS: ICD-10-CM

## 2024-11-04 DIAGNOSIS — I10 ESSENTIAL (PRIMARY) HYPERTENSION: ICD-10-CM

## 2024-11-04 DIAGNOSIS — M19.90 UNSPECIFIED OSTEOARTHRITIS, UNSPECIFIED SITE: ICD-10-CM

## 2024-11-04 DIAGNOSIS — E78.5 HYPERLIPIDEMIA, UNSPECIFIED: ICD-10-CM

## 2024-11-04 DIAGNOSIS — Z79.01 LONG TERM (CURRENT) USE OF ANTICOAGULANTS: ICD-10-CM

## 2024-11-04 DIAGNOSIS — I48.20 CHRONIC ATRIAL FIBRILLATION, UNSPECIFIED: ICD-10-CM

## 2024-11-04 DIAGNOSIS — E11.36 TYPE 2 DIABETES MELLITUS WITH DIABETIC CATARACT: ICD-10-CM

## 2024-11-07 ENCOUNTER — APPOINTMENT (OUTPATIENT)
Dept: PULMONOLOGY | Facility: CLINIC | Age: 74
End: 2024-11-07

## 2024-11-11 ENCOUNTER — OUTPATIENT (OUTPATIENT)
Dept: OUTPATIENT SERVICES | Facility: HOSPITAL | Age: 74
LOS: 1 days | End: 2024-11-11
Payer: MEDICARE

## 2024-11-11 ENCOUNTER — RESULT REVIEW (OUTPATIENT)
Age: 74
End: 2024-11-11

## 2024-11-11 DIAGNOSIS — R06.02 SHORTNESS OF BREATH: ICD-10-CM

## 2024-11-11 DIAGNOSIS — Z00.8 ENCOUNTER FOR OTHER GENERAL EXAMINATION: ICD-10-CM

## 2024-11-11 DIAGNOSIS — Z98.890 OTHER SPECIFIED POSTPROCEDURAL STATES: Chronic | ICD-10-CM

## 2024-11-11 DIAGNOSIS — Z95.2 PRESENCE OF PROSTHETIC HEART VALVE: Chronic | ICD-10-CM

## 2024-11-11 PROCEDURE — 71250 CT THORAX DX C-: CPT

## 2024-11-11 PROCEDURE — 71250 CT THORAX DX C-: CPT | Mod: 26

## 2024-11-12 DIAGNOSIS — R06.02 SHORTNESS OF BREATH: ICD-10-CM

## 2024-11-13 ENCOUNTER — APPOINTMENT (OUTPATIENT)
Dept: PULMONOLOGY | Facility: CLINIC | Age: 74
End: 2024-11-13
Payer: MEDICARE

## 2024-11-13 VITALS
HEIGHT: 73 IN | BODY MASS INDEX: 27.3 KG/M2 | WEIGHT: 206 LBS | SYSTOLIC BLOOD PRESSURE: 120 MMHG | DIASTOLIC BLOOD PRESSURE: 63 MMHG | OXYGEN SATURATION: 91 % | HEART RATE: 93 BPM

## 2024-11-13 PROCEDURE — 99213 OFFICE O/P EST LOW 20 MIN: CPT

## 2024-11-13 PROCEDURE — G2211 COMPLEX E/M VISIT ADD ON: CPT

## 2024-11-15 ENCOUNTER — NON-APPOINTMENT (OUTPATIENT)
Age: 74
End: 2024-11-15

## 2024-11-15 ENCOUNTER — APPOINTMENT (OUTPATIENT)
Dept: HEART FAILURE | Facility: CLINIC | Age: 74
End: 2024-11-15
Payer: MEDICARE

## 2024-11-15 VITALS
DIASTOLIC BLOOD PRESSURE: 56 MMHG | WEIGHT: 207 LBS | BODY MASS INDEX: 27.43 KG/M2 | HEART RATE: 72 BPM | OXYGEN SATURATION: 95 % | SYSTOLIC BLOOD PRESSURE: 110 MMHG | HEIGHT: 73 IN

## 2024-11-15 DIAGNOSIS — J84.9 INTERSTITIAL PULMONARY DISEASE, UNSPECIFIED: ICD-10-CM

## 2024-11-15 DIAGNOSIS — I48.91 UNSPECIFIED ATRIAL FIBRILLATION: ICD-10-CM

## 2024-11-15 DIAGNOSIS — R06.02 SHORTNESS OF BREATH: ICD-10-CM

## 2024-11-15 DIAGNOSIS — I48.0 PAROXYSMAL ATRIAL FIBRILLATION: ICD-10-CM

## 2024-11-15 DIAGNOSIS — I27.20 PULMONARY HYPERTENSION, UNSPECIFIED: ICD-10-CM

## 2024-11-15 PROCEDURE — 99215 OFFICE O/P EST HI 40 MIN: CPT

## 2024-11-15 PROCEDURE — 93308 TTE F-UP OR LMTD: CPT

## 2024-11-15 PROCEDURE — 93000 ELECTROCARDIOGRAM COMPLETE: CPT

## 2024-11-15 PROCEDURE — G2211 COMPLEX E/M VISIT ADD ON: CPT

## 2024-12-12 ENCOUNTER — APPOINTMENT (OUTPATIENT)
Dept: HEART FAILURE | Facility: CLINIC | Age: 74
End: 2024-12-12
Payer: MEDICARE

## 2024-12-12 VITALS
BODY MASS INDEX: 26.9 KG/M2 | WEIGHT: 203 LBS | SYSTOLIC BLOOD PRESSURE: 108 MMHG | HEART RATE: 77 BPM | HEIGHT: 73 IN | OXYGEN SATURATION: 97 % | DIASTOLIC BLOOD PRESSURE: 54 MMHG

## 2024-12-12 DIAGNOSIS — R06.02 SHORTNESS OF BREATH: ICD-10-CM

## 2024-12-12 DIAGNOSIS — I48.91 UNSPECIFIED ATRIAL FIBRILLATION: ICD-10-CM

## 2024-12-12 LAB
ALBUMIN SERPL ELPH-MCNC: 4.7 G/DL
ALP BLD-CCNC: 54 U/L
ALT SERPL-CCNC: 19 U/L
ANION GAP SERPL CALC-SCNC: 15 MMOL/L
APTT BLD: 38 SEC
AST SERPL-CCNC: 33 U/L
BASOPHILS # BLD AUTO: 0.05 K/UL
BASOPHILS NFR BLD AUTO: 0.6 %
BILIRUB SERPL-MCNC: 0.8 MG/DL
BUN SERPL-MCNC: 23 MG/DL
CALCIUM SERPL-MCNC: 9.6 MG/DL
CHLORIDE SERPL-SCNC: 103 MMOL/L
CO2 SERPL-SCNC: 22 MMOL/L
CREAT SERPL-MCNC: 1.4 MG/DL
EGFR: 52 ML/MIN/1.73M2
EOSINOPHIL # BLD AUTO: 0.24 K/UL
EOSINOPHIL NFR BLD AUTO: 2.8 %
GLUCOSE SERPL-MCNC: 137 MG/DL
HCT VFR BLD CALC: 38 %
HGB BLD-MCNC: 12.1 G/DL
IMM GRANULOCYTES NFR BLD AUTO: 0.5 %
INR PPP: 1.23 RATIO
LYMPHOCYTES # BLD AUTO: 1.03 K/UL
LYMPHOCYTES NFR BLD AUTO: 11.8 %
MAN DIFF?: NORMAL
MCHC RBC-ENTMCNC: 30 PG
MCHC RBC-ENTMCNC: 31.8 G/DL
MCV RBC AUTO: 94.1 FL
MONOCYTES # BLD AUTO: 0.88 K/UL
MONOCYTES NFR BLD AUTO: 10.1 %
NEUTROPHILS # BLD AUTO: 6.47 K/UL
NEUTROPHILS NFR BLD AUTO: 74.2 %
PLATELET # BLD AUTO: 203 K/UL
PMV BLD AUTO: 0 /100 WBCS
POTASSIUM SERPL-SCNC: 4.5 MMOL/L
PROT SERPL-MCNC: 7.4 G/DL
PT BLD: 14.6 SEC
RBC # BLD: 4.04 M/UL
RBC # FLD: 14.4 %
SODIUM SERPL-SCNC: 140 MMOL/L
WBC # FLD AUTO: 8.71 K/UL

## 2024-12-12 PROCEDURE — 99215 OFFICE O/P EST HI 40 MIN: CPT

## 2024-12-12 PROCEDURE — 93000 ELECTROCARDIOGRAM COMPLETE: CPT

## 2024-12-12 PROCEDURE — G2211 COMPLEX E/M VISIT ADD ON: CPT

## 2024-12-13 VITALS
WEIGHT: 315 LBS | OXYGEN SATURATION: 95 % | DIASTOLIC BLOOD PRESSURE: 61 MMHG | HEART RATE: 80 BPM | SYSTOLIC BLOOD PRESSURE: 109 MMHG | RESPIRATION RATE: 16 BRPM | HEIGHT: 73 IN

## 2024-12-13 NOTE — H&P CARDIOLOGY - NSICDXPASTMEDICALHX_GEN_ALL_CORE_FT
PAST MEDICAL HISTORY:  Atrial fibrillation     DM (diabetes mellitus)     Dyslipidemia     H/O endocarditis     H/O: osteoarthritis     History of BPH     Hypertension     Infective endocarditis      PAST MEDICAL HISTORY:  Atrial fibrillation     DM (diabetes mellitus)     Dyslipidemia     H/O endocarditis     H/O: osteoarthritis     History of BPH     Hypertension

## 2024-12-13 NOTE — H&P CARDIOLOGY - HISTORY OF PRESENT ILLNESS
Patient is a 74y Male PMH of of AVRx3, Afib, s/p CRT for CHB in 2018, stenosis of bioprosthetic aortic valve presents for evaluation of SOB on exertion, orthopnea corresponding with elevated RVSP on recent TTE. pro-BNP on 10/10/2024 was 932, started on Torsemide 20 mg by his cardiologist Dr Foster 5 days ago. Patient reports desaturation to 83-84% SaO2 after going up 1 flight of stairs. His baseline SaO2 is 94% on room air. He can walk about 100 yards on a flat surface before he develops dyspnea. He has been having dyspnea on exertion for the last 2-3 years and it is progressing. Patient presents today for RHC.         Pre cath note:  indication:  [ ] STEMI                [ ] NSTEMI                 [ ] Acute coronary syndrome                   [ ]Unstable Angina   [ ] high risk  [ ] intermediate risk  [ ] low risk                   [ ] Stable Angina     non-invasive testing:                          Date:                     result: [ ] high risk  [ ] intermediate risk  [ ] low risk    Anti- Anginal medications:                    [ ] not used d/t                     [ ] used   ( ) BB     ( ) CCB      ( ) Nitrate   (  ) Ranexa          [ ] not used but strong indication not to use    Ejection Fraction                   [ ] <29            [ ] 30-39%   [ ] 40-49%     [ ]>50%    CHF          [ ] active (within last 14 days on meds   [x ] Chronic (on meds but no exacerbation)  NYHA Functional Class:  (  ) Class I (no limitations)  (  ) Class II (slight limitation)  (x  ) Class III (marked limitation)  (  ) Class IV (symptoms at rest)    COPD                   [ ] mild (on chronic bronchodilators)  [ ] moderate (on chronic steroid therapy)      [ ] severe (indication for home O2 or PACO2 >50)    Other risk factors:                     [ ] Previous MI                     [ ] CVA/ stroke                    [ ] carotid stent/ CEA                    [ ] PVD/PAD- (arterial aneurysm, non-palpable pulses, tortuous vessel with inability to insert catheter, infra-renal dissection, renal or subclavian artery stenosis)                    [ ] previous CABG                    [ ] Renal Failure:  on HD  (  ) yes  (  ) no                    [x ] Diabetic  (  ) Type 1  (x  ) Type 2                                         (  ) Insulin dependent  (  ) non-insulin dependent                                         (x  ) Metformin  (  ) Januvia  (  ) Glimepiride  (  ) Glipizide  (  ) Glyburide  (  ) Actos                                         (  ) GLP-1 receptor agonists (Ozempic, Mounjaro, Wegovy, trulicity, Byetta, Victoza)                                         (  ) SGLT2 Inhibitors (Farxiga, Jardiance, Invokana)                                         (  ) Other                Bleeding Risk: 1.9%    Pre-cath Hydration: (  )  cc IV bolus x 1 over 1 hr followed by:  (  ) NS @ 75cc/hr until procedure (up to 2 hrs) if EF> 50%                                                                                                                         (  ) NS @ 50cc/hr until procedure (up to 2 hrs) if EF< 50%                                      (  ) No precath hydration d/t    RIGHT RADIAL ARTERY EVALUATION:  JENNIFER TEST: [] Negative          [] Positive    EF:   Date:    EKG:   Date:   74y Male with PMH of of AVR x3, Afib, s/p CRT for CHB in 2018, stenosis of bioprosthetic aortic valve, who presented to his cardiologist for evaluation of SOB on exertion, orthopnea corresponding with elevated RVSP on recent TTE. Pro-BNP on 10/10/2024 was 932, started on Torsemide 20 mg by his cardiologist Dr Foster 5 days ago. Patient reports desaturation to 83-84% SaO2 after going up 1 flight of stairs. His baseline SaO2 is 94% on room air. He can walk about 100 yards on a flat surface before he develops dyspnea. He has been having dyspnea on exertion for the last 2-3 years and it is progressing. Patient presents today for RHC.       Adjusted bleeding risk: 1.9%  No IVF, for RHC only   74y Male with PMH of of AVR x 3 (1996, 2011, and 2012), Afib, s/p CRT for CHB in 2018, stenosis of bioprosthetic aortic valve, who presented to his cardiologist for evaluation of SOB on exertion, orthopnea corresponding with elevated RVSP on recent TTE. Pro-BNP on 10/10/2024 was 932, started on Torsemide 20 mg by his cardiologist Dr Foster. Patient reports desaturation to 83-84% SaO2 after going up 1 flight of stairs. His baseline SaO2 is 94% on room air. He can walk about 100 yards on a flat surface before he develops dyspnea. He has been having dyspnea on exertion for the last 2-3 years and it is progressing. Patient presents today for RHC.       Adjusted bleeding risk: 1.9%  No IVF, for RHC only

## 2024-12-13 NOTE — H&P CARDIOLOGY - NSICDXPASTSURGICALHX_GEN_ALL_CORE_FT
PAST SURGICAL HISTORY:  H/O aortic aneurysm repair     S/P AVR (aortic valve replacement) complicated by aortic aneurysm and infective endocarditis     PAST SURGICAL HISTORY:  H/O aortic aneurysm repair     H/O knee surgery     S/P AVR (aortic valve replacement) complicated by aortic aneurysm and infective endocarditis    S/P caroline

## 2024-12-13 NOTE — H&P CARDIOLOGY - COMMENTS
74y Male with PMH of of AVR x3, Afib, s/p CRT for CHB in 2018, stenosis of bioprosthetic aortic valve, who presented to his cardiologist for evaluation of SOB on exertion, orthopnea corresponding with elevated RVSP on recent TTE. Pt presents today for RHC.

## 2024-12-16 ENCOUNTER — TRANSCRIPTION ENCOUNTER (OUTPATIENT)
Age: 74
End: 2024-12-16

## 2024-12-16 ENCOUNTER — OUTPATIENT (OUTPATIENT)
Dept: OUTPATIENT SERVICES | Facility: HOSPITAL | Age: 74
LOS: 1 days | Discharge: HOME | End: 2024-12-16
Payer: MEDICARE

## 2024-12-16 VITALS
HEART RATE: 74 BPM | OXYGEN SATURATION: 94 % | SYSTOLIC BLOOD PRESSURE: 100 MMHG | DIASTOLIC BLOOD PRESSURE: 46 MMHG | RESPIRATION RATE: 16 BRPM

## 2024-12-16 DIAGNOSIS — I27.20 PULMONARY HYPERTENSION, UNSPECIFIED: ICD-10-CM

## 2024-12-16 DIAGNOSIS — Z95.2 PRESENCE OF PROSTHETIC HEART VALVE: Chronic | ICD-10-CM

## 2024-12-16 DIAGNOSIS — Z98.890 OTHER SPECIFIED POSTPROCEDURAL STATES: Chronic | ICD-10-CM

## 2024-12-16 PROBLEM — I33.0 ACUTE AND SUBACUTE INFECTIVE ENDOCARDITIS: Chronic | Status: INACTIVE | Noted: 2019-06-08 | Resolved: 2024-12-16

## 2024-12-16 LAB
ANION GAP SERPL CALC-SCNC: 11 MMOL/L — SIGNIFICANT CHANGE UP (ref 7–14)
BUN SERPL-MCNC: 27 MG/DL — HIGH (ref 10–20)
CALCIUM SERPL-MCNC: 9.2 MG/DL — SIGNIFICANT CHANGE UP (ref 8.4–10.5)
CHLORIDE SERPL-SCNC: 107 MMOL/L — SIGNIFICANT CHANGE UP (ref 98–110)
CO2 SERPL-SCNC: 23 MMOL/L — SIGNIFICANT CHANGE UP (ref 17–32)
CREAT SERPL-MCNC: 1.3 MG/DL — SIGNIFICANT CHANGE UP (ref 0.7–1.5)
EGFR: 57 ML/MIN/1.73M2 — LOW
GLUCOSE BLDC GLUCOMTR-MCNC: 128 MG/DL — HIGH (ref 70–99)
GLUCOSE SERPL-MCNC: 139 MG/DL — HIGH (ref 70–99)
HCT VFR BLD CALC: 37 % — LOW (ref 42–52)
HGB BLD-MCNC: 12.2 G/DL — LOW (ref 14–18)
MCHC RBC-ENTMCNC: 30.5 PG — SIGNIFICANT CHANGE UP (ref 27–31)
MCHC RBC-ENTMCNC: 33 G/DL — SIGNIFICANT CHANGE UP (ref 32–37)
MCV RBC AUTO: 92.5 FL — SIGNIFICANT CHANGE UP (ref 80–94)
NRBC # BLD: 0 /100 WBCS — SIGNIFICANT CHANGE UP (ref 0–0)
PLATELET # BLD AUTO: 187 K/UL — SIGNIFICANT CHANGE UP (ref 130–400)
PMV BLD: 12.3 FL — HIGH (ref 7.4–10.4)
POTASSIUM SERPL-MCNC: 4.8 MMOL/L — SIGNIFICANT CHANGE UP (ref 3.5–5)
POTASSIUM SERPL-SCNC: 4.8 MMOL/L — SIGNIFICANT CHANGE UP (ref 3.5–5)
RBC # BLD: 4 M/UL — LOW (ref 4.7–6.1)
RBC # FLD: 14.4 % — SIGNIFICANT CHANGE UP (ref 11.5–14.5)
SODIUM SERPL-SCNC: 141 MMOL/L — SIGNIFICANT CHANGE UP (ref 135–146)
WBC # BLD: 7.01 K/UL — SIGNIFICANT CHANGE UP (ref 4.8–10.8)
WBC # FLD AUTO: 7.01 K/UL — SIGNIFICANT CHANGE UP (ref 4.8–10.8)

## 2024-12-16 PROCEDURE — C1769: CPT

## 2024-12-16 PROCEDURE — C1894: CPT

## 2024-12-16 PROCEDURE — 36415 COLL VENOUS BLD VENIPUNCTURE: CPT

## 2024-12-16 PROCEDURE — 93451 RIGHT HEART CATH: CPT

## 2024-12-16 PROCEDURE — 82962 GLUCOSE BLOOD TEST: CPT

## 2024-12-16 PROCEDURE — 80048 BASIC METABOLIC PNL TOTAL CA: CPT

## 2024-12-16 PROCEDURE — 85027 COMPLETE CBC AUTOMATED: CPT

## 2024-12-16 PROCEDURE — C1889: CPT

## 2024-12-16 RX ORDER — CHLORHEXIDINE GLUCONATE 1.2 MG/ML
1 RINSE ORAL ONCE
Refills: 0 | Status: DISCONTINUED | OUTPATIENT
Start: 2024-12-16 | End: 2024-12-16

## 2024-12-16 RX ORDER — TORSEMIDE 10 MG
1 TABLET ORAL
Refills: 0 | DISCHARGE

## 2024-12-16 RX ORDER — SACUBITRIL AND VALSARTAN 24; 26 MG/1; MG/1
0.5 TABLET, FILM COATED ORAL
Refills: 0 | DISCHARGE

## 2024-12-16 NOTE — ASU DISCHARGE PLAN (ADULT/PEDIATRIC) - BATHING
[Good] : ~his/her~  mood as  good [Yes] : Yes [Monthly or less (1 pt)] : Monthly or less (1 point) [0] : 2) Feeling down, depressed, or hopeless: Not at all (0) [PHQ-2 Negative - No further assessment needed] : PHQ-2 Negative - No further assessment needed [FreeTextEntry1] : Abdominal bloating [] : No [Audit-CScore] : 1 [[id number]] : 0 Shower only

## 2024-12-16 NOTE — PRE-OP CHECKLIST - TAMPON REMOVED
Patient transported to [employer], RN  12/28/17 3013
Pt returned from [employer], RN  12/28/17 0550
n/a

## 2024-12-16 NOTE — ASU DISCHARGE PLAN (ADULT/PEDIATRIC) - NS MD DC FALL RISK RISK
For information on Fall & Injury Prevention, visit: [email or web address] OR  [email or web address] OR  https://www.cdc.gov/steadi/patient.html

## 2024-12-16 NOTE — ASU DISCHARGE PLAN (ADULT/PEDIATRIC) - FINANCIAL ASSISTANCE
[facility] provides services at a reduced cost to those who are determined to be eligible through [facility]’s financial assistance program. Information regarding [facility]’s financial assistance program can be found by going to [email or web address] or by calling [phone].

## 2024-12-16 NOTE — ASU DISCHARGE PLAN (ADULT/PEDIATRIC) - CALL YOUR DOCTOR IF YOU HAVE ANY OF THE FOLLOWING:
Bleeding that does not stop/Swelling that gets worse/Pain not relieved by Medications/Fever greater than (need to indicate Fahrenheit or Celsius)/Wound/Surgical Site with redness, or foul smelling discharge or pus/Numbness, tingling, color or temperature change to extremity Metronidazole Counseling:  I discussed with the patient the risks of metronidazole including but not limited to seizures, nausea/vomiting, a metallic taste in the mouth, nausea/vomiting and severe allergy.

## 2024-12-16 NOTE — ASU PATIENT PROFILE, ADULT - NSSUBSTANCEUSE_GEN_ALL_CORE_SD
----- Message from Jocelyn Avila DO sent at 7/31/2023 11:13 AM CDT -----  Please call and inform patient that his kidney biopsy revealed arterionephrosclerosis. There was no other diseases present to be treated. Therefore, his CKD is due to HTN. No further findings. He can keep his next scheduled follow up. TY     never used

## 2024-12-16 NOTE — ASU DISCHARGE PLAN (ADULT/PEDIATRIC) - CARE PROVIDER_API CALL
Ruby Hurst  [facility]  [address] [place] [id number]  Phone: [phone]  Fax: [phone]  Follow Up Time: 2 weeks

## 2024-12-16 NOTE — ASU DISCHARGE PLAN (ADULT/PEDIATRIC) - ASU DC SPECIAL INSTRUCTIONSFT
Discharge Instructions as follows:  - Continue medical regimen as prescribed to prevent chest pain.  - If you are diabetic and taking medication containing Metformin, do not take them for 48 hours after the procedure  - Continue baby ASA, beta blocker, Statin   -Resume Eliquis tomorrow in AM 12/17  - Instructed to call 911 if chest pain, shortness of breath or bleeding from access site.  - No heavy lifting > 10lbs x 1 week.  - No driving x 24 hours.  - No baths, swimming pools x 1 week, may shower  - Low sodium low fat low cholesterol diet  - Follow-up with Cardiologist in 1-2 weeks after discharge  - Soreness or tenderness at the site is possible, it will diminish over time. You may take Tylenol every 4-6 hours as needed. Nothing stronger is needed. NO Motrin/Ibuprofen  - Any questions call cardiac cath lab [phone]

## 2024-12-17 DIAGNOSIS — J84.9 INTERSTITIAL PULMONARY DISEASE, UNSPECIFIED: ICD-10-CM

## 2024-12-17 DIAGNOSIS — I48.92 UNSPECIFIED ATRIAL FLUTTER: ICD-10-CM

## 2024-12-20 ENCOUNTER — APPOINTMENT (OUTPATIENT)
Dept: CARDIOLOGY | Facility: CLINIC | Age: 74
End: 2024-12-20
Payer: MEDICARE

## 2024-12-20 DIAGNOSIS — I27.20 PULMONARY HYPERTENSION, UNSPECIFIED: ICD-10-CM

## 2024-12-20 DIAGNOSIS — Z95.2 PRESENCE OF PROSTHETIC HEART VALVE: ICD-10-CM

## 2024-12-20 PROCEDURE — 93306 TTE W/DOPPLER COMPLETE: CPT

## 2024-12-24 PROBLEM — Z95.2 S/P AVR (AORTIC VALVE REPLACEMENT): Status: ACTIVE | Noted: 2024-12-24

## 2024-12-30 ENCOUNTER — INPATIENT (INPATIENT)
Facility: HOSPITAL | Age: 74
LOS: 3 days | Discharge: HOME HEALTH | DRG: 377 | End: 2025-01-03
Attending: INTERNAL MEDICINE | Admitting: INTERNAL MEDICINE
Payer: MEDICARE

## 2024-12-30 VITALS
OXYGEN SATURATION: 97 % | HEIGHT: 73 IN | HEART RATE: 96 BPM | SYSTOLIC BLOOD PRESSURE: 94 MMHG | WEIGHT: 205.03 LBS | TEMPERATURE: 98 F | RESPIRATION RATE: 18 BRPM | DIASTOLIC BLOOD PRESSURE: 58 MMHG

## 2024-12-30 DIAGNOSIS — K92.1 MELENA: ICD-10-CM

## 2024-12-30 DIAGNOSIS — Z98.890 OTHER SPECIFIED POSTPROCEDURAL STATES: Chronic | ICD-10-CM

## 2024-12-30 DIAGNOSIS — Z95.2 PRESENCE OF PROSTHETIC HEART VALVE: Chronic | ICD-10-CM

## 2024-12-30 LAB
ALBUMIN SERPL ELPH-MCNC: 4.1 G/DL — SIGNIFICANT CHANGE UP (ref 3.5–5.2)
ALP SERPL-CCNC: 35 U/L — SIGNIFICANT CHANGE UP (ref 30–115)
ALT FLD-CCNC: 10 U/L — SIGNIFICANT CHANGE UP (ref 0–41)
ANION GAP SERPL CALC-SCNC: 9 MMOL/L — SIGNIFICANT CHANGE UP (ref 7–14)
APTT BLD: 32.5 SEC — SIGNIFICANT CHANGE UP (ref 27–39.2)
AST SERPL-CCNC: 23 U/L — SIGNIFICANT CHANGE UP (ref 0–41)
BASOPHILS # BLD AUTO: 0.04 K/UL — SIGNIFICANT CHANGE UP (ref 0–0.2)
BASOPHILS # BLD AUTO: 0.05 K/UL — SIGNIFICANT CHANGE UP (ref 0–0.2)
BASOPHILS NFR BLD AUTO: 0.5 % — SIGNIFICANT CHANGE UP (ref 0–1)
BASOPHILS NFR BLD AUTO: 0.6 % — SIGNIFICANT CHANGE UP (ref 0–1)
BILIRUB SERPL-MCNC: 0.5 MG/DL — SIGNIFICANT CHANGE UP (ref 0.2–1.2)
BUN SERPL-MCNC: 43 MG/DL — HIGH (ref 10–20)
CALCIUM SERPL-MCNC: 9.4 MG/DL — SIGNIFICANT CHANGE UP (ref 8.4–10.5)
CHLORIDE SERPL-SCNC: 104 MMOL/L — SIGNIFICANT CHANGE UP (ref 98–110)
CO2 SERPL-SCNC: 25 MMOL/L — SIGNIFICANT CHANGE UP (ref 17–32)
CREAT SERPL-MCNC: 1.8 MG/DL — HIGH (ref 0.7–1.5)
EGFR: 39 ML/MIN/1.73M2 — LOW
EOSINOPHIL # BLD AUTO: 0.51 K/UL — SIGNIFICANT CHANGE UP (ref 0–0.7)
EOSINOPHIL # BLD AUTO: 0.53 K/UL — SIGNIFICANT CHANGE UP (ref 0–0.7)
EOSINOPHIL NFR BLD AUTO: 6.7 % — SIGNIFICANT CHANGE UP (ref 0–8)
EOSINOPHIL NFR BLD AUTO: 6.9 % — SIGNIFICANT CHANGE UP (ref 0–8)
FLUAV AG NPH QL: SIGNIFICANT CHANGE UP
FLUBV AG NPH QL: SIGNIFICANT CHANGE UP
GLUCOSE BLDC GLUCOMTR-MCNC: 99 MG/DL — SIGNIFICANT CHANGE UP (ref 70–99)
GLUCOSE SERPL-MCNC: 130 MG/DL — HIGH (ref 70–99)
HCT VFR BLD CALC: 25 % — LOW (ref 42–52)
HCT VFR BLD CALC: 26.6 % — LOW (ref 42–52)
HGB BLD-MCNC: 7.9 G/DL — LOW (ref 14–18)
HGB BLD-MCNC: 8.8 G/DL — LOW (ref 14–18)
IMM GRANULOCYTES NFR BLD AUTO: 0.4 % — HIGH (ref 0.1–0.3)
IMM GRANULOCYTES NFR BLD AUTO: 0.5 % — HIGH (ref 0.1–0.3)
INR BLD: 1.16 RATIO — SIGNIFICANT CHANGE UP (ref 0.65–1.3)
LYMPHOCYTES # BLD AUTO: 0.89 K/UL — LOW (ref 1.2–3.4)
LYMPHOCYTES # BLD AUTO: 1.08 K/UL — LOW (ref 1.2–3.4)
LYMPHOCYTES # BLD AUTO: 12 % — LOW (ref 20.5–51.1)
LYMPHOCYTES # BLD AUTO: 13.7 % — LOW (ref 20.5–51.1)
MAGNESIUM SERPL-MCNC: 2.3 MG/DL — SIGNIFICANT CHANGE UP (ref 1.8–2.4)
MCHC RBC-ENTMCNC: 29.8 PG — SIGNIFICANT CHANGE UP (ref 27–31)
MCHC RBC-ENTMCNC: 30.7 PG — SIGNIFICANT CHANGE UP (ref 27–31)
MCHC RBC-ENTMCNC: 31.6 G/DL — LOW (ref 32–37)
MCHC RBC-ENTMCNC: 33.1 G/DL — SIGNIFICANT CHANGE UP (ref 32–37)
MCV RBC AUTO: 92.7 FL — SIGNIFICANT CHANGE UP (ref 80–94)
MCV RBC AUTO: 94.3 FL — HIGH (ref 80–94)
MONOCYTES # BLD AUTO: 0.78 K/UL — HIGH (ref 0.1–0.6)
MONOCYTES # BLD AUTO: 0.8 K/UL — HIGH (ref 0.1–0.6)
MONOCYTES NFR BLD AUTO: 10.1 % — HIGH (ref 1.7–9.3)
MONOCYTES NFR BLD AUTO: 10.5 % — HIGH (ref 1.7–9.3)
NEUTROPHILS # BLD AUTO: 5.18 K/UL — SIGNIFICANT CHANGE UP (ref 1.4–6.5)
NEUTROPHILS # BLD AUTO: 5.4 K/UL — SIGNIFICANT CHANGE UP (ref 1.4–6.5)
NEUTROPHILS NFR BLD AUTO: 68.4 % — SIGNIFICANT CHANGE UP (ref 42.2–75.2)
NEUTROPHILS NFR BLD AUTO: 69.7 % — SIGNIFICANT CHANGE UP (ref 42.2–75.2)
NRBC # BLD: 0 /100 WBCS — SIGNIFICANT CHANGE UP (ref 0–0)
NRBC # BLD: 0 /100 WBCS — SIGNIFICANT CHANGE UP (ref 0–0)
NT-PROBNP SERPL-SCNC: 2196 PG/ML — HIGH (ref 0–300)
PLATELET # BLD AUTO: 187 K/UL — SIGNIFICANT CHANGE UP (ref 130–400)
PLATELET # BLD AUTO: 189 K/UL — SIGNIFICANT CHANGE UP (ref 130–400)
PMV BLD: 12.5 FL — HIGH (ref 7.4–10.4)
PMV BLD: 12.9 FL — HIGH (ref 7.4–10.4)
POTASSIUM SERPL-MCNC: 4.8 MMOL/L — SIGNIFICANT CHANGE UP (ref 3.5–5)
POTASSIUM SERPL-SCNC: 4.8 MMOL/L — SIGNIFICANT CHANGE UP (ref 3.5–5)
PROT SERPL-MCNC: 6.4 G/DL — SIGNIFICANT CHANGE UP (ref 6–8)
PROTHROM AB SERPL-ACNC: 13.7 SEC — HIGH (ref 9.95–12.87)
RBC # BLD: 2.65 M/UL — LOW (ref 4.7–6.1)
RBC # BLD: 2.87 M/UL — LOW (ref 4.7–6.1)
RBC # FLD: 14.9 % — HIGH (ref 11.5–14.5)
RBC # FLD: 15.2 % — HIGH (ref 11.5–14.5)
RSV RNA NPH QL NAA+NON-PROBE: SIGNIFICANT CHANGE UP
SARS-COV-2 RNA SPEC QL NAA+PROBE: SIGNIFICANT CHANGE UP
SODIUM SERPL-SCNC: 138 MMOL/L — SIGNIFICANT CHANGE UP (ref 135–146)
TROPONIN T, HIGH SENSITIVITY RESULT: 52 NG/L — CRITICAL HIGH (ref 6–21)
TROPONIN T, HIGH SENSITIVITY RESULT: 58 NG/L — CRITICAL HIGH (ref 6–21)
WBC # BLD: 7.43 K/UL — SIGNIFICANT CHANGE UP (ref 4.8–10.8)
WBC # BLD: 7.9 K/UL — SIGNIFICANT CHANGE UP (ref 4.8–10.8)
WBC # FLD AUTO: 7.43 K/UL — SIGNIFICANT CHANGE UP (ref 4.8–10.8)
WBC # FLD AUTO: 7.9 K/UL — SIGNIFICANT CHANGE UP (ref 4.8–10.8)

## 2024-12-30 PROCEDURE — 86901 BLOOD TYPING SEROLOGIC RH(D): CPT

## 2024-12-30 PROCEDURE — 36430 TRANSFUSION BLD/BLD COMPNT: CPT

## 2024-12-30 PROCEDURE — 99285 EMERGENCY DEPT VISIT HI MDM: CPT

## 2024-12-30 PROCEDURE — 83550 IRON BINDING TEST: CPT

## 2024-12-30 PROCEDURE — 74176 CT ABD & PELVIS W/O CONTRAST: CPT | Mod: MC

## 2024-12-30 PROCEDURE — 99284 EMERGENCY DEPT VISIT MOD MDM: CPT | Mod: GC

## 2024-12-30 PROCEDURE — 88305 TISSUE EXAM BY PATHOLOGIST: CPT

## 2024-12-30 PROCEDURE — 85610 PROTHROMBIN TIME: CPT

## 2024-12-30 PROCEDURE — 80053 COMPREHEN METABOLIC PANEL: CPT

## 2024-12-30 PROCEDURE — 76604 US EXAM CHEST: CPT | Mod: 26

## 2024-12-30 PROCEDURE — 71045 X-RAY EXAM CHEST 1 VIEW: CPT | Mod: 26

## 2024-12-30 PROCEDURE — 36415 COLL VENOUS BLD VENIPUNCTURE: CPT

## 2024-12-30 PROCEDURE — 83735 ASSAY OF MAGNESIUM: CPT

## 2024-12-30 PROCEDURE — 86850 RBC ANTIBODY SCREEN: CPT

## 2024-12-30 PROCEDURE — 86900 BLOOD TYPING SEROLOGIC ABO: CPT

## 2024-12-30 PROCEDURE — P9016: CPT

## 2024-12-30 PROCEDURE — 93010 ELECTROCARDIOGRAM REPORT: CPT

## 2024-12-30 PROCEDURE — 85730 THROMBOPLASTIN TIME PARTIAL: CPT

## 2024-12-30 PROCEDURE — C1889: CPT

## 2024-12-30 PROCEDURE — 99223 1ST HOSP IP/OBS HIGH 75: CPT

## 2024-12-30 PROCEDURE — 82728 ASSAY OF FERRITIN: CPT

## 2024-12-30 PROCEDURE — 88312 SPECIAL STAINS GROUP 1: CPT

## 2024-12-30 PROCEDURE — 85027 COMPLETE CBC AUTOMATED: CPT

## 2024-12-30 PROCEDURE — 93308 TTE F-UP OR LMTD: CPT | Mod: 26

## 2024-12-30 PROCEDURE — 85025 COMPLETE CBC W/AUTO DIFF WBC: CPT

## 2024-12-30 PROCEDURE — 74018 RADEX ABDOMEN 1 VIEW: CPT

## 2024-12-30 PROCEDURE — 83540 ASSAY OF IRON: CPT

## 2024-12-30 PROCEDURE — 84484 ASSAY OF TROPONIN QUANT: CPT

## 2024-12-30 PROCEDURE — 82962 GLUCOSE BLOOD TEST: CPT

## 2024-12-30 RX ORDER — LATANOPROST 50 UG/ML
1 SOLUTION OPHTHALMIC AT BEDTIME
Refills: 0 | Status: DISCONTINUED | OUTPATIENT
Start: 2024-12-30 | End: 2025-01-03

## 2024-12-30 RX ORDER — TORSEMIDE 10 MG/1
1 TABLET ORAL
Refills: 0 | DISCHARGE

## 2024-12-30 RX ORDER — PANTOPRAZOLE 40 MG/1
40 TABLET, DELAYED RELEASE ORAL EVERY 12 HOURS
Refills: 0 | Status: DISCONTINUED | OUTPATIENT
Start: 2024-12-30 | End: 2025-01-03

## 2024-12-30 RX ORDER — LATANOPROST 50 UG/ML
1 SOLUTION OPHTHALMIC
Refills: 0 | DISCHARGE

## 2024-12-30 RX ORDER — FENOFIBRATE 48 MG/1
145 TABLET ORAL AT BEDTIME
Refills: 0 | Status: DISCONTINUED | OUTPATIENT
Start: 2024-12-30 | End: 2024-12-30

## 2024-12-30 RX ORDER — ONDANSETRON 4 MG/1
4 TABLET ORAL EVERY 8 HOURS
Refills: 0 | Status: DISCONTINUED | OUTPATIENT
Start: 2024-12-30 | End: 2025-01-03

## 2024-12-30 RX ORDER — BUMETANIDE 2 MG/1
1 TABLET ORAL ONCE
Refills: 0 | Status: COMPLETED | OUTPATIENT
Start: 2024-12-30 | End: 2024-12-30

## 2024-12-30 RX ORDER — PANTOPRAZOLE 40 MG/1
8 TABLET, DELAYED RELEASE ORAL
Qty: 80 | Refills: 0 | Status: DISCONTINUED | OUTPATIENT
Start: 2024-12-30 | End: 2024-12-30

## 2024-12-30 RX ORDER — GINKGO BILOBA 40 MG
3 CAPSULE ORAL AT BEDTIME
Refills: 0 | Status: DISCONTINUED | OUTPATIENT
Start: 2024-12-30 | End: 2025-01-03

## 2024-12-30 RX ORDER — PANTOPRAZOLE 40 MG/1
80 TABLET, DELAYED RELEASE ORAL ONCE
Refills: 0 | Status: COMPLETED | OUTPATIENT
Start: 2024-12-30 | End: 2024-12-30

## 2024-12-30 RX ORDER — ROSUVASTATIN 40 MG/1
10 TABLET, FILM COATED ORAL AT BEDTIME
Refills: 0 | Status: DISCONTINUED | OUTPATIENT
Start: 2024-12-30 | End: 2025-01-03

## 2024-12-30 RX ORDER — ACETAMINOPHEN 80 MG/.8ML
650 SOLUTION/ DROPS ORAL EVERY 6 HOURS
Refills: 0 | Status: DISCONTINUED | OUTPATIENT
Start: 2024-12-30 | End: 2025-01-03

## 2024-12-30 RX ORDER — TIMOLOL MALEATE 0.5 %
1 DROPS OPHTHALMIC (EYE) DAILY
Refills: 0 | Status: DISCONTINUED | OUTPATIENT
Start: 2024-12-30 | End: 2024-12-31

## 2024-12-30 RX ORDER — TORSEMIDE 10 MG
1 TABLET ORAL
Refills: 0 | DISCHARGE

## 2024-12-30 RX ORDER — MAG HYDROX/ALUMINUM HYD/SIMETH 200-200-20
30 SUSPENSION, ORAL (FINAL DOSE FORM) ORAL EVERY 4 HOURS
Refills: 0 | Status: DISCONTINUED | OUTPATIENT
Start: 2024-12-30 | End: 2025-01-03

## 2024-12-30 RX ORDER — FENOFIBRATE 48 MG/1
48 TABLET ORAL AT BEDTIME
Refills: 0 | Status: DISCONTINUED | OUTPATIENT
Start: 2024-12-30 | End: 2025-01-03

## 2024-12-30 RX ADMIN — ROSUVASTATIN 10 MILLIGRAM(S): 40 TABLET, FILM COATED ORAL at 22:50

## 2024-12-30 RX ADMIN — PANTOPRAZOLE 10 MG/HR: 40 TABLET, DELAYED RELEASE ORAL at 14:03

## 2024-12-30 RX ADMIN — BUMETANIDE 1 MILLIGRAM(S): 2 TABLET ORAL at 20:00

## 2024-12-30 RX ADMIN — PANTOPRAZOLE 40 MILLIGRAM(S): 40 TABLET, DELAYED RELEASE ORAL at 19:01

## 2024-12-30 RX ADMIN — FENOFIBRATE 48 MILLIGRAM(S): 48 TABLET ORAL at 23:44

## 2024-12-30 RX ADMIN — LATANOPROST 1 DROP(S): 50 SOLUTION OPHTHALMIC at 22:49

## 2024-12-30 RX ADMIN — PANTOPRAZOLE 80 MILLIGRAM(S): 40 TABLET, DELAYED RELEASE ORAL at 14:02

## 2024-12-30 NOTE — CONSULT NOTE ADULT - ASSESSMENT
75 yo M w h/o HFpEF, h/o AVR x 3, CHB s/p CRT, AF on eliquis presents for ROBERT and hypotension. Found to be hypotensive to 94/58, saturating 97% on room air at rest. ED exam positive for melena and labs pertinent for Hb drop of 12-- to 7.     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     TTE-12/14/2024: Reported history of AVR x 3. Peak transaortic velocity is 4.23 m/s, peak/mean transaortic gradient is   72/46 mmHg with an LVOT/aortic valve VTI ratio of 0.25. 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

## 2024-12-30 NOTE — H&P ADULT - ATTENDING COMMENTS
HPI:  74y Male with PMH of HTN, AVR x 3 (1996, 2011, and 2012), chronic Afib, s/p CRT for CHB in 2018, stenosis of bioprosthetic aortic valve came to the ed for shortness of breath. Hx taken from the patient and is aox3 at time of the interview. Hx also provided by the daughter and wife at bedside. States that for the past few days, he has been worsening of his shortness of breath especially on exertion. Recently he has been so short of breath that he is now using pillows under his head during night, and cant lie flat and have to incline his bed up.  Family states that patient is also looking more pale than his baseline. He states for the past 2 days his blood pressure has been too low & 80/38 mmhg) to take his hypertensive and CHF medications. Review of the systems is negative for headache, dizziness, chest pain, palpitations, nausea, vomit, diarrhea, dysuria, frequency, urgency and other significant abdomen complaints.     ED VITALS   · BP Systolic	 94 mm Hg  · BP Diastolic	 58 mm Hg  · Heart Rate	96 /min  · Respiration Rate (breaths/min)	18 /min  · Temp (F)	97.8 Degrees F  · Temp (C)	36.6 Degrees C  · Temp site	oral  · SpO2 (%)	97 %  · O2 Delivery/Oxygen Delivery Method	room air  · Temp at ED Arrival (C)	36.6 Degrees C    Labs    hb 7.9, trops 52, 58, creat 1.8, bnp 2100, gfr 39    IMAGING    xray chest:   Bilateral opacities/left pleural effusion. Re-demonstration cardiomegaly. (30 Dec 2024 18:01)    REVIEW OF SYSTEMS: see cc/HPI   CONSTITUTIONAL: No weakness, fevers or chills  EYES/ENT: No visual changes;  No vertigo or throat pain   NECK: No pain or stiffness  RESPIRATORY: No cough, wheezing, hemoptysis; No shortness of breath  CARDIOVASCULAR: No chest pain or palpitations  GASTROINTESTINAL: No abdominal or epigastric pain. No nausea, vomiting, or hematemesis; No diarrhea or constipation. No melena or hematochezia.  GENITOURINARY: No dysuria, frequency or hematuria  NEUROLOGICAL: No numbness or weakness  SKIN: No itching, rashes  ENDO: No hyperglycemia, No thyroid disorder, No dyslipidemia   HEM: No bleeding, No easy bruising, No anemia   PSYCHE: No psychosis, No mood disorder No hallucinations No delusion   MSK: No deformity, No fracture, No Joint swelling    Physical Exam:  General: WN/WD NAD  Neurology: A&Ox3, nonfocal, follows commands  Eyes: PERRLA/ EOMI  ENT/Neck: Neck supple, trachea midline, No JVD  Respiratory: CTA B/L, No wheezing, rales, rhonchi  CV: Normal rate regular rhythm, S1S2, no murmurs, rubs or gallops  Abdominal: Soft, NT, ND +BS,   Extremities: No edema, + peripheral pulses  Skin: No Rashes, Hematoma, Ecchymosis    A/p  suspected cardiogenic shock vs. hypovolemic 2/2 blood loss   Acute on chronic HFpEF w/ underlying pHTN and VHD     SAMANTHA on CKD     Acute on chronic blood loss w/ hypotension     HTN   Chronic A. fib on A/c   -hold antihypertensives and Eliquis     DVT prophylaxis - on Eliquis    NOTE INCOMPLETE     PATIENT SEEN by Attending 12/30    75 minutes spent on review of labs, imaging studies, old records, obtaining history, personally examining patient, counselling and communicating with patient/ family, entering orders for medications/tests/etc, discussions with other health care providers, documentation in electronic health records, independent interpretation of labs, imaging/procedure results and care coordination. HPI:  74y Male with PMH of HTN, AVR x 3 (1996, 2011, and 2012), chronic Afib, s/p CRT for CHB in 2018, stenosis of bioprosthetic aortic valve came to the ed for shortness of breath. Hx taken from the patient and is aox3 at time of the interview. Hx also provided by the daughter and wife at bedside. States that for the past few days, he has been worsening of his shortness of breath especially on exertion. Recently he has been so short of breath that he is now using pillows under his head during night, and cant lie flat and have to incline his bed up.  Family states that patient is also looking more pale than his baseline. He states for the past 2 days his blood pressure has been too low & 80/38 mmhg) to take his hypertensive and CHF medications. Review of the systems is negative for headache, dizziness, chest pain, palpitations, nausea, vomit, diarrhea, dysuria, frequency, urgency and other significant abdomen complaints.     ED VITALS   · BP Systolic	 94 mm Hg  · BP Diastolic	 58 mm Hg  · Heart Rate	96 /min  · Respiration Rate (breaths/min)	18 /min  · Temp (F)	97.8 Degrees F  · Temp (C)	36.6 Degrees C  · Temp site	oral  · SpO2 (%)	97 %  · O2 Delivery/Oxygen Delivery Method	room air  · Temp at ED Arrival (C)	36.6 Degrees C    Labs    hb 7.9, trops 52, 58, creat 1.8, bnp 2100, gfr 39    IMAGING    xray chest:   Bilateral opacities/left pleural effusion. Re-demonstration cardiomegaly. (30 Dec 2024 18:01)    REVIEW OF SYSTEMS: see cc/HPI   CONSTITUTIONAL: No weakness, fevers or chills  EYES/ENT: No visual changes;  No vertigo or throat pain   NECK: No pain or stiffness  RESPIRATORY: No cough, wheezing, hemoptysis; No shortness of breath  CARDIOVASCULAR: No chest pain or palpitations  GASTROINTESTINAL: No abdominal or epigastric pain. No nausea, vomiting, or hematemesis; No diarrhea or constipation. No melena or hematochezia.  GENITOURINARY: No dysuria, frequency or hematuria  NEUROLOGICAL: No numbness or weakness  SKIN: No itching, rashes  ENDO: No hyperglycemia, No thyroid disorder, No dyslipidemia   HEM: No bleeding, No easy bruising, No anemia   PSYCHE: No psychosis, No mood disorder No hallucinations No delusion   MSK: No deformity, No fracture, No Joint swelling    Physical Exam:  General: WN/WD NAD  Neurology: A&Ox3, nonfocal, follows commands  Eyes: PERRLA/ EOMI  ENT/Neck: Neck supple, trachea midline, No JVD  Respiratory: CTA B/L, No wheezing, rales, rhonchi  CV: Normal rate regular rhythm, S1S2, no murmurs, rubs or gallops  Abdominal: Soft, NT, ND +BS,   Extremities: No edema, + peripheral pulses  Skin: No Rashes, Hematoma, Ecchymosis    A/p  suspected cardiogenic shock vs. hypovolemic 2/2 blood loss   Acute on chronic HFpEF w/ underlying pHTN and VHD   H/o HTN   -hold Metoprolol / Entresto./ Torsemide   -IV bumex post PRBC / Is and Os / Fluid and salt restriction/Daily weights   -keep Hgb >8   -Heart failure team eval   -Cardiology eval    SAMANTHA on CKD   -hold Entresto    Acute on chronic blood loss w/ hypotension  Chronic A. fib on A/c   -hold antihypertensives and Eliquis   -NPO / IV PPI BID    -hold Eliquis   -GI eval   -serial CBC/ active T&S     DVT prophylaxis - SCDs to LE while in BED. (Eliquis on HOLD)     PATIENT SEEN by Attending 12/30    75 minutes spent on review of labs, imaging studies, old records, obtaining history, personally examining patient, counselling and communicating with patient/ family, entering orders for medications/tests/etc, discussions with other health care providers, documentation in electronic health records, independent interpretation of labs, imaging/procedure results and care coordination.

## 2024-12-30 NOTE — PATIENT PROFILE ADULT - FUNCTIONAL ASSESSMENT - DAILY ACTIVITY 3.
[phone]      The patient is taking a baby Asprin every night. Can she take that and 2 Aleve a day? 4 = No assist / stand by assistance

## 2024-12-30 NOTE — CONSULT NOTE ADULT - ATTENDING COMMENTS
75 y/o M with h/o HFpEF, group 2 PH, AVR x2 and MVR, CHB c/p CRT, afib on a/c coming with c/o hypotension and hypoxia that started a few days ago. He is undergoing work up for PH and RHC performed 12/16 was c/w pre and post capillary PH, TTE on 12/20 showed severe AS and severe MR. He stopped taking Entresto two days ago due to hypotension. He reports feeling slightly better after arriving to the hospital; SBP in 90s. He is overloaded on exam. Blood work shows anemia with Hb 7.9 gr/dl from 12.2 on 12/16.     Agree with GI consult   Get iron profile and replete if ferritin <100 or 100-300 with TSAT <20%   Hold Entresto   GIve Bumex 2 mg iv push with blood transfusion   Structural cardiology evaluation while inpatient   Evaluate for possible need of home O2   Discussed with patient and family at bedside
Reported dark stool, no BMs on admission and rectal examination shows brown stool. Given drop in Hgb would plan EGD after optimization by cardiology given HR failure.

## 2024-12-30 NOTE — H&P ADULT - NSICDXPASTMEDICALHX_GEN_ALL_CORE_FT
PAST MEDICAL HISTORY:  Atrial fibrillation     DM (diabetes mellitus)     Dyslipidemia     H/O endocarditis     H/O: osteoarthritis     History of BPH     Hypertension

## 2024-12-30 NOTE — H&P ADULT - ASSESSMENT
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   - TTE-12/14/2024: Reported history of AVR x 3. Peak transaortic velocity is 4.23 m/s, peak/mean transaortic gradient is 72/46 mmHg with an LVOT/aortic valve VTI ratio of 0.25. EOA 0.96 , moderate eccentric AI, mod to severe MR, moderate pulm HTN   - plan  - IV bumex 1 mg following unit of pRBCs, further dose of diuretics based on assessment of volume status   - hold entresto and metoprolol   - Trend Trops   - Serial EKGs  - Admit to tele unit   - Daily weight  - Strict In/Out record       #Acute on chronic anemia likely due to GI bleed   - XIMENA in ED positive   - Hemoglobin: 7.9 g/dL (12.30.24 @ 11:45) Hemoglobin: 12.2 g/dL (12.16.24 @ 08:06)   -  Last use of eliquis 2 days ago  Plan  - Transfuse 1 unit PRBC   - start IV PPI BID   - clear liquid diet for now  - NPO after midnight  - As per GI team---- Will plan for EGD when optimized. Request cardiology risk stratification and clearance   - Maintain active Type and screen  - Trend H&H BID  - Please place x2 18G IVs  - Please target Hb  >8  - Please avoid any NSAIDs  - If any unstable bleed, please call GI stat    #HTN  #AVR   #Chronic afib   - Hold Metoprolol  - Hold Eliquis    DVT PPX: none   GI PPX: pantoprazole   DIET: clear liquid diet   ACTIVITY:   CODE STATUS: full    DISPOSITION:     PENDING:   med rec confirmed with patient family at bedside             74y Male with PMH of HTN, AVR x 3 (1996, 2011, and 2012), chronic Afib, s/p CRT for CHB in 2018, stenosis of bioprosthetic aortic valve came to the ed for shortness of breath, and hypotension         #Hypotension likely due to worsening of heart failure vs Medication induced hypotension   #Acute on chronic HFpEF 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   - TTE-12/14/2024: Reported history of AVR x 3. Peak transaortic velocity is 4.23 m/s, peak/mean transaortic gradient is 72/46 mmHg with an LVOT/aortic valve VTI ratio of 0.25. EOA 0.96 , moderate eccentric AI, mod to severe MR, moderate pulm HTN   - plan  - IV bumex 1 mg following unit of pRBCs, further dose of diuretics based on assessment of volume status   - hold entresto and metoprolol , torsemide , entresto   - Trend Trops   - Serial EKGs  - Admit to tele unit   - Daily weight  - Strict In/Out record       #Acute on chronic anemia likely due to GI bleed   - XIMENA in ED positive   - Hemoglobin: 7.9 g/dL (12.30.24 @ 11:45) Hemoglobin: 12.2 g/dL (12.16.24 @ 08:06)   -  Last use of eliquis 2 days ago  Plan  - Transfuse 1 unit PRBC   - start IV PPI BID   - clear liquid diet for now  - NPO after midnight  - As per GI team---- Will plan for EGD when optimized. Request cardiology risk stratification and clearance   - Maintain active Type and screen  - Trend H&H BID  - Please place x2 18G IVs  - Please target Hb  >8  - Please avoid any NSAIDs  - If any unstable bleed, please call GI stat    #HTN  #AVR   #Chronic afib   - Hold Metoprolol  - Hold Eliquis, aspirin     DVT PPX: none   GI PPX: pantoprazole   DIET: clear liquid diet   ACTIVITY:   CODE STATUS: full    DISPOSITION:     PENDING:   med rec confirmed with patient family at bedside

## 2024-12-30 NOTE — H&P ADULT - HISTORY OF PRESENT ILLNESS
74y Male with PMH of HTN, AVR x 3 (1996, 2011, and 2012), chronic Afib, s/p CRT for CHB in 2018, stenosis of bioprosthetic aortic valve came to the ed for shortness of breath. Hx taken from the patient and is aox3 at time of the interview. Hx also provided by the daughter and wife at bedside. States that for the past few days, he has been worsening of his shortness of breath especially on exertion. Recently he has been so short of breath that he is now using pillows under his head during night, and cant lie flat and have to incline his bed up.  Family states that patient is also looking more pale than his baseline. He states for the past 2 days his blood pressure has been too low & 80/38 mmhg) to take his hypertensive and CHF medications. Review of the systems is negative for headache, dizziness, chest pain, palpitations, nausea, vomit, diarrhea, dysuria, frequency, urgency and other significant abdomen complaints.     ED VITALS   · BP Systolic	 94 mm Hg  · BP Diastolic	 58 mm Hg  · Heart Rate	96 /min  · Respiration Rate (breaths/min)	18 /min  · Temp (F)	97.8 Degrees F  · Temp (C)	36.6 Degrees C  · Temp site	oral  · SpO2 (%)	97 %  · O2 Delivery/Oxygen Delivery Method	room air  · Temp at ED Arrival (C)	36.6 Degrees C    Labs    hb 7.9, trops 52, 58, creat 1.8, bnp 2100, gfr 39    IMAGING    xray chest:   Bilateral opacities/left pleural effusion. Redemonstration cardiomegaly.

## 2024-12-30 NOTE — PATIENT PROFILE ADULT - FALL HARM RISK - HARM RISK INTERVENTIONS

## 2024-12-30 NOTE — CONSULT NOTE ADULT - ASSESSMENT
74y Male with PMH of HTN, AVR x 3 (1996, 2011, and 2012), chronic Afib, s/p CRT for CHB in 2018, stenosis of bioprosthetic aortic valve came to the ed for shortness of breath.    Impression:  Acute blood loss- possible GI bleed  Prosthetic Severe AS  Mod AI  Mod Severe MR  Afib  CHB s/p CRT  HFpEF  SAMANTHA    Plan:    * Patient-based characteristics (Functional capacity)  Patient is able to achieve more than 4 METS (walk 4 blocks, climb 2 flights of stairs, etc...)          Y [X] / N []    High-risk patient features:  - Recent (<30 days) or active MI          Y [] / N [X]  - Unstable or severe angina          Y [] / N [X]  - Decompensated heart failure, or worsening or new-onset heart failure          Y [X] / N []  - Severe valvular disease          Y [X] / N []  - Significant arrhythmia (Tachy- or Bradyarrhythmia)          Y [] / N [X]    * Surgery/Procedure-based characteristics (Type of surgery)  - Low-risk procedure (outpatient procedure, elective, endoscopy, etc...)          Y [] / N []      * Revised Cardiac Risk Index (RCRI)  1- History of ischemic heart disease          Y [] / N [X]  2- History of congestive heart failure          Y [X] / N []  3- History of stroke/TIA          Y [] / N [X]  4- History of insulin-dependent diabetes          Y [] / N [X]  5- Chronic kidney disease (Cr >2mg/dL)          Y [] / N [X]  6- Undergoing suprainguinal vascular, intraperitoneal, or intrathoracic surgery          Y [] / N [X]    Class II risk (One factor) --> 6% risk (30-day risk of death, MI, or cardiac arrest)      * IMPRESSION & RECOMMENDATIONS:  Agree with bumex 2 mg IV for diuresis    - This consult serves only as a bill-operative cardiac risk stratification and evaluation to predict 30-days cardiac complications risk and mortality. The decision to proceed with the surgery/procedure is made by the performing physician and the patient -

## 2024-12-30 NOTE — H&P ADULT - NSHPPHYSICALEXAM_GEN_ALL_CORE
LOS:     VITALS:   T(C): 36.6 (12-30-24 @ 10:27), Max: 36.6 (12-30-24 @ 10:27)  HR: 96 (12-30-24 @ 10:27) (96 - 96)  BP: 103/54 (12-30-24 @ 13:15) (94/58 - 103/54)  RR: 18 (12-30-24 @ 10:27) (18 - 18)  SpO2: 97% (12-30-24 @ 10:27) (97% - 97%)    GENERAL: NAD, lying in bed comfortably  HEAD:  Atraumatic, Normocephalic  EYES: EOMI, PERRLA, conjunctiva and sclera clear  ENT: Moist mucous membranes  NECK: Supple, No JVD  CHEST/LUNG: decreased breath sound B/L bases and lung crackles +++,   HEART: loud systolic murmur +++  ABDOMEN: BSx4; Soft, nontender, nondistended  EXTREMITIES:  2+ pitting  edema  NERVOUS SYSTEM:  A&Ox3, no focal deficits   SKIN: No rashes or lesions

## 2024-12-30 NOTE — ED ADULT TRIAGE NOTE - CHIEF COMPLAINT QUOTE
"I have chf and my feet are swollen but my blood pressure has been so low recently so I stopped my bp meds and I stopped the lasix yesterday. I sob even after 10 steps"

## 2024-12-30 NOTE — ED PROVIDER NOTE - CLINICAL SUMMARY MEDICAL DECISION MAKING FREE TEXT BOX
Patient presents with dyspnea on exertion shortness of breath and mildly low blood pressure.  Initial concern for fluid over the however patient found to have a hemoglobin drop from 12 to 7.9. Rectal exam demonstrates melena.  Patient given Protonix GI consulted and patient mated.  Initial troponin 52 repeat troponin 58.

## 2024-12-30 NOTE — ED PROVIDER NOTE - OBJECTIVE STATEMENT
Patient is a 74-year-old male with past medical history of hypertension, hyperlipidemia, CHF, A-fib on Eliquis, pacemaker placement presents for evaluation after referral from Dr. Willis.  He states for the past 2 days his blood pressure has been too low to take his hypertensive and CHF medications causing increasing foot swelling, dyspnea on exertion, shortness of breath and hypoxia measured at 80% at home after walking upstairs.  He denies any complaints at rest including chest pain, shortness of breath, abdominal pain, urinary complaints, fever, cough, sore throat, chills.

## 2024-12-30 NOTE — ED PROVIDER NOTE - PROGRESS NOTE DETAILS
KA - hgb from 12-> 7, patient denies known bleeding, still taking eliquis. Rectal exam performed with PCA, +melena. GI consulted, plan to admit. KA - patient with troponin delta, cardio consulted via teams. Will admit to telemetry, 1 PRBC ordered, patient VSS.  Cardio Dr Daniel requested admitting team to place consult, MAR aware.

## 2024-12-30 NOTE — ED PROVIDER NOTE - PHYSICAL EXAMINATION
As Follows:  CONST: Well appearing in NAD  EYES: PERRL, EOMI, Sclera and conjunctiva clear.   ENT: No nasal discharge. Oropharynx normal appearing, no erythema / exudates. Uvula midline. Airway intact.   CARD: Systolic murmur; Normal rate and rhythm  RESP: BS Equal B/L, No wheezes, rhonchi or rales. No distress or accessory breathing  GI: Soft, non-tender, non-distended.  SKIN: Mild pedal edema bilaterally. Warm, dry, no acute rashes. MMM  NEURO: Alert and Oriented, No focal deficits. Strength and sensation intact. Steady gait

## 2024-12-30 NOTE — CONSULT NOTE ADULT - ASSESSMENT
74-year-old male with past medical history of hypertension, hyperlipidemia, CHF, A-fib on Eliquis, pacemaker placement presents for evaluation after referral from Dr. Willis.  He states for the past 2 days his blood pressure has been too low to take his hypertensive and CHF medications causing increasing foot swelling, dyspnea on exertion, shortness of breath and hypoxia measured at 80% at home after walking upstairs. Gi consulted for reported melena and acute anemia. Patient denies any blood per rectum or dark colored stools. Hb was noted to be 7.9 from 12 2 weeks ago. XIMENA performed by GI team shows dark stools, brown on smear. Patient has reported constipation within the last month. Denies dysphagia, abdominal pain, nausea, vomiting diarrhea, rectal pain. Denies NSAID use. Denies weight loss, fam hx gi malignancy. States had colonoscopy several years ago, was unremarkable. Had EGD over 15 years ago which reportedly also unremarkable.       #Acute anemia / dark colored stools - rule out upper GI bleed  - Sent by cardiologist, recent RHC, shortness of breath  - Denies overt bleeding  - Hemodynamically stable & no active bleeding  - XIMENA shows dark stool, brown on smear  - Baseline hemoglobin - 12  - Hemoglobin on admission - 7.9  - Coags - 1.16  - Last use of eliquis 2 days ago  - POCUS in ED showing Pulmonary edema Left sided small pleural effusion    #Rec  - Transfuse 1 unit PRBC   - start IV PPI BID   - clear liquid diet for now  - NPO after midnight  - Will plan for EGD when optimized. Request cardiology risk stratification and clearance   - Maintain active Type and screen  - Trend H&H BID  - Please place x2 18G IVs  - Please target Hb  >8  - Please avoid any NSAIDs  - If any unstable bleed, please call GI stat

## 2024-12-30 NOTE — H&P ADULT - NSICDXPASTSURGICALHX_GEN_ALL_CORE_FT
PAST SURGICAL HISTORY:  H/O aortic aneurysm repair     H/O knee surgery     S/P AVR (aortic valve replacement) complicated by aortic aneurysm and infective endocarditis    S/P caroline

## 2024-12-31 LAB
ALBUMIN SERPL ELPH-MCNC: 4.1 G/DL — SIGNIFICANT CHANGE UP (ref 3.5–5.2)
ALBUMIN SERPL ELPH-MCNC: 4.1 G/DL — SIGNIFICANT CHANGE UP (ref 3.5–5.2)
ALP SERPL-CCNC: 38 U/L — SIGNIFICANT CHANGE UP (ref 30–115)
ALP SERPL-CCNC: 39 U/L — SIGNIFICANT CHANGE UP (ref 30–115)
ALT FLD-CCNC: 10 U/L — SIGNIFICANT CHANGE UP (ref 0–41)
ALT FLD-CCNC: 10 U/L — SIGNIFICANT CHANGE UP (ref 0–41)
ANION GAP SERPL CALC-SCNC: 13 MMOL/L — SIGNIFICANT CHANGE UP (ref 7–14)
ANION GAP SERPL CALC-SCNC: 17 MMOL/L — HIGH (ref 7–14)
AST SERPL-CCNC: 23 U/L — SIGNIFICANT CHANGE UP (ref 0–41)
AST SERPL-CCNC: 25 U/L — SIGNIFICANT CHANGE UP (ref 0–41)
BASOPHILS # BLD AUTO: 0.04 K/UL — SIGNIFICANT CHANGE UP (ref 0–0.2)
BASOPHILS # BLD AUTO: 0.05 K/UL — SIGNIFICANT CHANGE UP (ref 0–0.2)
BASOPHILS # BLD AUTO: 0.05 K/UL — SIGNIFICANT CHANGE UP (ref 0–0.2)
BASOPHILS NFR BLD AUTO: 0.5 % — SIGNIFICANT CHANGE UP (ref 0–1)
BASOPHILS NFR BLD AUTO: 0.6 % — SIGNIFICANT CHANGE UP (ref 0–1)
BASOPHILS NFR BLD AUTO: 0.7 % — SIGNIFICANT CHANGE UP (ref 0–1)
BILIRUB SERPL-MCNC: 1.5 MG/DL — HIGH (ref 0.2–1.2)
BILIRUB SERPL-MCNC: 1.6 MG/DL — HIGH (ref 0.2–1.2)
BUN SERPL-MCNC: 34 MG/DL — HIGH (ref 10–20)
BUN SERPL-MCNC: 36 MG/DL — HIGH (ref 10–20)
CALCIUM SERPL-MCNC: 9 MG/DL — SIGNIFICANT CHANGE UP (ref 8.4–10.5)
CALCIUM SERPL-MCNC: 9.1 MG/DL — SIGNIFICANT CHANGE UP (ref 8.4–10.5)
CHLORIDE SERPL-SCNC: 103 MMOL/L — SIGNIFICANT CHANGE UP (ref 98–110)
CHLORIDE SERPL-SCNC: 105 MMOL/L — SIGNIFICANT CHANGE UP (ref 98–110)
CO2 SERPL-SCNC: 20 MMOL/L — SIGNIFICANT CHANGE UP (ref 17–32)
CO2 SERPL-SCNC: 22 MMOL/L — SIGNIFICANT CHANGE UP (ref 17–32)
CREAT SERPL-MCNC: 1.6 MG/DL — HIGH (ref 0.7–1.5)
CREAT SERPL-MCNC: 1.7 MG/DL — HIGH (ref 0.7–1.5)
EGFR: 42 ML/MIN/1.73M2 — LOW
EGFR: 45 ML/MIN/1.73M2 — LOW
EOSINOPHIL # BLD AUTO: 0.49 K/UL — SIGNIFICANT CHANGE UP (ref 0–0.7)
EOSINOPHIL # BLD AUTO: 0.5 K/UL — SIGNIFICANT CHANGE UP (ref 0–0.7)
EOSINOPHIL # BLD AUTO: 0.51 K/UL — SIGNIFICANT CHANGE UP (ref 0–0.7)
EOSINOPHIL NFR BLD AUTO: 5.7 % — SIGNIFICANT CHANGE UP (ref 0–8)
EOSINOPHIL NFR BLD AUTO: 6.1 % — SIGNIFICANT CHANGE UP (ref 0–8)
EOSINOPHIL NFR BLD AUTO: 6.7 % — SIGNIFICANT CHANGE UP (ref 0–8)
GLUCOSE SERPL-MCNC: 106 MG/DL — HIGH (ref 70–99)
GLUCOSE SERPL-MCNC: 97 MG/DL — SIGNIFICANT CHANGE UP (ref 70–99)
HCT VFR BLD CALC: 26.6 % — LOW (ref 42–52)
HCT VFR BLD CALC: 26.7 % — LOW (ref 42–52)
HCT VFR BLD CALC: 28.7 % — LOW (ref 42–52)
HGB BLD-MCNC: 8.6 G/DL — LOW (ref 14–18)
HGB BLD-MCNC: 8.8 G/DL — LOW (ref 14–18)
HGB BLD-MCNC: 9.3 G/DL — LOW (ref 14–18)
IMM GRANULOCYTES NFR BLD AUTO: 0.5 % — HIGH (ref 0.1–0.3)
IMM GRANULOCYTES NFR BLD AUTO: 0.5 % — HIGH (ref 0.1–0.3)
IMM GRANULOCYTES NFR BLD AUTO: 0.7 % — HIGH (ref 0.1–0.3)
LYMPHOCYTES # BLD AUTO: 1.06 K/UL — LOW (ref 1.2–3.4)
LYMPHOCYTES # BLD AUTO: 1.21 K/UL — SIGNIFICANT CHANGE UP (ref 1.2–3.4)
LYMPHOCYTES # BLD AUTO: 1.21 K/UL — SIGNIFICANT CHANGE UP (ref 1.2–3.4)
LYMPHOCYTES # BLD AUTO: 12.3 % — LOW (ref 20.5–51.1)
LYMPHOCYTES # BLD AUTO: 14.6 % — LOW (ref 20.5–51.1)
LYMPHOCYTES # BLD AUTO: 15.9 % — LOW (ref 20.5–51.1)
MAGNESIUM SERPL-MCNC: 2.1 MG/DL — SIGNIFICANT CHANGE UP (ref 1.8–2.4)
MCHC RBC-ENTMCNC: 30 PG — SIGNIFICANT CHANGE UP (ref 27–31)
MCHC RBC-ENTMCNC: 30.1 PG — SIGNIFICANT CHANGE UP (ref 27–31)
MCHC RBC-ENTMCNC: 30.2 PG — SIGNIFICANT CHANGE UP (ref 27–31)
MCHC RBC-ENTMCNC: 32.3 G/DL — SIGNIFICANT CHANGE UP (ref 32–37)
MCHC RBC-ENTMCNC: 32.4 G/DL — SIGNIFICANT CHANGE UP (ref 32–37)
MCHC RBC-ENTMCNC: 33 G/DL — SIGNIFICANT CHANGE UP (ref 32–37)
MCV RBC AUTO: 91.8 FL — SIGNIFICANT CHANGE UP (ref 80–94)
MCV RBC AUTO: 92.7 FL — SIGNIFICANT CHANGE UP (ref 80–94)
MCV RBC AUTO: 92.9 FL — SIGNIFICANT CHANGE UP (ref 80–94)
MONOCYTES # BLD AUTO: 0.77 K/UL — HIGH (ref 0.1–0.6)
MONOCYTES # BLD AUTO: 0.83 K/UL — HIGH (ref 0.1–0.6)
MONOCYTES # BLD AUTO: 1.22 K/UL — HIGH (ref 0.1–0.6)
MONOCYTES NFR BLD AUTO: 10.9 % — HIGH (ref 1.7–9.3)
MONOCYTES NFR BLD AUTO: 14.8 % — HIGH (ref 1.7–9.3)
MONOCYTES NFR BLD AUTO: 8.9 % — SIGNIFICANT CHANGE UP (ref 1.7–9.3)
NEUTROPHILS # BLD AUTO: 4.94 K/UL — SIGNIFICANT CHANGE UP (ref 1.4–6.5)
NEUTROPHILS # BLD AUTO: 5.24 K/UL — SIGNIFICANT CHANGE UP (ref 1.4–6.5)
NEUTROPHILS # BLD AUTO: 6.22 K/UL — SIGNIFICANT CHANGE UP (ref 1.4–6.5)
NEUTROPHILS NFR BLD AUTO: 63.4 % — SIGNIFICANT CHANGE UP (ref 42.2–75.2)
NEUTROPHILS NFR BLD AUTO: 65.1 % — SIGNIFICANT CHANGE UP (ref 42.2–75.2)
NEUTROPHILS NFR BLD AUTO: 72.1 % — SIGNIFICANT CHANGE UP (ref 42.2–75.2)
NRBC # BLD: 0 /100 WBCS — SIGNIFICANT CHANGE UP (ref 0–0)
PLATELET # BLD AUTO: 185 K/UL — SIGNIFICANT CHANGE UP (ref 130–400)
PLATELET # BLD AUTO: 199 K/UL — SIGNIFICANT CHANGE UP (ref 130–400)
PLATELET # BLD AUTO: 209 K/UL — SIGNIFICANT CHANGE UP (ref 130–400)
PMV BLD: 11.5 FL — HIGH (ref 7.4–10.4)
PMV BLD: 11.9 FL — HIGH (ref 7.4–10.4)
PMV BLD: 12.2 FL — HIGH (ref 7.4–10.4)
POTASSIUM SERPL-MCNC: 4.2 MMOL/L — SIGNIFICANT CHANGE UP (ref 3.5–5)
POTASSIUM SERPL-MCNC: 4.5 MMOL/L — SIGNIFICANT CHANGE UP (ref 3.5–5)
POTASSIUM SERPL-SCNC: 4.2 MMOL/L — SIGNIFICANT CHANGE UP (ref 3.5–5)
POTASSIUM SERPL-SCNC: 4.5 MMOL/L — SIGNIFICANT CHANGE UP (ref 3.5–5)
PROT SERPL-MCNC: 6 G/DL — SIGNIFICANT CHANGE UP (ref 6–8)
PROT SERPL-MCNC: 6.2 G/DL — SIGNIFICANT CHANGE UP (ref 6–8)
RBC # BLD: 2.87 M/UL — LOW (ref 4.7–6.1)
RBC # BLD: 2.91 M/UL — LOW (ref 4.7–6.1)
RBC # BLD: 3.09 M/UL — LOW (ref 4.7–6.1)
RBC # FLD: 15.3 % — HIGH (ref 11.5–14.5)
RBC # FLD: 15.6 % — HIGH (ref 11.5–14.5)
RBC # FLD: 15.8 % — HIGH (ref 11.5–14.5)
SODIUM SERPL-SCNC: 140 MMOL/L — SIGNIFICANT CHANGE UP (ref 135–146)
SODIUM SERPL-SCNC: 140 MMOL/L — SIGNIFICANT CHANGE UP (ref 135–146)
TROPONIN T, HIGH SENSITIVITY RESULT: 56 NG/L — CRITICAL HIGH (ref 6–21)
TROPONIN T, HIGH SENSITIVITY RESULT: 58 NG/L — CRITICAL HIGH (ref 6–21)
WBC # BLD: 7.59 K/UL — SIGNIFICANT CHANGE UP (ref 4.8–10.8)
WBC # BLD: 8.26 K/UL — SIGNIFICANT CHANGE UP (ref 4.8–10.8)
WBC # BLD: 8.62 K/UL — SIGNIFICANT CHANGE UP (ref 4.8–10.8)
WBC # FLD AUTO: 7.59 K/UL — SIGNIFICANT CHANGE UP (ref 4.8–10.8)
WBC # FLD AUTO: 8.26 K/UL — SIGNIFICANT CHANGE UP (ref 4.8–10.8)
WBC # FLD AUTO: 8.62 K/UL — SIGNIFICANT CHANGE UP (ref 4.8–10.8)

## 2024-12-31 PROCEDURE — 99233 SBSQ HOSP IP/OBS HIGH 50: CPT

## 2024-12-31 PROCEDURE — 74018 RADEX ABDOMEN 1 VIEW: CPT | Mod: 26

## 2024-12-31 RX ORDER — BUMETANIDE 2 MG/1
1 TABLET ORAL DAILY
Refills: 0 | Status: DISCONTINUED | OUTPATIENT
Start: 2024-12-31 | End: 2025-01-02

## 2024-12-31 RX ORDER — HEPARIN SODIUM 1000 [USP'U]/ML
5000 INJECTION, SOLUTION INTRAVENOUS; SUBCUTANEOUS EVERY 12 HOURS
Refills: 0 | Status: DISCONTINUED | OUTPATIENT
Start: 2024-12-31 | End: 2024-12-31

## 2024-12-31 RX ORDER — POLYETHYLENE GLYCOL 3350 17 G/DOSE
17 POWDER (GRAM) ORAL ONCE
Refills: 0 | Status: COMPLETED | OUTPATIENT
Start: 2024-12-31 | End: 2024-12-31

## 2024-12-31 RX ORDER — POLYETHYLENE GLYCOL 3350 17 G/DOSE
17 POWDER (GRAM) ORAL
Refills: 0 | Status: DISCONTINUED | OUTPATIENT
Start: 2024-12-31 | End: 2025-01-03

## 2024-12-31 RX ORDER — SENNOSIDES 8.6 MG/1
2 TABLET, FILM COATED ORAL AT BEDTIME
Refills: 0 | Status: DISCONTINUED | OUTPATIENT
Start: 2024-12-31 | End: 2025-01-03

## 2024-12-31 RX ADMIN — ROSUVASTATIN 10 MILLIGRAM(S): 40 TABLET, FILM COATED ORAL at 21:32

## 2024-12-31 RX ADMIN — PANTOPRAZOLE 40 MILLIGRAM(S): 40 TABLET, DELAYED RELEASE ORAL at 06:01

## 2024-12-31 RX ADMIN — Medication 17 GRAM(S): at 15:22

## 2024-12-31 RX ADMIN — FENOFIBRATE 48 MILLIGRAM(S): 48 TABLET ORAL at 21:32

## 2024-12-31 RX ADMIN — PANTOPRAZOLE 40 MILLIGRAM(S): 40 TABLET, DELAYED RELEASE ORAL at 17:58

## 2024-12-31 RX ADMIN — BUMETANIDE 1 MILLIGRAM(S): 2 TABLET ORAL at 18:15

## 2024-12-31 RX ADMIN — SENNOSIDES 2 TABLET(S): 8.6 TABLET, FILM COATED ORAL at 21:31

## 2024-12-31 RX ADMIN — LATANOPROST 1 DROP(S): 50 SOLUTION OPHTHALMIC at 21:33

## 2024-12-31 NOTE — PROGRESS NOTE ADULT - ASSESSMENT
74y Male with PMH of HTN, AVR x 3 (1996, 2011, and 2012), chronic Afib on eliquis, s/p CRT for CHB in 2018, stenosis of bioprosthetic aortic valve came to the ed for shortness of breath, and hypotension.    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   - TTE-12/14/2024: Reported history of AVR x 3. Peak transaortic velocity is 4.23 m/s, peak/mean transaortic gradient is 72/46 mmHg with an LVOT/aortic valve VTI ratio of 0.25. EOA 0.96 , moderate eccentric AI, mod to severe MR, moderate pulm HTN   - IV bumex following 1 unit of pRBCs on 12/30, further dose of diuretics based on assessment of volume status   - hold entresto and metoprolol , torsemide  - Trend Trops same  - Admitted to tele unit   - Daily weight  - Strict In/Out record   - Pending structural cardiology cs and HF cardio f/u, with risk assessment for EGD      #Acute on chronic anemia likely due to GI bleed   - XIMENA in ED positive   - Hemoglobin: 7.9 g/dL (12.30.24 @ 11:45) Hemoglobin: 12.2 g/dL (12.16.24 @ 08:06)   -  Last use of eliquis 2 days ago  Plan  - Transfused 1 unit PRBC   - started IV PPI BID   - Diet resumed for now  - NPO after midnight  - As per GI team---- plan for EGD on Thursday 1/2 when patient is medically optimized for procedure  - NPO night before procedure  - Please obtain preop labs (coags, CBC, CMP, mag) night before procedure   - Pending cardiology risk stratification and clearance   - Maintain active Type and screen  - Trend H&H BID  - Please place x2 18G IVs  - Please target Hb  >8  - Please avoid any NSAIDs  - If any unstable bleed, please call GI stat    #HTN  #AVR   #Chronic afib   - Hold Metoprolol  - Holding Eliquis, aspirin     DVT PPX: none   GI PPX: pantoprazole   DIET: regular diet   ACTIVITY: as tolerated  CODE STATUS: full    DISPOSITION: floor tele

## 2024-12-31 NOTE — PROGRESS NOTE ADULT - ASSESSMENT
74-year-old male with past medical history of hypertension, hyperlipidemia, CHF, A-fib on Eliquis, pacemaker placement presents for evaluation after referral from Dr. Willis.  He states for the past 2 days his blood pressure has been too low to take his hypertensive and CHF medications causing increasing foot swelling, dyspnea on exertion, shortness of breath and hypoxia measured at 80% at home after walking upstairs. Gi consulted for reported melena and acute anemia. Patient denies any blood per rectum or dark colored stools. Hb was noted to be 7.9 from 12 2 weeks ago. XIMENA performed by GI team shows dark stools, brown on smear. Patient has reported constipation within the last month. Denies dysphagia, abdominal pain, nausea, vomiting diarrhea, rectal pain. Denies NSAID use. Denies weight loss, fam hx gi malignancy. States had colonoscopy several years ago, was unremarkable. Had EGD over 15 years ago which reportedly also unremarkable.       #Acute anemia / dark colored stools - rule out upper GI bleed  #constipation  - Sent by cardiologist, recent RHC, shortness of breath  - Denies overt bleeding  - Hemodynamically stable & no active bleeding  - XIMENA shows dark stool, brown on smear  - Baseline hemoglobin - 12  - Hemoglobin on admission - 7.9  - Coags - 1.16  - Last use of eliquis 2 days ago  - POCUS in ED showing Pulmonary edema Left sided small pleural effusion    #Rec  - cw IV PPI BID   - Advance diet as tolerated  - Will plan for EGD on Thursday 1/2 when patient is medically optimized for procedure  - NPO night before procedure  - Please obtain preop labs (coags, CBC, CMP, mag) night before procedure   - HF eval appreciated, pending cardiology clearance and risk stratification  - Maintain active Type and screen  - Trend H&H BID  - Please place x2 18G IVs  - Please target Hb  >8  - Please avoid any NSAIDs  - If any unstable bleed, please call GI stat

## 2024-12-31 NOTE — CONSULT NOTE ADULT - ASSESSMENT
Known Hx of chronic Aflutter, SSS, PPM  Known Hx of aortic valve disorder, AVR, bioprosthetic, recent diagnosis of moderate severe AS, mild-moderate AI  Known Hx of moderate MR, TR, new detection of pulmonary HTN at 70 mmHg  HFpEF until recently has been stable and euvolemic, in this admission was decompensated as leg edema and small pleural effusion due to holding the medications  Acute blood loss, unknown etiology possibly upper GIB exacerbated by ASA and Eliquis, needs EGD    Keep of DVT prophylaxis  Keep the Hgb at or above >8  F/U with HF team recommendation  continue with 1 mg Bumetanide IV daily as long as BP is acceptable   Will address medical Rx after the EGD and finding,   After addressing current GIB and blood loss then in a more elective basis needs structural assessment for the Aortic valve replacement    For EGD he is stable enough to have the procedure done  Moderate- high risk patient, minor risk procedure, functional capacity less than 4 METS, RCRI score 1, class II  Avoid volume overload, in case manage with Bumetanide 1 mg iv PRN Known Hx of chronic Aflutter, SSS, PPM  Known Hx of aortic valve disorder, AVR, bioprosthetic, recent diagnosis of moderate severe AS, mild-moderate AI  Known Hx of moderate MR, TR, new detection of pulmonary HTN at 70 mmHg  HFpEF until recently has been stable and euvolemic, in this admission was decompensated as leg edema and small pleural effusion due to holding the medications  Acute blood loss, unknown etiology possibly upper GIB exacerbated by ASA and Eliquis, needs EGD    Keep of DVT prophylaxis  Keep the Hgb at or above >8  F/U with HF team recommendation  continue with 1 mg Bumetanide IV daily as long as BP is acceptable   Will address medical Rx after the EGD and finding,   After addressing current GIB and blood loss then in a more elective basis needs structural assessment for the Aortic valve replacement    For EGD he is stable enough to have the procedure done  Moderate- high risk patient, minor risk procedure, functional capacity less than 4 METS, RCRI score 1, class II  Avoid volume overload, in case manage with Bumetanide 1 mg iv PRN    Agree with above. spoke with Dr. Foster over the phone and in agreement.

## 2024-12-31 NOTE — PROGRESS NOTE ADULT - ASSESSMENT
74y Male with PMH of HTN, AVR x 3 (1996, 2011, and 2012), chronic Afib on eliquis, s/p CRT for CHB in 2018, stenosis of bioprosthetic aortic valve came to the ed for shortness of breath, and hypotension.    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   - TTE-12/14/2024: Reported history of AVR x 3. Peak transaortic velocity is 4.23 m/s, peak/mean transaortic gradient is 72/46 mmHg with an LVOT/aortic valve VTI ratio of 0.25. EOA 0.96 , moderate eccentric AI, mod to severe MR, moderate pulm HTN   - IV bumex following 1 unit of pRBCs on 12/30, further dose of diuretics based on assessment of volume status   - hold entresto and metoprolol , torsemide  - Trend Trops same  - Admitted to tele unit   - Daily weight  - Strict In/Out record   - Pending structural cardiology cs and HF cardio f/u, with risk assessment for EGD      #Acute on chronic anemia likely due to GI bleed   - XIMENA in ED positive   - Hemoglobin: 7.9 g/dL (12.30.24 @ 11:45) Hemoglobin: 12.2 g/dL (12.16.24 @ 08:06)   -  Last use of eliquis 2 days ago  Plan  - Transfused 1 unit PRBC   - started IV PPI BID   - Diet resumed for now  - NPO after midnight  - As per GI team---- plan for EGD on Thursday 1/2 when patient is medically optimized for procedure  - NPO night before procedure  - Please obtain preop labs (coags, CBC, CMP, mag) night before procedure   - Pending cardiology risk stratification and clearance   - Maintain active Type and screen  - Trend H&H BID  - Please place x2 18G IVs  - Please target Hb  >8  - Please avoid any NSAIDs  - If any unstable bleed, please call GI stat    #HTN  #AVR   #Chronic afib   - Hold Metoprolol  - Holding Eliquis, aspirin     DVT PPX: none   GI PPX: pantoprazole   DIET: regular diet   ACTIVITY: as tolerated  CODE STATUS: full    DISPOSITION: floor tele     74y Male with PMH of HTN, AVR x 3 (1996, 2011, and 2012), chronic Afib on eliquis, s/p CRT for CHB in 2018, stenosis of bioprosthetic aortic valve came to the ed for shortness of breath, and hypotension.    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   - TTE-12/14/2024: Reported history of AVR x 3. Peak transaortic velocity is 4.23 m/s, peak/mean transaortic gradient is 72/46 mmHg with an LVOT/aortic valve VTI ratio of 0.25. EOA 0.96 , moderate eccentric AI, mod to severe MR, moderate pulm HTN   - IV bumex following 1 unit of pRBCs on 12/30, continue IV bumex 1mg with hold parameters for hypotension    - hold entresto and metoprolol , torsemide  -  Trops stable at 58  - Daily weight  - Strict In/Out record   - Pending structural cardiology  - Cardio consult appreciated - mod-high risk for minor risk procedure      #Acute on chronic anemia likely due to GI bleed   - XIMENA in ED positive   - Hemoglobin: 7.9 g/dL (12.30.24 @ 11:45) Hemoglobin: 12.2 g/dL (12.16.24 @ 08:06)   -  Last use of eliquis 2 days ago  - continue bid h/h  - GI consult appreciated - plan EGD 1/2  - continue protonix     #HTN  #AVR   #Chronic afib   - Hold Metoprolol  - Holding Eliquis, aspirin     #Constipation  -kub noted 12/31  -started senna and mirlax     DVT PPX: none   GI PPX: pantoprazole   DIET: regular diet   ACTIVITY: as tolerated  CODE STATUS: full    DISPOSITION: floor tele  Family: wife updated bedside 12/31  Pending: I+O, h/h bid, structural heart, bp, constipation

## 2024-12-31 NOTE — CONSULT NOTE ADULT - SUBJECTIVE AND OBJECTIVE BOX
Outpt cardiologist:    HISTORY OF PRESENT ILLNESS:  74y Male with PMH of HTN, AVR x 3 (1996, 2011, and 2012), chronic Afib, s/p CRT for CHB in 2018, stenosis of bioprosthetic aortic valve came to the ed for shortness of breath. Hx taken from the patient and is aox3 at time of the interview. Hx also provided by the daughter and wife at bedside. States that for the past few days, he has been worsening of his shortness of breath especially on exertion. Recently he has been so short of breath that he is now using pillows under his head during night, and cant lie flat and have to incline his bed up.  Family states that patient is also looking more pale than his baseline. He states for the past 2 days his blood pressure has been too low & 80/38 mmhg) to take his hypertensive and CHF medications. Review of the systems is negative for headache, dizziness, chest pain, palpitations, nausea, vomit, diarrhea, dysuria, frequency, urgency and other significant abdomen complaints.     ED VITALS   · BP Systolic	 94 mm Hg  · BP Diastolic	 58 mm Hg  · Heart Rate	96 /min  · Respiration Rate (breaths/min)	18 /min  · Temp (F)	97.8 Degrees F  · Temp (C)	36.6 Degrees C  · Temp site	oral  · SpO2 (%)	97 %  · O2 Delivery/Oxygen Delivery Method	room air  · Temp at ED Arrival (C)	36.6 Degrees C    Labs    hb 7.9, trops 52, 58, creat 1.8, bnp 2100, gfr 39    IMAGING    xray chest:   Bilateral opacities/left pleural effusion. Redemonstration cardiomegaly. (30 Dec 2024 18:01)      Cardiology Fellow Notes    Significant Cardiovascular History:    Notable Comorbidities and Risk Factors:     HPI:    Interval Events:     Previous Cardiac Workup      PAST MEDICAL & SURGICAL HISTORY  Atrial fibrillation    H/O: osteoarthritis    Hypertension    Dyslipidemia    History of BPH    H/O endocarditis    DM (diabetes mellitus)    S/P AVR (aortic valve replacement)  complicated by aortic aneurysm and infective endocarditis    H/O aortic aneurysm repair    S/P caroline    H/O knee surgery        FAMILY HISTORY:  FAMILY HISTORY:      SOCIAL HISTORY:  Social History:      ALLERGIES:  No Known Allergies      MEDICATIONS:  dorzolamide 2% Ophthalmic Solution 1 Drop(s) Both EYES <User Schedule>  fenofibrate Tablet 48 milliGRAM(s) Oral at bedtime  latanoprost 0.005% Ophthalmic Solution 1 Drop(s) Both EYES at bedtime  pantoprazole  Injectable 40 milliGRAM(s) IV Push every 12 hours  rosuvastatin 10 milliGRAM(s) Oral at bedtime  timolol 0.25% Solution 1 Drop(s) Both EYES daily    PRN:  acetaminophen     Tablet .. 650 milliGRAM(s) Oral every 6 hours PRN  aluminum hydroxide/magnesium hydroxide/simethicone Suspension 30 milliLiter(s) Oral every 4 hours PRN  melatonin 3 milliGRAM(s) Oral at bedtime PRN  ondansetron Injectable 4 milliGRAM(s) IV Push every 8 hours PRN      HOME MEDICATIONS:  Home Medications:  aspirin 81 mg oral tablet: 1 tab(s) orally once a day (16 Dec 2024 08:19)  Crestor 10 mg oral tablet: 1 tab(s) orally once a day (16 Dec 2024 08:19)  dorzolamide ophthalmic: 1 application to each affected eye once a day (30 Dec 2024 18:33)  Eliquis 5 mg oral tablet: 1 tab(s) orally 2 times a day (16 Dec 2024 08:19)  Entresto 24 mg-26 mg oral tablet: 0.5 tab(s) orally 2 times a day (16 Dec 2024 08:49)  fenofibrate 134 mg oral capsule: 1 cap(s) orally once a day (16 Dec 2024 08:19)  Latanoprost Ophthalmic: 1 application to each affected eye once a day (30 Dec 2024 18:32)  metFORMIN 500 mg oral tablet: 1 tab(s) orally 2 times a day (16 Dec 2024 08:49)  metoprolol succinate 25 mg oral tablet, extended release: 1 tab(s) orally 2 times a day (16 Dec 2024 08:19)  timolol ophthalmic: 1 application to each affected eye once a day (30 Dec 2024 18:33)  torsemide 20 mg oral tablet: 1 tab(s) orally once a day (30 Dec 2024 18:31)  Uroxatral 10 mg oral tablet, extended release: 1 tab(s) orally once a day (16 Dec 2024 08:19)      VITALS:   T(F): 97.6 (12-30 @ 20:19), Max: 97.8 (12-30 @ 10:27)  HR: 97 (12-30 @ 20:19) (96 - 101)  BP: 110/60 (12-30 @ 20:19) (94/58 - 110/60)  BP(mean): --  RR: 18 (12-30 @ 20:19) (18 - 18)  SpO2: 95% (12-30 @ 18:19) (95% - 97%)    I&O's Summary    30 Dec 2024 07:01  -  30 Dec 2024 23:59  --------------------------------------------------------  IN: 0 mL / OUT: 450 mL / NET: -450 mL        REVIEW OF SYSTEMS:  CONSTITUTIONAL: No weakness, fevers or chills  HEENT: No visual changes, neck/ear pain  RESPIRATORY: No cough, sob  CARDIOVASCULAR: See HPI  GASTROINTESTINAL: No abdominal pain. No nausea, vomiting, diarrhea   GENITOURINARY: No dysuria, frequency or hematuria  NEUROLOGICAL: No new focal deficits  SKIN: No new rashes    PHYSICAL EXAM:  *General: Not in distress.  Non-toxic appearing.   *HEENT: EOMI  *Cardio: regular, S1, S2, no murmur  *Pulm: B/L BS.  No wheezing / crackles / rales  *Abdomen: Soft, non-tender, non-distended. Normoactive bowel sounds  *Extremities: No edema b/l le. Warm. Knee caps warm. Pulses + bilaterally. Normal capillary refill.   *Neuro: A&O x3. No focal deficits    LABS:                        8.8    7.90  )-----------( 189      ( 30 Dec 2024 22:24 )             26.6     12-30    138  |  104  |  43[H]  ----------------------------<  130[H]  4.8   |  25  |  1.8[H]    Ca    9.4      30 Dec 2024 11:45  Mg     2.3     12-30    TPro  6.4  /  Alb  4.1  /  TBili  0.5  /  DBili  x   /  AST  23  /  ALT  10  /  AlkPhos  35  12-30    PT/INR - ( 30 Dec 2024 13:50 )   PT: 13.70 sec;   INR: 1.16 ratio         PTT - ( 30 Dec 2024 13:50 )  PTT:32.5 sec          Troponin trend:    52-58        IMAGING:  - CXR:  b/l congestion and left pleural effusion  - TTE:  12/20/2024: EF 70-75%, Severe Prosthetic AS, Mod-severe MR    - CATHETERIZATION:    ECG:  A sensed V paced    TELEMETRY EVENTS:
Cardiologist:    HPI:    73 yo M w h/o HFpEF, h/o AVR x 3, CHB s/p CRT, AF on eliquis presents for ROBERT and hypotension. Found to be hypotensive to 94/58, saturating 97% on room air at rest. ED exam positive for melena and labs pertinent for Hb drop of 12-- to 7.         PAST MEDICAL & SURGICAL HISTORY  Atrial fibrillation    H/O: osteoarthritis    Hypertension    Dyslipidemia    History of BPH    H/O endocarditis    DM (diabetes mellitus)    S/P AVR (aortic valve replacement)  complicated by aortic aneurysm and infective endocarditis    H/O aortic aneurysm repair    S/P caroline    H/O knee surgery        FAMILY HISTORY:  FAMILY HISTORY:  No pertinent family history in first degree relatives      [ ] no pertinent family history of premature cardiovascular disease in first degree relatives.  Mother:   Father:   Siblings:     SOCIAL HISTORY:  []smoker  []Alcohol  []Drug    ALLERGIES:  No Known Allergies      MEDICATIONS:  MEDICATIONS  (STANDING):  pantoprazole Infusion 8 mG/Hr (10 mL/Hr) IV Continuous <Continuous>    MEDICATIONS  (PRN):      HOME MEDICATIONS:  Home Medications:  aspirin 81 mg oral tablet: 1 tab(s) orally once a day (16 Dec 2024 08:19)  Crestor 10 mg oral tablet: 1 tab(s) orally once a day (16 Dec 2024 08:19)  Eliquis 5 mg oral tablet: 1 tab(s) orally 2 times a day (16 Dec 2024 08:19)  Entresto 24 mg-26 mg oral tablet: 0.5 tab(s) orally 2 times a day (16 Dec 2024 08:49)  fenofibrate 134 mg oral capsule: 1 cap(s) orally once a day (16 Dec 2024 08:19)  metFORMIN 500 mg oral tablet: 1 tab(s) orally 2 times a day (16 Dec 2024 08:49)  metoprolol succinate 25 mg oral tablet, extended release: 1 tab(s) orally 2 times a day (16 Dec 2024 08:19)  torsemide 10 mg oral tablet: 1 tab(s) orally once a day (16 Dec 2024 08:49)  Uroxatral 10 mg oral tablet, extended release: 1 tab(s) orally once a day (16 Dec 2024 08:19)      VITALS:   T(F): 97.8 (12-30 @ 10:27), Max: 97.8 (12-30 @ 10:27)  HR: 96 (12-30 @ 10:27) (96 - 96)  BP: 103/54 (12-30 @ 13:15) (94/58 - 103/54)  BP(mean): --  RR: 18 (12-30 @ 10:27) (18 - 18)  SpO2: 97% (12-30 @ 10:27) (97% - 97%)    I&O's Summary      REVIEW OF SYSTEMS:    Negative except as mentioned in HPI    CONSTITUTIONAL: No weakness, fevers or chills  EYES: No visual changes  ENT: No vertigo or throat pain   NECK: No pain or stiffness  RESPIRATORY: No cough, wheezing, hemoptysis; No shortness of breath  CARDIOVASCULAR: No chest pain or palpitations  GASTROINTESTINAL: No abdominal or epigastric pain. No nausea, vomiting, or hematemesis; No diarrhea or constipation. No melena or hematochezia.  GENITOURINARY: No dysuria, frequency or hematuria  NEUROLOGICAL: No numbness or weakness  SKIN: No itching, no rashes  MSK: FROM x4    PHYSICAL EXAM:  NEURO: Awake , alert and oriented  GEN: Not in acute distress  NECK: No JVD  LUNGS: Clear to auscultation bilaterally   CARDIOVASCULAR: S1/S2 present, RRR , no murmurs or rubs, no carotid bruits,  + PP bilaterally  ABD: Soft, non-tender, non-distended, +BS  EXT: No MANDY  SKIN: Intact    LABS:                        7.9    7.43  )-----------( 187      ( 30 Dec 2024 11:45 )             25.0     12-30    138  |  104  |  43[H]  ----------------------------<  130[H]  4.8   |  25  |  1.8[H]    Ca    9.4      30 Dec 2024 11:45  Mg     2.3     12-30    TPro  6.4  /  Alb  4.1  /  TBili  0.5  /  DBili  x   /  AST  23  /  ALT  10  /  AlkPhos  35  12-30    PT/INR - ( 30 Dec 2024 13:50 )   PT: 13.70 sec;   INR: 1.16 ratio         PTT - ( 30 Dec 2024 13:50 )  PTT:32.5 sec          Troponin trend:    
Gastroenterology Consultation:    Patient is a 74y old  Male who presents with a chief complaint of       Admitted on: 12-30-24      HPI:  74-year-old male with past medical history of hypertension, hyperlipidemia, CHF, A-fib on Eliquis, pacemaker placement presents for evaluation after referral from Dr. Willis.  He states for the past 2 days his blood pressure has been too low to take his hypertensive and CHF medications causing increasing foot swelling, dyspnea on exertion, shortness of breath and hypoxia measured at 80% at home after walking upstairs. Gi consulted for reported melena and acute anemia. Patient denies any blood per rectum or dark colored stools. Hb was noted to be 7.9 from 12 2 weeks ago. XIMENA performed by GI team shows dark stools, brown on smear. Patient has reported constipation within the last month. Denies dysphagia, abdominal pain, nausea, vomiting diarrhea, rectal pain. Denies NSAID use. Denies weight loss, fam hx gi malignancy. States had colonoscopy several years ago, was unremarkable. Had EGD over 15 years ago which reportedly also unremarkable.       Prior EGD:  none in chart  Prior Colonoscopy:  none in chart    PAST MEDICAL & SURGICAL HISTORY:  Atrial fibrillation      H/O: osteoarthritis      Hypertension      Dyslipidemia      History of BPH      H/O endocarditis      DM (diabetes mellitus)      S/P AVR (aortic valve replacement)  complicated by aortic aneurysm and infective endocarditis      H/O aortic aneurysm repair      S/P caroline      H/O knee surgery            FAMILY HISTORY:  No pertinent family history in first degree relatives        Social History:  Tobacco:denies  Alcohol:denies  Drugs:denies    Home Medications:  aspirin 81 mg oral tablet: 1 tab(s) orally once a day (16 Dec 2024 08:19)  Crestor 10 mg oral tablet: 1 tab(s) orally once a day (16 Dec 2024 08:19)  Eliquis 5 mg oral tablet: 1 tab(s) orally 2 times a day (16 Dec 2024 08:19)  Entresto 24 mg-26 mg oral tablet: 0.5 tab(s) orally 2 times a day (16 Dec 2024 08:49)  fenofibrate 134 mg oral capsule: 1 cap(s) orally once a day (16 Dec 2024 08:19)  metFORMIN 500 mg oral tablet: 1 tab(s) orally 2 times a day (16 Dec 2024 08:49)  metoprolol succinate 25 mg oral tablet, extended release: 1 tab(s) orally 2 times a day (16 Dec 2024 08:19)  torsemide 10 mg oral tablet: 1 tab(s) orally once a day (16 Dec 2024 08:49)  Uroxatral 10 mg oral tablet, extended release: 1 tab(s) orally once a day (16 Dec 2024 08:19)        MEDICATIONS  (STANDING):  pantoprazole Infusion 8 mG/Hr (10 mL/Hr) IV Continuous <Continuous>    MEDICATIONS  (PRN):      Allergies  No Known Allergies      Review of Systems:   Constitutional:  No Fever, No Chills  ENT/Mouth:  No Hearing Changes,  No Difficulty Swallowing  Eyes:  No Eye Pain, No Vision Changes  Cardiovascular:  No Chest Pain, No Palpitations  Respiratory:  No Cough, No Dyspnea  Gastrointestinal:  As described in HPI  Musculoskeletal:  No Joint Swelling, No Back Pain  Skin:  No Skin Lesions, No Jaundice  Neuro:  No Syncope, No Dizziness  Heme/Lymph:  No Bruising, No Bleeding.          Physical Examination:  T(C): 36.6 (12-30-24 @ 10:27), Max: 36.6 (12-30-24 @ 10:27)  HR: 96 (12-30-24 @ 10:27) (96 - 96)  BP: 103/54 (12-30-24 @ 13:15) (94/58 - 103/54)  RR: 18 (12-30-24 @ 10:27) (18 - 18)  SpO2: 97% (12-30-24 @ 10:27) (97% - 97%)  Height (cm): 185.4 (12-30-24 @ 10:27)  Weight (kg): 93 (12-30-24 @ 10:27)        GENERAL: AAOx3, no acute distress.  HEAD:  Atraumatic, Normocephalic  EYES: conjunctiva and sclera clear  NECK: Supple, no JVD or thyromegaly  CHEST/LUNG: Clear to auscultation bilaterally; No wheeze, rhonchi, or rales  HEART: Regular rate and rhythm; normal S1, S2, No murmurs.  ABDOMEN: Soft, nontender, nondistended; Bowel sounds present  NEUROLOGY: No asterixis or tremor.   SKIN: Intact, no jaundice  XIMENA shows dark stool, brown on smear        Data:                        7.9    7.43  )-----------( 187      ( 30 Dec 2024 11:45 )             25.0     Hgb Trend:  7.9  12-30-24 @ 11:45      12-30    138  |  104  |  43[H]  ----------------------------<  130[H]  4.8   |  25  |  1.8[H]    Ca    9.4      30 Dec 2024 11:45  Mg     2.3     12-30    TPro  6.4  /  Alb  4.1  /  TBili  0.5  /  DBili  x   /  AST  23  /  ALT  10  /  AlkPhos  35  12-30    Liver panel trend:  TBili 0.5   /   AST 23   /   ALT 10   /   AlkP 35   /   Tptn 6.4   /   Alb 4.1    /   DBili --      12-30      PT/INR - ( 30 Dec 2024 13:50 )   PT: 13.70 sec;   INR: 1.16 ratio         PTT - ( 30 Dec 2024 13:50 )  PTT:32.5 sec        Radiology:      ACC: [id number] EXAM:  ER US CHEST   ORDERED BY: CLAIR RICCI     PROCEDURE DATE:  12/30/2024          INTERPRETATION:  Lungs evaluated for pulmonary edema  R1-R4 and L1-L4 evaluated.  B lines seen L >R  Mild pleural effusion to L side    Impression:  Pulmonary edema  Left sided small pleural effusion    --- End of Report ---            JOSE CASTILLO MD; Attending Emergency Medicine   This document has been electronically signed. Dec 30 2024 11:56AM    
Patient is a 74y old  Male who presents with a chief complaint of low BP and Edema    HPI:  Our office was not informed of the consult, i found out about the admission from patient's daughter in law, we did not receive consult.    Well known patient to me was admitted with severe acute anemia, hypotension and edema after holding his HF medication due to low BP, blood test revealed a drop of Hgb from 12 to 7 range in a short time while has been taking Eliquis and ASA chronically.   Patient was seen by GI and an EGD off Eliquis was suggested.    In the last few months he has been having SOB, ROBERT, fatigue, echo revealed a normal EF but AS/ AI/ MR and the same chronic LVH but a severe pulmonary HTN, he was referred to Dr Willis for the assessment and underwent right heart Cath that confirmed the pulmonary HTN and pre & post capillary high pressure.  General plan prior to this recent Hgb drop was a routine referral to structural team to see if there is an option to do TAVR on the bioprosthetic valve and continuing with HF management by Entresto, Metoprolol, Diuretics.    Known Hx of 2 times AVR (by my record), AA surgery, Aflutter, CHB, PPM, remote hx of aortic valve endocarditis, LVH    74y Male with PMH of HTN, AVR x 3 (1996, 2011, and 2012), chronic Afib, s/p CRT for CHB in 2018, stenosis of bioprosthetic aortic valve came to the ed for shortness of breath. Hx taken from the patient and is aox3 at time of the interview. Hx also provided by the daughter and wife at bedside. States that for the past few days, he has been worsening of his shortness of breath especially on exertion. Recently he has been so short of breath that he is now using pillows under his head during night, and cant lie flat and have to incline his bed up.  Family states that patient is also looking more pale than his baseline. He states for the past 2 days his blood pressure has been too low & 80/38 mmhg) to take his hypertensive and CHF medications. Review of the systems is negative for headache, dizziness, chest pain, palpitations, nausea, vomit, diarrhea, dysuria, frequency, urgency and other significant abdomen complaints.     ED VITALS   · BP Systolic	 94 mm Hg  · BP Diastolic	 58 mm Hg  · Heart Rate	96 /min  · Respiration Rate (breaths/min)	18 /min  · Temp (F)	97.8 Degrees F  · Temp (C)	36.6 Degrees C  · Temp site	oral  · SpO2 (%)	97 %  · O2 Delivery/Oxygen Delivery Method	room air  · Temp at ED Arrival (C)	36.6 Degrees C    Labs    hb 7.9, trops 52, 58, creat 1.8, bnp 2100, gfr 39    IMAGING    xray chest:   Bilateral opacities/left pleural effusion. Redemonstration cardiomegaly. (30 Dec 2024 18:01)      PAST MEDICAL & SURGICAL HISTORY:  Atrial fibrillation      H/O: osteoarthritis      Hypertension      Dyslipidemia      History of BPH      H/O endocarditis      DM (diabetes mellitus)      S/P AVR (aortic valve replacement)  complicated by aortic aneurysm and infective endocarditis      H/O aortic aneurysm repair      S/P caroline      H/O knee surgery          PREVIOUS DIAGNOSTIC TESTING:      ECHO  FINDINGS: in the office in oct 2024: EF 65%, moderate LVH, PA pressure 71 mmHg, peak and mean gradient across Ao valve 55/33 mmHg, AI, moderate MR and TR    STRESS TEST  FINDINGS: remote in 2018 was normal     CATHETERIZATION  FINDINGS: < from: Cardiac Catheterization (12.16.24 @ 10:26) >  - /57/81 mmHg    - RA 9 mmHg    - PA 70/30/47 mmHg    - PAWP 29/v57 mmHg   - CO/CI: 4.72/2.18 (Freeman) and 4.2/1.95 (TD)   - PVR 4.3 mmHg     < end of copied text >  < from: Cardiac Catheterization (12.16.24 @ 10:26) >  Phase          Location           [mmHg]                [mmHg]  [mmHg]            HR  Baseline      RV                  s      70  ed      8          82  Baseline   PA                  s      70                d       30  m       47    82  Baseline      PCW                 a      29                v       57  m       29    80  Baseline      RA                  a      11                v       10  m  9 82    Oxygen Saturations   Phase          Location        Hb             Sat            pO2  Content        Group  Baseline         PCW                      11              85  49           12.71   PV  Baseline         PA                       11          59  37            8.91   PA    Patient: RODRICK IQBAL         MRN: [id number]  Study Date: 12/16/2024   10:26 AM      Page 2 of 4      < end of copied text >      MEDICATIONS  (STANDING):  fenofibrate Tablet 48 milliGRAM(s) Oral at bedtime  latanoprost 0.005% Ophthalmic Solution 1 Drop(s) Both EYES at bedtime  pantoprazole  Injectable 40 milliGRAM(s) IV Push every 12 hours  polyethylene glycol 3350 17 Gram(s) Oral once  rosuvastatin 10 milliGRAM(s) Oral at bedtime    MEDICATIONS  (PRN):  acetaminophen     Tablet .. 650 milliGRAM(s) Oral every 6 hours PRN Temp greater or equal to 38C (100.4F), Mild Pain (1 - 3)  aluminum hydroxide/magnesium hydroxide/simethicone Suspension 30 milliLiter(s) Oral every 4 hours PRN Dyspepsia  melatonin 3 milliGRAM(s) Oral at bedtime PRN Insomnia  ondansetron Injectable 4 milliGRAM(s) IV Push every 8 hours PRN Nausea and/or Vomiting      FAMILY HISTORY:      SOCIAL HISTORY:  CIGARETTES: no  ALCOHOL: no  DRUGS: no                      REVIEW OF SYSTEMS:  no fever, chills, diaphoresis  No palpitation chest pain but SOB, ROBERT  leg edema  No faint or syncope  Brownish stool, no clear melena, no fresh rectal bleeding, no abdominal pain, no nausea vomiting, but constipation chronically      Vital Signs Last 24 Hrs  T(C): 36.7 (31 Dec 2024 12:30), Max: 36.7 (31 Dec 2024 04:47)  T(F): 98.1 (31 Dec 2024 12:30), Max: 98.1 (31 Dec 2024 12:30)  HR: 106 (31 Dec 2024 12:30) (64 - 106)  BP: 123/63 (31 Dec 2024 12:30) (91/49 - 123/63)  BP(mean): --  RR: 17 (31 Dec 2024 12:30) (17 - 18)  SpO2: 99% (31 Dec 2024 08:14) (95% - 99%)    Parameters below as of 31 Dec 2024 08:14  Patient On (Oxygen Delivery Method): room air                          PHYSICAL EXAM:  this is a remote note    ECG: < from: 12 Lead ECG (08.28.24 @ 13:33) >   AV dual-paced rhythm  Abnormal ECG    < end of copied text >      I&O's Detail    30 Dec 2024 07:01  -  31 Dec 2024 07:00  --------------------------------------------------------  IN:    Oral Fluid: 100 mL  Total IN: 100 mL    OUT:    Voided (mL): 900 mL  Total OUT: 900 mL    Total NET: -800 mL      31 Dec 2024 07:01  -  31 Dec 2024 13:47  --------------------------------------------------------  IN:    Oral Fluid: 575 mL  Total IN: 575 mL    OUT:    Voided (mL): 300 mL  Total OUT: 300 mL    Total NET: 275 mL          LABS:                        8.6    7.59  )-----------( 199      ( 31 Dec 2024 06:44 )             26.6     12-31    140  |  103  |  34[H]  ----------------------------<  106[H]  4.5   |  20  |  1.7[H]    Ca    9.0      31 Dec 2024 06:44  Mg     2.1     12-31    TPro  6.2  /  Alb  4.1  /  TBili  1.5[H]  /  DBili  x   /  AST  25  /  ALT  10  /  AlkPhos  39  12-31        PT/INR - ( 30 Dec 2024 13:50 )   PT: 13.70 sec;   INR: 1.16 ratio         PTT - ( 30 Dec 2024 13:50 )  PTT:32.5 sec  Urinalysis Basic - ( 31 Dec 2024 06:44 )    Color: x / Appearance: x / SG: x / pH: x  Gluc: 106 mg/dL / Ketone: x  / Bili: x / Urobili: x   Blood: x / Protein: x / Nitrite: x   Leuk Esterase: x / RBC: x / WBC x   Sq Epi: x / Non Sq Epi: x / Bacteria: x      I&O's Summary    30 Dec 2024 07:01  -  31 Dec 2024 07:00  --------------------------------------------------------  IN: 100 mL / OUT: 900 mL / NET: -800 mL    31 Dec 2024 07:01  -  31 Dec 2024 13:47  --------------------------------------------------------  IN: 575 mL / OUT: 300 mL / NET: 275 mL        RADIOLOGY & ADDITIONAL STUDIES:< from: Xray Kidney Ureter Bladder (12.31.24 @ 11:54) >  There is a nonobstructive bowel gas pattern.  There is partially imaged median sternotomy and pacemaker lead. There are   right upper quadrant abdominal surgical clips. There are also surgical   clips overlying the right hip . No acute osseous abnormality      Impression:    Nonobstructive bowel gas pattern.      < end of copied text >  < from: Xray Chest 1 View- PORTABLE-Urgent (12.30.24 @ 11:23) >  Support devices: Stable position right ICD..    Cardiac/mediastinum/hilum: Cardiomegaly, status post median sternotomy,   aortic valve replacement..    Lung parenchyma/Pleura: Bilateral opacities/left pleural effusion    Skeleton/soft tissues: Stable.      IMPRESSION:    Bilateral opacities/left pleural effusion. Redemonstration cardiomegaly.      < end of copied text >  < from: CT Chest No Cont (11.11.24 @ 17:02) >  Interval development bilateral small pleural effusions with stable   bilateral lowerlobe parenchymal scarring.  No bronchiectasis or honeycombing.  Post repair ascending thoracic aorta with stable postoperative appearance    < end of copied text >  < from: CT Angio Chest Aorta w/wo IV Cont (06.09.24 @ 09:08) >  Since October 13, 2020;    Stable postoperative appearance of thoracic aorta, unchanged in size and   configuration.      < end of copied text >  < from: CT Angio Chest Aorta w/wo IV Cont (06.09.24 @ 09:08) >  HEART/GREAT VESSELS: Prior ascending thoracic aortic repair to the level   of the mid aortic arch. Stable postoperative appearance of the thoracic   aorta without aneurysm or dissection. Patent left main coronary artery   bypass. Post aortic valve replacement. No pericardial effusion. Main   pulmonary arteries unchanged.    < end of copied text >

## 2025-01-01 LAB
ALBUMIN SERPL ELPH-MCNC: 4.2 G/DL — SIGNIFICANT CHANGE UP (ref 3.5–5.2)
ALBUMIN SERPL ELPH-MCNC: 4.7 G/DL — SIGNIFICANT CHANGE UP (ref 3.5–5.2)
ALP SERPL-CCNC: 43 U/L — SIGNIFICANT CHANGE UP (ref 30–115)
ALP SERPL-CCNC: 46 U/L — SIGNIFICANT CHANGE UP (ref 30–115)
ALT FLD-CCNC: 12 U/L — SIGNIFICANT CHANGE UP (ref 0–41)
ALT FLD-CCNC: 13 U/L — SIGNIFICANT CHANGE UP (ref 0–41)
ANION GAP SERPL CALC-SCNC: 12 MMOL/L — SIGNIFICANT CHANGE UP (ref 7–14)
ANION GAP SERPL CALC-SCNC: 9 MMOL/L — SIGNIFICANT CHANGE UP (ref 7–14)
APTT BLD: 30.6 SEC — SIGNIFICANT CHANGE UP (ref 27–39.2)
AST SERPL-CCNC: 24 U/L — SIGNIFICANT CHANGE UP (ref 0–41)
AST SERPL-CCNC: 27 U/L — SIGNIFICANT CHANGE UP (ref 0–41)
BASOPHILS # BLD AUTO: 0.05 K/UL — SIGNIFICANT CHANGE UP (ref 0–0.2)
BASOPHILS NFR BLD AUTO: 0.5 % — SIGNIFICANT CHANGE UP (ref 0–1)
BILIRUB SERPL-MCNC: 0.8 MG/DL — SIGNIFICANT CHANGE UP (ref 0.2–1.2)
BILIRUB SERPL-MCNC: 1 MG/DL — SIGNIFICANT CHANGE UP (ref 0.2–1.2)
BLD GP AB SCN SERPL QL: SIGNIFICANT CHANGE UP
BUN SERPL-MCNC: 23 MG/DL — HIGH (ref 10–20)
BUN SERPL-MCNC: 27 MG/DL — HIGH (ref 10–20)
CALCIUM SERPL-MCNC: 8.4 MG/DL — SIGNIFICANT CHANGE UP (ref 8.4–10.5)
CALCIUM SERPL-MCNC: 8.5 MG/DL — SIGNIFICANT CHANGE UP (ref 8.4–10.5)
CHLORIDE SERPL-SCNC: 101 MMOL/L — SIGNIFICANT CHANGE UP (ref 98–110)
CHLORIDE SERPL-SCNC: 102 MMOL/L — SIGNIFICANT CHANGE UP (ref 98–110)
CO2 SERPL-SCNC: 24 MMOL/L — SIGNIFICANT CHANGE UP (ref 17–32)
CO2 SERPL-SCNC: 26 MMOL/L — SIGNIFICANT CHANGE UP (ref 17–32)
CREAT SERPL-MCNC: 1.3 MG/DL — SIGNIFICANT CHANGE UP (ref 0.7–1.5)
CREAT SERPL-MCNC: 1.5 MG/DL — SIGNIFICANT CHANGE UP (ref 0.7–1.5)
EGFR: 48 ML/MIN/1.73M2 — LOW
EGFR: 57 ML/MIN/1.73M2 — LOW
EOSINOPHIL # BLD AUTO: 0.42 K/UL — SIGNIFICANT CHANGE UP (ref 0–0.7)
EOSINOPHIL NFR BLD AUTO: 4.6 % — SIGNIFICANT CHANGE UP (ref 0–8)
GLUCOSE BLDC GLUCOMTR-MCNC: 178 MG/DL — HIGH (ref 70–99)
GLUCOSE SERPL-MCNC: 137 MG/DL — HIGH (ref 70–99)
GLUCOSE SERPL-MCNC: 155 MG/DL — HIGH (ref 70–99)
HCT VFR BLD CALC: 26.6 % — LOW (ref 42–52)
HCT VFR BLD CALC: 28.9 % — LOW (ref 42–52)
HGB BLD-MCNC: 8.5 G/DL — LOW (ref 14–18)
HGB BLD-MCNC: 9.4 G/DL — LOW (ref 14–18)
IMM GRANULOCYTES NFR BLD AUTO: 0.4 % — HIGH (ref 0.1–0.3)
INR BLD: 1.14 RATIO — SIGNIFICANT CHANGE UP (ref 0.65–1.3)
IRON SATN MFR SERPL: 11 % — LOW (ref 15–50)
IRON SATN MFR SERPL: 38 UG/DL — SIGNIFICANT CHANGE UP (ref 35–150)
LYMPHOCYTES # BLD AUTO: 1.07 K/UL — LOW (ref 1.2–3.4)
LYMPHOCYTES # BLD AUTO: 11.7 % — LOW (ref 20.5–51.1)
MAGNESIUM SERPL-MCNC: 2.2 MG/DL — SIGNIFICANT CHANGE UP (ref 1.8–2.4)
MCHC RBC-ENTMCNC: 29.7 PG — SIGNIFICANT CHANGE UP (ref 27–31)
MCHC RBC-ENTMCNC: 30.1 PG — SIGNIFICANT CHANGE UP (ref 27–31)
MCHC RBC-ENTMCNC: 32 G/DL — SIGNIFICANT CHANGE UP (ref 32–37)
MCHC RBC-ENTMCNC: 32.5 G/DL — SIGNIFICANT CHANGE UP (ref 32–37)
MCV RBC AUTO: 92.6 FL — SIGNIFICANT CHANGE UP (ref 80–94)
MCV RBC AUTO: 93 FL — SIGNIFICANT CHANGE UP (ref 80–94)
MONOCYTES # BLD AUTO: 0.99 K/UL — HIGH (ref 0.1–0.6)
MONOCYTES NFR BLD AUTO: 10.9 % — HIGH (ref 1.7–9.3)
NEUTROPHILS # BLD AUTO: 6.54 K/UL — HIGH (ref 1.4–6.5)
NEUTROPHILS NFR BLD AUTO: 71.9 % — SIGNIFICANT CHANGE UP (ref 42.2–75.2)
NRBC # BLD: 0 /100 WBCS — SIGNIFICANT CHANGE UP (ref 0–0)
NRBC # BLD: 0 /100 WBCS — SIGNIFICANT CHANGE UP (ref 0–0)
PLATELET # BLD AUTO: 202 K/UL — SIGNIFICANT CHANGE UP (ref 130–400)
PLATELET # BLD AUTO: 223 K/UL — SIGNIFICANT CHANGE UP (ref 130–400)
PMV BLD: 11.6 FL — HIGH (ref 7.4–10.4)
PMV BLD: 11.9 FL — HIGH (ref 7.4–10.4)
POTASSIUM SERPL-MCNC: 3.7 MMOL/L — SIGNIFICANT CHANGE UP (ref 3.5–5)
POTASSIUM SERPL-MCNC: 3.9 MMOL/L — SIGNIFICANT CHANGE UP (ref 3.5–5)
POTASSIUM SERPL-SCNC: 3.7 MMOL/L — SIGNIFICANT CHANGE UP (ref 3.5–5)
POTASSIUM SERPL-SCNC: 3.9 MMOL/L — SIGNIFICANT CHANGE UP (ref 3.5–5)
PROT SERPL-MCNC: 6 G/DL — SIGNIFICANT CHANGE UP (ref 6–8)
PROT SERPL-MCNC: 6.7 G/DL — SIGNIFICANT CHANGE UP (ref 6–8)
PROTHROM AB SERPL-ACNC: 13.5 SEC — HIGH (ref 9.95–12.87)
RBC # BLD: 2.86 M/UL — LOW (ref 4.7–6.1)
RBC # BLD: 3.12 M/UL — LOW (ref 4.7–6.1)
RBC # FLD: 15.5 % — HIGH (ref 11.5–14.5)
RBC # FLD: 15.9 % — HIGH (ref 11.5–14.5)
SODIUM SERPL-SCNC: 136 MMOL/L — SIGNIFICANT CHANGE UP (ref 135–146)
SODIUM SERPL-SCNC: 138 MMOL/L — SIGNIFICANT CHANGE UP (ref 135–146)
TIBC SERPL-MCNC: 335 UG/DL — SIGNIFICANT CHANGE UP (ref 220–430)
UIBC SERPL-MCNC: 297 UG/DL — SIGNIFICANT CHANGE UP (ref 110–370)
WBC # BLD: 8.81 K/UL — SIGNIFICANT CHANGE UP (ref 4.8–10.8)
WBC # BLD: 9.11 K/UL — SIGNIFICANT CHANGE UP (ref 4.8–10.8)
WBC # FLD AUTO: 8.81 K/UL — SIGNIFICANT CHANGE UP (ref 4.8–10.8)
WBC # FLD AUTO: 9.11 K/UL — SIGNIFICANT CHANGE UP (ref 4.8–10.8)

## 2025-01-01 PROCEDURE — 99233 SBSQ HOSP IP/OBS HIGH 50: CPT

## 2025-01-01 RX ADMIN — BUMETANIDE 1 MILLIGRAM(S): 2 TABLET ORAL at 05:28

## 2025-01-01 RX ADMIN — PANTOPRAZOLE 40 MILLIGRAM(S): 40 TABLET, DELAYED RELEASE ORAL at 18:43

## 2025-01-01 RX ADMIN — Medication 17 GRAM(S): at 05:28

## 2025-01-01 RX ADMIN — LATANOPROST 1 DROP(S): 50 SOLUTION OPHTHALMIC at 21:57

## 2025-01-01 RX ADMIN — ROSUVASTATIN 10 MILLIGRAM(S): 40 TABLET, FILM COATED ORAL at 21:56

## 2025-01-01 RX ADMIN — Medication 17 GRAM(S): at 18:43

## 2025-01-01 RX ADMIN — SENNOSIDES 2 TABLET(S): 8.6 TABLET, FILM COATED ORAL at 21:56

## 2025-01-01 RX ADMIN — FENOFIBRATE 48 MILLIGRAM(S): 48 TABLET ORAL at 22:00

## 2025-01-01 RX ADMIN — PANTOPRAZOLE 40 MILLIGRAM(S): 40 TABLET, DELAYED RELEASE ORAL at 05:28

## 2025-01-01 NOTE — DIETITIAN INITIAL EVALUATION ADULT - NS FNS DIET ORDER
Diet, NPO after Midnight:      NPO Start Date: 01-Jan-2025,   NPO Start Time: 23:59  Except Medications (01-01-25 @ 09:30)

## 2025-01-01 NOTE — DIETITIAN INITIAL EVALUATION ADULT - NAME AND PHONE
Nirmala Jeff x [phone] or [employer] TEAMS     High risk follow up      Monitoring/Evaluating: Labs, Lytes, Wts, PO intake, Diet and texture tolerance, NFPF, GI S/S, BM.

## 2025-01-01 NOTE — PROGRESS NOTE ADULT - ASSESSMENT
74-year-old male with past medical history of hypertension, hyperlipidemia, CHF, A-fib on Eliquis, pacemaker placement presents for evaluation after referral from Dr. Willis.  He states for the past 2 days his blood pressure has been too low to take his hypertensive and CHF medications causing increasing foot swelling, dyspnea on exertion, shortness of breath and hypoxia measured at 80% at home after walking upstairs. Gi consulted for reported melena and acute anemia. Patient denies any blood per rectum or dark colored stools. Hb was noted to be 7.9 from 12 2 weeks ago. XIMENA performed by GI team shows dark stools, brown on smear. Patient has reported constipation within the last month. Denies dysphagia, abdominal pain, nausea, vomiting diarrhea, rectal pain. Denies NSAID use. Denies weight loss, fam hx gi malignancy. States had colonoscopy several years ago, was unremarkable. Had EGD over 15 years ago which reportedly also unremarkable.       #Acute anemia / dark colored stools - rule out upper GI bleed  #constipation  - Sent by cardiologist, recent RHC, shortness of breath  - Denies overt bleeding  - Hemodynamically stable & no active bleeding  - XIMENA shows dark stool, brown on smear  - Baseline hemoglobin - 12  - Hemoglobin on admission - 7.9  - Coags - 1.16  - Last use of eliquis 2 days ago  - POCUS in ED showing Pulmonary edema Left sided small pleural effusion    #Rec  - cw IV PPI BID   - NPO after midnight  - Will plan for EGD on Thursday 1/2 when patient is medically optimized for procedure  - Please obtain preop labs (coags, CBC, CMP, mag) night before procedure   - Cardio eval appreciated, intermediate to high risk. No contraindications to proceed at this time  - Maintain active Type and screen  - Trend H&H BID  - Please place x2 18G IVs  - Please target Hb  >8  - Please avoid any NSAIDs  - If any unstable bleed, please call GI stat

## 2025-01-01 NOTE — CHART NOTE - NSCHARTNOTEFT_GEN_A_CORE
Dietitian consult for decompensated heart failure, salt & fluid restriction. Reviewed CHF nutrition therapy concepts. Reviewed salt & fluid restriction. Discussed current diet order. Written materials of aforementioned education from Nutrition Care Manual.

## 2025-01-01 NOTE — DIETITIAN NUTRITION RISK NOTIFICATION - TREATMENT: THE FOLLOWING DIET HAS BEEN RECOMMENDED
Diet, DASH/TLC:   Sodium & Cholesterol Restricted  Supplement Feeding Modality:  Oral  Ensure Pudding Cans or Servings Per Day:  1       Frequency:  Two Times a day (01-01-25 @ 13:14) [Pending Verification By Attending]

## 2025-01-01 NOTE — DIETITIAN INITIAL EVALUATION ADULT - OTHER CALCULATIONS
Energy: MSJ x 1.0-1.2 = 1599-3070 kcal/day --in setting of CHF, BMI, AGE  Protein: 92.4 kg x 1.1-1.3=  102-120 grams / protein  Fluids: 1 kcal/ml or per MD recommendation  Estimated energy and protein needs with consideration for weight maintenance/gain, BMI, age, mobility, nutritional risk, and comorbidities.

## 2025-01-01 NOTE — DIETITIAN INITIAL EVALUATION ADULT - EDUCATION DIETARY MODIFICATIONS
Encouraged PO intake, Discussed alternatives to supplement diet with calories/protein. Pt in agreement to try Magic Cup/(1) partially meets; needs review/practice/verbalization

## 2025-01-01 NOTE — DIETITIAN INITIAL EVALUATION ADULT - ORAL INTAKE PTA/DIET HISTORY
RD spoke with pt and family at bedside. Pt aox4. Pt with poor PO ~ 1 month d/t lack of appetite, early satiety, and persistent constipation. KNFA. Pt  lbs ~ 3 monthd ago.  lbs.     Subjective: 6.4% unintentional wt loss --pt does not meet weight criteria for malnutrition. Pt currently eating <25-50% of meals with varied intake. Did well with dinner, but no appetite at breakfast. had coffee and half muffin. Reports food looked good, but just didn't have appetite.

## 2025-01-01 NOTE — DIETITIAN INITIAL EVALUATION ADULT - PERTINENT MEDS FT
MEDICATIONS  (STANDING):  buMETAnide Injectable 1 milliGRAM(s) IV Push daily  fenofibrate Tablet 48 milliGRAM(s) Oral at bedtime  latanoprost 0.005% Ophthalmic Solution 1 Drop(s) Both EYES at bedtime  pantoprazole  Injectable 40 milliGRAM(s) IV Push every 12 hours  polyethylene glycol 3350 17 Gram(s) Oral two times a day  rosuvastatin 10 milliGRAM(s) Oral at bedtime  senna 2 Tablet(s) Oral at bedtime

## 2025-01-01 NOTE — DIETITIAN INITIAL EVALUATION ADULT - COLLABORATION WITH OTHER PROVIDERS
Maribel called to report that she has been out of her Sirolimus for two days as her shipment did not arrive. She was promised a delivery today, but it was not received. Maribel is willing to go  the medication tonight.    [place] [facility] and hospital discharge pharmacy closed.  Spoke with [facility] pharmacy in [facility] and they do carry it and have enough. Patient prefers to have only a 3 day supply sent for her to . Patient to call back with any further questions or concerns.    resident, rn

## 2025-01-01 NOTE — DIETITIAN INITIAL EVALUATION ADULT - ADD RECOMMEND
1. Add Magic Cup to Diet order to provide 290 kcal and 9 grams protein in per serving. 2 x/day  2. Continue current diet order as tolerated.   3. Daily MVI to meet 100% DRIs   4. Monitor GI s/s. Continue with BM regimen.

## 2025-01-01 NOTE — PROGRESS NOTE ADULT - ASSESSMENT
73 y/o man with PMH of HTN, AVR x 3 (1996, 2011, and 2012), chronic Afib on Eliquis, s/p CRT for CHB in 2018 and stenosis of bioprosthetic aortic valve came to the ED for shortness of breath and hypotension.    1. 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   - TTE-12/14/2024: Reported history of AVR x 3. Peak transaortic velocity is 4.23 m/s, peak/mean transaortic gradient is 72/46 mmHg with an LVOT/aortic valve VTI ratio of 0.25. EOA 0.96 , moderate eccentric AI, mod to severe MR, moderate pulm HTN   - IV bumex was given after 1 unit of pRBCs on 12/30  - continuing IV bumex 1mg daily with hold parameters for hypotension    - holding Entresto and metoprolol  - hypotension now resolved   - Trops stable at 58  - Daily weights, I's and O's, fluid restriction 1.5 L  - evaluated by heart failure  - Pending structural cardiology eval after anemia workup  - Cardio consult appreciated - mod-high risk for minor risk procedure    2. Acute on chronic anemia likely due to GI bleed   - XIMENA in ED positive   - Hemoglobin: 7.9 g/dL (12.30.24 @ 11:45) Hemoglobin: 12.2 g/dL (12.16.24 @ 08:06)   - Last use of Eliquis 2 days prior to admission  - monitor H/H  - keep active T&S  - Hgb now improved at 9.3  - GI consult and f/u appreciated - plan for EGD 1/2 - NPO after midnight  - continue protonix IV bid  - labs at 8PM today    3. Chronic afib   telemetry reviewed by me: paced  Holding Metoprolol and Eliquis    4. Constipation  KUB noted 12/31  started senna and miralax and monitor BMs    5. DVT PPX: can use SCD (ordered)  Eliquis on hold for now    CODE STATUS: full    very guarded prognosis  high risk pt      PROGRESS NOTE HANDOFF    Pending: labs at 8PM, EGD 1/2, monitor BP, continue diuresis    Family discussion: with pt and wife at bedside    Disposition: home

## 2025-01-01 NOTE — DIETITIAN INITIAL EVALUATION ADULT - PERTINENT LABORATORY DATA
01-01    138  |  102  |  27[H]  ----------------------------<  155[H]  3.9   |  24  |  1.5    Ca    8.5      01 Jan 2025 08:41  Mg     2.2     01-01    TPro  6.7  /  Alb  4.7  /  TBili  1.0  /  DBili  x   /  AST  27  /  ALT  13  /  AlkPhos  46  01-01  POCT Blood Glucose.: 178 mg/dL (01-01-25 @ 11:30)

## 2025-01-01 NOTE — DIETITIAN INITIAL EVALUATION ADULT - OTHER INFO
74y Male with PMH of HTN, AVR x 3 (1996, 2011, and 2012), chronic Afib on eliquis, s/p CRT for CHB in 2018, stenosis of bioprosthetic aortic valve came to the ed for shortness of breath, and hypotension.    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

## 2025-01-02 ENCOUNTER — TRANSCRIPTION ENCOUNTER (OUTPATIENT)
Age: 75
End: 2025-01-02

## 2025-01-02 ENCOUNTER — RESULT REVIEW (OUTPATIENT)
Age: 75
End: 2025-01-02

## 2025-01-02 ENCOUNTER — APPOINTMENT (OUTPATIENT)
Dept: PULMONOLOGY | Facility: HOSPITAL | Age: 75
End: 2025-01-02

## 2025-01-02 LAB
ALBUMIN SERPL ELPH-MCNC: 4.4 G/DL — SIGNIFICANT CHANGE UP (ref 3.5–5.2)
ALP SERPL-CCNC: 52 U/L — SIGNIFICANT CHANGE UP (ref 30–115)
ALT FLD-CCNC: 13 U/L — SIGNIFICANT CHANGE UP (ref 0–41)
ANION GAP SERPL CALC-SCNC: 12 MMOL/L — SIGNIFICANT CHANGE UP (ref 7–14)
APTT BLD: 30.3 SEC — SIGNIFICANT CHANGE UP (ref 27–39.2)
AST SERPL-CCNC: 26 U/L — SIGNIFICANT CHANGE UP (ref 0–41)
BASOPHILS # BLD AUTO: 0.03 K/UL — SIGNIFICANT CHANGE UP (ref 0–0.2)
BASOPHILS # BLD AUTO: 0.05 K/UL — SIGNIFICANT CHANGE UP (ref 0–0.2)
BASOPHILS NFR BLD AUTO: 0.4 % — SIGNIFICANT CHANGE UP (ref 0–1)
BASOPHILS NFR BLD AUTO: 0.7 % — SIGNIFICANT CHANGE UP (ref 0–1)
BILIRUB SERPL-MCNC: 1.1 MG/DL — SIGNIFICANT CHANGE UP (ref 0.2–1.2)
BUN SERPL-MCNC: 22 MG/DL — HIGH (ref 10–20)
CALCIUM SERPL-MCNC: 8.8 MG/DL — SIGNIFICANT CHANGE UP (ref 8.4–10.5)
CHLORIDE SERPL-SCNC: 102 MMOL/L — SIGNIFICANT CHANGE UP (ref 98–110)
CO2 SERPL-SCNC: 26 MMOL/L — SIGNIFICANT CHANGE UP (ref 17–32)
CREAT SERPL-MCNC: 1.5 MG/DL — SIGNIFICANT CHANGE UP (ref 0.7–1.5)
EGFR: 48 ML/MIN/1.73M2 — LOW
EOSINOPHIL # BLD AUTO: 0.27 K/UL — SIGNIFICANT CHANGE UP (ref 0–0.7)
EOSINOPHIL # BLD AUTO: 0.35 K/UL — SIGNIFICANT CHANGE UP (ref 0–0.7)
EOSINOPHIL NFR BLD AUTO: 3.6 % — SIGNIFICANT CHANGE UP (ref 0–8)
EOSINOPHIL NFR BLD AUTO: 5 % — SIGNIFICANT CHANGE UP (ref 0–8)
GLUCOSE SERPL-MCNC: 128 MG/DL — HIGH (ref 70–99)
HCT VFR BLD CALC: 27.9 % — LOW (ref 42–52)
HCT VFR BLD CALC: 28.7 % — LOW (ref 42–52)
HCT VFR BLD CALC: 29.4 % — LOW (ref 42–52)
HGB BLD-MCNC: 9.1 G/DL — LOW (ref 14–18)
HGB BLD-MCNC: 9.2 G/DL — LOW (ref 14–18)
HGB BLD-MCNC: 9.5 G/DL — LOW (ref 14–18)
IMM GRANULOCYTES NFR BLD AUTO: 0.4 % — HIGH (ref 0.1–0.3)
IMM GRANULOCYTES NFR BLD AUTO: 0.5 % — HIGH (ref 0.1–0.3)
INR BLD: 1.11 RATIO — SIGNIFICANT CHANGE UP (ref 0.65–1.3)
LYMPHOCYTES # BLD AUTO: 0.9 K/UL — LOW (ref 1.2–3.4)
LYMPHOCYTES # BLD AUTO: 0.99 K/UL — LOW (ref 1.2–3.4)
LYMPHOCYTES # BLD AUTO: 11.9 % — LOW (ref 20.5–51.1)
LYMPHOCYTES # BLD AUTO: 14.2 % — LOW (ref 20.5–51.1)
MCHC RBC-ENTMCNC: 30.1 PG — SIGNIFICANT CHANGE UP (ref 27–31)
MCHC RBC-ENTMCNC: 30.3 PG — SIGNIFICANT CHANGE UP (ref 27–31)
MCHC RBC-ENTMCNC: 30.4 PG — SIGNIFICANT CHANGE UP (ref 27–31)
MCHC RBC-ENTMCNC: 32.1 G/DL — SIGNIFICANT CHANGE UP (ref 32–37)
MCHC RBC-ENTMCNC: 32.3 G/DL — SIGNIFICANT CHANGE UP (ref 32–37)
MCHC RBC-ENTMCNC: 32.6 G/DL — SIGNIFICANT CHANGE UP (ref 32–37)
MCV RBC AUTO: 93 FL — SIGNIFICANT CHANGE UP (ref 80–94)
MCV RBC AUTO: 93.8 FL — SIGNIFICANT CHANGE UP (ref 80–94)
MCV RBC AUTO: 93.9 FL — SIGNIFICANT CHANGE UP (ref 80–94)
MONOCYTES # BLD AUTO: 0.89 K/UL — HIGH (ref 0.1–0.6)
MONOCYTES # BLD AUTO: 0.9 K/UL — HIGH (ref 0.1–0.6)
MONOCYTES NFR BLD AUTO: 11.9 % — HIGH (ref 1.7–9.3)
MONOCYTES NFR BLD AUTO: 12.8 % — HIGH (ref 1.7–9.3)
NEUTROPHILS # BLD AUTO: 4.64 K/UL — SIGNIFICANT CHANGE UP (ref 1.4–6.5)
NEUTROPHILS # BLD AUTO: 5.41 K/UL — SIGNIFICANT CHANGE UP (ref 1.4–6.5)
NEUTROPHILS NFR BLD AUTO: 66.9 % — SIGNIFICANT CHANGE UP (ref 42.2–75.2)
NEUTROPHILS NFR BLD AUTO: 71.7 % — SIGNIFICANT CHANGE UP (ref 42.2–75.2)
NRBC # BLD: 0 /100 WBCS — SIGNIFICANT CHANGE UP (ref 0–0)
PLATELET # BLD AUTO: 211 K/UL — SIGNIFICANT CHANGE UP (ref 130–400)
PLATELET # BLD AUTO: 228 K/UL — SIGNIFICANT CHANGE UP (ref 130–400)
PLATELET # BLD AUTO: 228 K/UL — SIGNIFICANT CHANGE UP (ref 130–400)
PMV BLD: 11.3 FL — HIGH (ref 7.4–10.4)
PMV BLD: 11.4 FL — HIGH (ref 7.4–10.4)
PMV BLD: 11.9 FL — HIGH (ref 7.4–10.4)
POTASSIUM SERPL-MCNC: 4.1 MMOL/L — SIGNIFICANT CHANGE UP (ref 3.5–5)
POTASSIUM SERPL-SCNC: 4.1 MMOL/L — SIGNIFICANT CHANGE UP (ref 3.5–5)
PROT SERPL-MCNC: 6.9 G/DL — SIGNIFICANT CHANGE UP (ref 6–8)
PROTHROM AB SERPL-ACNC: 13.1 SEC — HIGH (ref 9.95–12.87)
RBC # BLD: 3 M/UL — LOW (ref 4.7–6.1)
RBC # BLD: 3.06 M/UL — LOW (ref 4.7–6.1)
RBC # BLD: 3.13 M/UL — LOW (ref 4.7–6.1)
RBC # FLD: 15.4 % — HIGH (ref 11.5–14.5)
SODIUM SERPL-SCNC: 140 MMOL/L — SIGNIFICANT CHANGE UP (ref 135–146)
WBC # BLD: 6.95 K/UL — SIGNIFICANT CHANGE UP (ref 4.8–10.8)
WBC # BLD: 7.55 K/UL — SIGNIFICANT CHANGE UP (ref 4.8–10.8)
WBC # BLD: 9.63 K/UL — SIGNIFICANT CHANGE UP (ref 4.8–10.8)
WBC # FLD AUTO: 6.95 K/UL — SIGNIFICANT CHANGE UP (ref 4.8–10.8)
WBC # FLD AUTO: 7.55 K/UL — SIGNIFICANT CHANGE UP (ref 4.8–10.8)
WBC # FLD AUTO: 9.63 K/UL — SIGNIFICANT CHANGE UP (ref 4.8–10.8)

## 2025-01-02 PROCEDURE — 88305 TISSUE EXAM BY PATHOLOGIST: CPT | Mod: 26

## 2025-01-02 PROCEDURE — 43239 EGD BIOPSY SINGLE/MULTIPLE: CPT

## 2025-01-02 PROCEDURE — 99233 SBSQ HOSP IP/OBS HIGH 50: CPT

## 2025-01-02 PROCEDURE — 74176 CT ABD & PELVIS W/O CONTRAST: CPT | Mod: 26

## 2025-01-02 PROCEDURE — 88312 SPECIAL STAINS GROUP 1: CPT | Mod: 26

## 2025-01-02 RX ORDER — BISACODYL 5 MG
20 TABLET, DELAYED RELEASE (ENTERIC COATED) ORAL ONCE
Refills: 0 | Status: COMPLETED | OUTPATIENT
Start: 2025-01-02 | End: 2025-01-02

## 2025-01-02 RX ORDER — SACUBITRIL AND VALSARTAN 24; 26 MG/1; MG/1
1 TABLET, FILM COATED ORAL
Refills: 0 | Status: DISCONTINUED | OUTPATIENT
Start: 2025-01-02 | End: 2025-01-03

## 2025-01-02 RX ORDER — POLYETHYLENE GLYCOL 3350, SODIUM SULFATE ANHYDROUS, SODIUM BICARBONATE, SODIUM CHLORIDE, POTASSIUM CHLORIDE 236; 22.74; 6.74; 5.86; 2.97 G/4L; G/4L; G/4L; G/4L; G/4L
4000 POWDER, FOR SOLUTION ORAL ONCE
Refills: 0 | Status: COMPLETED | OUTPATIENT
Start: 2025-01-02 | End: 2025-01-02

## 2025-01-02 RX ADMIN — LATANOPROST 1 DROP(S): 50 SOLUTION OPHTHALMIC at 21:15

## 2025-01-02 RX ADMIN — PANTOPRAZOLE 40 MILLIGRAM(S): 40 TABLET, DELAYED RELEASE ORAL at 05:43

## 2025-01-02 RX ADMIN — POLYETHYLENE GLYCOL 3350, SODIUM SULFATE ANHYDROUS, SODIUM BICARBONATE, SODIUM CHLORIDE, POTASSIUM CHLORIDE 4000 MILLILITER(S): 236; 22.74; 6.74; 5.86; 2.97 POWDER, FOR SOLUTION ORAL at 18:20

## 2025-01-02 RX ADMIN — BUMETANIDE 1 MILLIGRAM(S): 2 TABLET ORAL at 05:43

## 2025-01-02 RX ADMIN — ACETAMINOPHEN 650 MILLIGRAM(S): 80 SOLUTION/ DROPS ORAL at 09:10

## 2025-01-02 RX ADMIN — FENOFIBRATE 48 MILLIGRAM(S): 48 TABLET ORAL at 21:16

## 2025-01-02 RX ADMIN — ROSUVASTATIN 10 MILLIGRAM(S): 40 TABLET, FILM COATED ORAL at 21:16

## 2025-01-02 RX ADMIN — PANTOPRAZOLE 40 MILLIGRAM(S): 40 TABLET, DELAYED RELEASE ORAL at 18:20

## 2025-01-02 RX ADMIN — SENNOSIDES 2 TABLET(S): 8.6 TABLET, FILM COATED ORAL at 21:16

## 2025-01-02 RX ADMIN — Medication 20 MILLIGRAM(S): at 21:36

## 2025-01-02 NOTE — CHART NOTE - NSCHARTNOTEFT_GEN_A_CORE
No evidence of active or recent GI bleed  NOrmal esophagus. Small HH. non-erosive gastritis (biopsy) Normal duodenum.     Plan   - Colonoscopy tomorrow  - Clear liquid diet day before procedure, NPO after midnight  - Please start Golytely @4PM to completion and dulcolax 20mg @9PM evening before procedure   - Please obtain preop labs (coags, CBC, CMP, mag) night before procedure No evidence of active or recent GI bleed  NOrmal esophagus. Small HH. non-erosive gastritis (biopsy) Normal duodenum.     Plan   - CTAP noncon rule out RP bleed  - Colonoscopy tomorrow  - Clear liquid diet day before procedure, NPO after midnight  - Please start Golytely @4PM to completion and dulcolax 20mg @9PM evening before procedure   - Please obtain preop labs (coags, CBC, CMP, mag) night before procedure)

## 2025-01-02 NOTE — PROGRESS NOTE ADULT - ASSESSMENT
74y Male with PMH of HTN, AVR x 3 (1996, 2011, and 2012), chronic Afib on eliquis, s/p CRT for CHB in 2018, stenosis of bioprosthetic aortic valve came to the ed for shortness of breath, and hypotension.    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   - TTE-12/14/2024: Reported history of AVR x 3. Peak transaortic velocity is 4.23 m/s, peak/mean transaortic gradient is 72/46 mmHg with an LVOT/aortic valve VTI ratio of 0.25. EOA 0.96 , moderate eccentric AI, mod to severe MR, moderate pulm HTN   - IV bumex following 1 unit of pRBCs on 12/30, further dose of diuretics based on assessment of volume status   - holding metoprolol , torsemide, resumed entresto today 1/2  - Trend Trops same  - Admitted to tele unit   - Daily weight  - Strict In/Out record   - Cardio gave moderate to high risk assessment for procedure, structural cardiology f/u can be done OP for his AS      #Acute on chronic anemia likely due to GI bleed   - XIMENA in ED positive for melena  - Hemoglobin: 7.9 g/dL (12.30.24 @ 11:45) Hemoglobin: 12.2 g/dL (12.16.24 @ 08:06)   -  Last use of eliquis 2 days prior to presentation  Plan  - Transfused 1 unit PRBC on admission  - started IV PPI BID   - As per GI team- EGD 1/2 negative  - Plan for colonoscopy on 1/3 with prep   - CTAP w/o contrast ruling out retroperitoneal bleed  - NPO tonight before procedure  - Obtain preop labs (coags, CBC, CMP, mag) night before procedure   - Maintain active Type and screen  - Trend H&H BID  - Please place x2 18G IVs  - Please target Hb  >8  - Please avoid any NSAIDs  - If any unstable bleed, please call GI stat    #Left knee pain  -Patient said he has severe OA, and ortho told him that he will need a knee replacement  -Ordered CRP and ESR    #HTN  #AVR   #Chronic afib   - Hold Metoprolol  - Holding Eliquis, aspirin     DVT PPX: none   GI PPX: pantoprazole   DIET: regular diet   ACTIVITY: as tolerated  CODE STATUS: full    DISPOSITION: floor tele

## 2025-01-02 NOTE — PROGRESS NOTE ADULT - ASSESSMENT
74y Male with PMH of HTN, AVR x 3 (1996, 2011, and 2012), chronic Afib on eliquis, s/p CRT for CHB in 2018, stenosis of bioprosthetic aortic valve came to the ed for shortness of breath, and hypotension.    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   - TTE-12/14/2024: Reported history of AVR x 3. Peak transaortic velocity is 4.23 m/s, peak/mean transaortic gradient is 72/46 mmHg with an LVOT/aortic valve VTI ratio of 0.25. EOA 0.96 , moderate eccentric AI, mod to severe MR, moderate pulm HTN   - IV bumex following 1 unit of pRBCs on 12/30, continue IV bumex 1mg with hold parameters for hypotension--will stop bumex 1/2     - hold entresto and metoprolol , torsemide  -  Trops stable at 58  - Daily weight  - Strict In/Out record   - OP structural cardiology  - Cardio consult appreciated - mod-high risk for minor risk procedure      #Acute on chronic anemia likely due to GI bleed   - XIMENA in ED positive   - Hemoglobin: 7.9 g/dL (12.30.24 @ 11:45) Hemoglobin: 12.2 g/dL (12.16.24 @ 08:06)   -  Last use of eliquis 2 days ago  - continue bid h/h  - GI consult appreciated  - EGD 1/2 no sig abnormality  - 1/3 plan for colonoscopy   - continue protonix     #HTN--stopping bumex 1/2 assess for BP to restart metoprolol  #AVR   #Chronic afib   - Hold Metoprolol  - Holding Eliquis, aspirin     #Constipation  -kub noted 12/31  -started senna and mirlax     DVT PPX: none   GI PPX: pantoprazole   DIET: regular diet   ACTIVITY: as tolerated  CODE STATUS: full    DISPOSITION: floor tele  Family: wife updated bedside 12/31  Pending: colonoscopy

## 2025-01-03 ENCOUNTER — TRANSCRIPTION ENCOUNTER (OUTPATIENT)
Age: 75
End: 2025-01-03

## 2025-01-03 VITALS
SYSTOLIC BLOOD PRESSURE: 108 MMHG | DIASTOLIC BLOOD PRESSURE: 56 MMHG | HEART RATE: 101 BPM | RESPIRATION RATE: 18 BRPM | TEMPERATURE: 97 F

## 2025-01-03 LAB
ALBUMIN SERPL ELPH-MCNC: 4.4 G/DL — SIGNIFICANT CHANGE UP (ref 3.5–5.2)
ALP SERPL-CCNC: 51 U/L — SIGNIFICANT CHANGE UP (ref 30–115)
ALT FLD-CCNC: 14 U/L — SIGNIFICANT CHANGE UP (ref 0–41)
ANION GAP SERPL CALC-SCNC: 13 MMOL/L — SIGNIFICANT CHANGE UP (ref 7–14)
AST SERPL-CCNC: 29 U/L — SIGNIFICANT CHANGE UP (ref 0–41)
BILIRUB SERPL-MCNC: 1.1 MG/DL — SIGNIFICANT CHANGE UP (ref 0.2–1.2)
BUN SERPL-MCNC: 20 MG/DL — SIGNIFICANT CHANGE UP (ref 10–20)
CALCIUM SERPL-MCNC: 8.6 MG/DL — SIGNIFICANT CHANGE UP (ref 8.4–10.5)
CHLORIDE SERPL-SCNC: 101 MMOL/L — SIGNIFICANT CHANGE UP (ref 98–110)
CO2 SERPL-SCNC: 23 MMOL/L — SIGNIFICANT CHANGE UP (ref 17–32)
CREAT SERPL-MCNC: 1.3 MG/DL — SIGNIFICANT CHANGE UP (ref 0.7–1.5)
EGFR: 57 ML/MIN/1.73M2 — LOW
FERRITIN SERPL-MCNC: 100 NG/ML — SIGNIFICANT CHANGE UP (ref 30–400)
GLUCOSE SERPL-MCNC: 120 MG/DL — HIGH (ref 70–99)
MAGNESIUM SERPL-MCNC: 2.3 MG/DL — SIGNIFICANT CHANGE UP (ref 1.8–2.4)
POTASSIUM SERPL-MCNC: 3.8 MMOL/L — SIGNIFICANT CHANGE UP (ref 3.5–5)
POTASSIUM SERPL-SCNC: 3.8 MMOL/L — SIGNIFICANT CHANGE UP (ref 3.5–5)
PROT SERPL-MCNC: 6.6 G/DL — SIGNIFICANT CHANGE UP (ref 6–8)
SODIUM SERPL-SCNC: 137 MMOL/L — SIGNIFICANT CHANGE UP (ref 135–146)

## 2025-01-03 PROCEDURE — 45385 COLONOSCOPY W/LESION REMOVAL: CPT

## 2025-01-03 PROCEDURE — 88305 TISSUE EXAM BY PATHOLOGIST: CPT | Mod: 26

## 2025-01-03 PROCEDURE — 99233 SBSQ HOSP IP/OBS HIGH 50: CPT

## 2025-01-03 PROCEDURE — 45390 COLONOSCOPY W/RESECTION: CPT | Mod: 59

## 2025-01-03 PROCEDURE — 45380 COLONOSCOPY AND BIOPSY: CPT | Mod: 59

## 2025-01-03 RX ORDER — TIMOLOL MALEATE 0.5 %
1 DROPS OPHTHALMIC (EYE)
Refills: 0 | DISCHARGE

## 2025-01-03 RX ORDER — PANTOPRAZOLE 40 MG/1
1 TABLET, DELAYED RELEASE ORAL
Qty: 30 | Refills: 0
Start: 2025-01-03 | End: 2025-02-01

## 2025-01-03 RX ORDER — APIXABAN 5 MG/1
1 TABLET, FILM COATED ORAL
Qty: 60 | Refills: 0
Start: 2025-01-03 | End: 2025-02-01

## 2025-01-03 RX ADMIN — ACETAMINOPHEN 650 MILLIGRAM(S): 80 SOLUTION/ DROPS ORAL at 16:51

## 2025-01-03 RX ADMIN — PANTOPRAZOLE 40 MILLIGRAM(S): 40 TABLET, DELAYED RELEASE ORAL at 05:19

## 2025-01-03 RX ADMIN — Medication 17 GRAM(S): at 05:21

## 2025-01-03 NOTE — PROGRESS NOTE ADULT - NUTRITIONAL ASSESSMENT
This patient has been assessed with a concern for Malnutrition and has been determined to have a diagnosis/diagnoses of Moderate protein-calorie malnutrition.    This patient is being managed with:   Diet NPO after Midnight-     NPO Start Date: 01-Jan-2025   NPO Start Time: 23:59  Except Medications  Entered: Jan 1 2025  9:30AM    Diet DASH/TLC-  Sodium & Cholesterol Restricted  Entered: Dec 31 2024  9:09AM    The following pending diet order is being considered for treatment of Moderate protein-calorie malnutrition:  Diet DASH/TLC-  Sodium & Cholesterol Restricted  Supplement Feeding Modality:  Oral  Ensure Pudding Cans or Servings Per Day:  1       Frequency:  Two Times a day  Entered: Jan 1 2025  1:14PM  
This patient has been assessed with a concern for Malnutrition and has been determined to have a diagnosis/diagnoses of Moderate protein-calorie malnutrition.    This patient is being managed with:   Diet Clear Liquid-  Entered: Jan 2 2025 12:24PM    Diet NPO after Midnight-     NPO Start Date: 02-Jan-2025   NPO Start Time: 23:59  Except Medications  Entered: Jan 2 2025 11:28AM    The following pending diet order is being considered for treatment of Moderate protein-calorie malnutrition:  Diet DASH/TLC-  Sodium & Cholesterol Restricted  Supplement Feeding Modality:  Oral  Ensure Pudding Cans or Servings Per Day:  1       Frequency:  Two Times a day  Entered: Jan 1 2025  1:14PM  
This patient has been assessed with a concern for Malnutrition and has been determined to have a diagnosis/diagnoses of Moderate protein-calorie malnutrition.    This patient is being managed with:   Diet DASH/TLC-  Sodium & Cholesterol Restricted  Supplement Feeding Modality:  Oral  Ensure Pudding Cans or Servings Per Day:  1       Frequency:  Two Times a day  Entered: Thierry  3 2025 12:12PM    The following pending diet order is being considered for treatment of Moderate protein-calorie malnutrition:  Diet DASH/TLC-  Sodium & Cholesterol Restricted  Supplement Feeding Modality:  Oral  Ensure Pudding Cans or Servings Per Day:  1       Frequency:  Two Times a day  Entered: Jan 1 2025  1:14PM  
This patient has been assessed with a concern for Malnutrition and has been determined to have a diagnosis/diagnoses of Moderate protein-calorie malnutrition.    This patient is being managed with:   Diet Clear Liquid-  Entered: Jan 2 2025 12:24PM    Diet NPO after Midnight-     NPO Start Date: 02-Jan-2025   NPO Start Time: 23:59  Except Medications  Entered: Jan 2 2025 11:28AM    The following pending diet order is being considered for treatment of Moderate protein-calorie malnutrition:  Diet DASH/TLC-  Sodium & Cholesterol Restricted  Supplement Feeding Modality:  Oral  Ensure Pudding Cans or Servings Per Day:  1       Frequency:  Two Times a day  Entered: Jan 1 2025  1:14PM  
This patient has been assessed with a concern for Malnutrition and has been determined to have a diagnosis/diagnoses of Moderate protein-calorie malnutrition.    This patient is being managed with:   Diet Clear Liquid-  Entered: Jan 2 2025 12:24PM    Diet NPO after Midnight-     NPO Start Date: 02-Jan-2025   NPO Start Time: 23:59  Except Medications  Entered: Jan 2 2025 11:28AM    The following pending diet order is being considered for treatment of Moderate protein-calorie malnutrition:  Diet DASH/TLC-  Sodium & Cholesterol Restricted  Supplement Feeding Modality:  Oral  Ensure Pudding Cans or Servings Per Day:  1       Frequency:  Two Times a day  Entered: Jan 1 2025  1:14PM

## 2025-01-03 NOTE — CHART NOTE - NSCHARTNOTEFT_GEN_A_CORE
PACU ANESTHESIA ADMISSION NOTE      Procedure: Colonoscopy           __x__  Patent Airway    __x__  Full return of protective reflexes    _x___  Full recovery from anesthesia / back to baseline     Vitals:   T: 97.4          R:      12            BP: 90/51                 Sat:    100%               P: 88      Mental Status:  ___x_ Awake   __x___ Alert   _____ Drowsy   _____ Sedated    Nausea/Vomiting:  ____ NO  ______Yes,   x   See Post - Op Orders          Pain Scale (0-10):  _____    Treatment: ____ None    _x___ See Post - Op/PCA Orders    Post - Operative Fluids:   ____ Oral   ___x_ See Post - Op Orders    Plan: Discharge:   ____Home       _x____Floor     _____Critical Care    _____  Other:_________________    Comments:    Pt tolerated procedure well, no anesthesia related complications. Care of pt endorsed to PACU, report given to PACU RN. Discharge when criteria are met.

## 2025-01-03 NOTE — PROGRESS NOTE ADULT - SUBJECTIVE AND OBJECTIVE BOX
RODRICK IQBAL  74y Male    INTERVAL HPI/OVERNIGHT EVENTS:    s/p colonoscopy today  wife at bedside  no bleeding  + SOB on exertion  no pain    T(F): 97 (25 @ 10:26), Max: 98.5 (25 @ 05:28)  HR: 83 (25 @ 11:16) (80 - 116)  BP: 106/60 (25 @ 11:16) (78/41 - 129/66)  RR: 18 (25 @ 11:16) (16 - 20)  SpO2: 96% (25 @ 11:16) (95% - 98%) on RA    I&O's Summary    2025 07:01  -  2025 07:00  --------------------------------------------------------  IN: 440 mL / OUT: 500 mL / NET: -60 mL        Daily Height in cm: 185.42 (2025 08:43)    Daily Weight in k.3 (2025 02:51)    PHYSICAL EXAM:  GENERAL: NAD  HEAD:  Normocephalic  EYES:  conjunctiva and sclera clear  ENMT: Moist mucous membranes  NECK: Supple  NERVOUS SYSTEM:  Alert, awake, Good concentration  CHEST/LUNG: crackles at left base  HEART: Regular rate and rhythm; 3/6 SHA  ABDOMEN: Soft, Nontender, Nondistende  EXTREMITIES: No LE edema  SKIN: warm, dry    Consultant(s) Notes Reviewed:  [x ] YES  [ ] NO  Care Discussed with Consultants/Other Providers [ x] YES  [ ] NO    MEDICATIONS  (STANDING):  fenofibrate Tablet 48 milliGRAM(s) Oral at bedtime  latanoprost 0.005% Ophthalmic Solution 1 Drop(s) Both EYES at bedtime  pantoprazole  Injectable 40 milliGRAM(s) IV Push every 12 hours  polyethylene glycol 3350 17 Gram(s) Oral two times a day  rosuvastatin 10 milliGRAM(s) Oral at bedtime  sacubitril 24 mG/valsartan 26 mG 1 Tablet(s) Oral two times a day  senna 2 Tablet(s) Oral at bedtime    MEDICATIONS  (PRN):  acetaminophen     Tablet .. 650 milliGRAM(s) Oral every 6 hours PRN Temp greater or equal to 38C (100.4F), Mild Pain (1 - 3)  aluminum hydroxide/magnesium hydroxide/simethicone Suspension 30 milliLiter(s) Oral every 4 hours PRN Dyspepsia  melatonin 3 milliGRAM(s) Oral at bedtime PRN Insomnia  ondansetron Injectable 4 milliGRAM(s) IV Push every 8 hours PRN Nausea and/or Vomiting      Telemetry reviewed by me    LABS:                        9.1    7.55  )-----------( 228      ( 2025 21:52 )             27.9         137  |  101  |  20  ----------------------------<  120[H]  3.8   |  23  |  1.3    Ca    8.6      2025 21:52  Mg     2.3         TPro  6.6  /  Alb  4.4  /  TBili  1.1  /  DBili  x   /  AST  29  /  ALT  14  /  AlkPhos  51      PT/INR - ( 2025 21:52 )   PT: 13.10 sec;   INR: 1.11 ratio         PTT - ( 2025 21:52 )  PTT:30.3 sec              RADIOLOGY & ADDITIONAL TESTS:    Imaging report Personally Reviewed:  [x ] YES  [ ] NO      < from: CT Abdomen and Pelvis No Cont (25 @ 16:17) >  IMPRESSION:    No retroperitoneal hematoma or hemoperitoneum.  Incidental left mid ureter 0.4 x 0.3 x 0.2 cm calculus, and right   proximal ureter 0.3 x 0.2 x 0.3 cm calculus, without   hydroureteronephrosis bilaterally    < end of copied text >          < from: EGD (25 @ 14:00) >  Impressions:    Normal mucosa in the whole esophagus.    Hiatal Hernia.    Erythema in the stomach compatible with non-erosive gastritis. (Biopsy).    Normal mucosa in the whole examined duodenum.    < end of copied text >          Case discussed with residents and RN on rounds today    Care discussed with pt and family        
      Subjective:  Feels well, stable regardless of anemia and pallor, no overt GI bleeding, EGD was tolerated well and had no complication, apparently not diagnostic, will have colonoscopy as well  no cardiac complaint, but well aware of AS and necessity for the further w/u    MEDICATIONS  (STANDING):  fenofibrate Tablet 48 milliGRAM(s) Oral at bedtime  latanoprost 0.005% Ophthalmic Solution 1 Drop(s) Both EYES at bedtime  pantoprazole  Injectable 40 milliGRAM(s) IV Push every 12 hours  polyethylene glycol 3350 17 Gram(s) Oral two times a day  rosuvastatin 10 milliGRAM(s) Oral at bedtime  sacubitril 24 mG/valsartan 26 mG 1 Tablet(s) Oral two times a day  senna 2 Tablet(s) Oral at bedtime    MEDICATIONS  (PRN):  acetaminophen     Tablet .. 650 milliGRAM(s) Oral every 6 hours PRN Temp greater or equal to 38C (100.4F), Mild Pain (1 - 3)  aluminum hydroxide/magnesium hydroxide/simethicone Suspension 30 milliLiter(s) Oral every 4 hours PRN Dyspepsia  melatonin 3 milliGRAM(s) Oral at bedtime PRN Insomnia  ondansetron Injectable 4 milliGRAM(s) IV Push every 8 hours PRN Nausea and/or Vomiting            Vital Signs Last 24 Hrs  T(C): 36.3 (03 Jan 2025 08:43), Max: 36.9 (02 Jan 2025 10:35)  T(F): 97.3 (03 Jan 2025 08:41), Max: 98.5 (03 Jan 2025 05:28)  HR: 105 (03 Jan 2025 08:43) (76 - 116)  BP: 126/64 (03 Jan 2025 08:43) (81/52 - 129/66)  BP(mean): 64 (03 Jan 2025 08:43) (64 - 64)  RR: 18 (03 Jan 2025 08:43) (16 - 19)  SpO2: 98% (03 Jan 2025 08:43) (95% - 98%)    Parameters below as of 03 Jan 2025 08:43    O2 Flow (L/min): 96               REVIEW OF SYSTEMS:  CONSTITUTIONAL: no fever, no chills, no diaphoresis  CARDIOLOGY: no chest pain, no SOB, no palpitation,   RESPIRATORY: no dyspnea, no wheeze, no orthopnea, no PND   NEUROLOGICAL: no dizziness, headache,   GI: no abdominal pain, no dyspepsia, no nausea, no vomiting, no diarrhea, but possibly had melena.    SKIN: no ecchymosis, no petechia             PHYSICAL EXAM:  · CONSTITUTIONAL: pale but generally looks stable, in no respiratory distress  . NECK: Supple, no JVD   · RESPIRATORY: Normal air entry to lung base, no wheeze, no crackle, no wet rales  · CARDIOVASCULAR: S1, A2, P2, 2/6 systolic aortic murmur, regular rate,  no rub,  · EXTREMITIES: No cyanosis, no clubbing, no edema  · VASCULAR: Pulses are regular, equal, bilateral in upper and lower extremities  	  TELEMETRY: pm rhythm     ECG: ecg< from: 12 Lead ECG (12.30.24 @ 10:33) >  Atrial-sensed ventricular-paced rhythm with occasional Premature ventricular  complexes  Abnormal ECG    < end of copied text >      TTE:    LABS:                        9.1    7.55  )-----------( 228      ( 02 Jan 2025 21:52 )             27.9     01-02    137  |  101  |  20  ----------------------------<  120[H]  3.8   |  23  |  1.3    Ca    8.6      02 Jan 2025 21:52  Mg     2.3     01-02    TPro  6.6  /  Alb  4.4  /  TBili  1.1  /  DBili  x   /  AST  29  /  ALT  14  /  AlkPhos  51  01-02        PT/INR - ( 02 Jan 2025 21:52 )   PT: 13.10 sec;   INR: 1.11 ratio         PTT - ( 02 Jan 2025 21:52 )  PTT:30.3 sec    I&O's Summary    02 Jan 2025 07:01  -  03 Jan 2025 07:00  --------------------------------------------------------  IN: 440 mL / OUT: 500 mL / NET: -60 mL      BNP  RADIOLOGY & ADDITIONAL STUDIES:    IMPRESSION AND PLAN:        
Gastroenterology progress note:     Patient is a 74y old  Male who presents with a chief complaint of      Admitted on: 12-30-24    We are following the patient for:   anemia    Interval History:    No acute events overnight.   - Diet - tolerating reegular  - last BM - yesterday  - Abdominal pain - denies      PAST MEDICAL & SURGICAL HISTORY:  Atrial fibrillation      H/O: osteoarthritis      Hypertension      Dyslipidemia      History of BPH      H/O endocarditis      DM (diabetes mellitus)      S/P AVR (aortic valve replacement)  complicated by aortic aneurysm and infective endocarditis      H/O aortic aneurysm repair      S/P caroline      H/O knee surgery          MEDICATIONS  (STANDING):  fenofibrate Tablet 48 milliGRAM(s) Oral at bedtime  latanoprost 0.005% Ophthalmic Solution 1 Drop(s) Both EYES at bedtime  pantoprazole  Injectable 40 milliGRAM(s) IV Push every 12 hours  rosuvastatin 10 milliGRAM(s) Oral at bedtime    MEDICATIONS  (PRN):  acetaminophen     Tablet .. 650 milliGRAM(s) Oral every 6 hours PRN Temp greater or equal to 38C (100.4F), Mild Pain (1 - 3)  aluminum hydroxide/magnesium hydroxide/simethicone Suspension 30 milliLiter(s) Oral every 4 hours PRN Dyspepsia  melatonin 3 milliGRAM(s) Oral at bedtime PRN Insomnia  ondansetron Injectable 4 milliGRAM(s) IV Push every 8 hours PRN Nausea and/or Vomiting      Allergies  No Known Allergies      Review of Systems:   Cardiovascular:  No Chest Pain, No Palpitations  Respiratory:  No Cough, No Dyspnea  Gastrointestinal:  As described in HPI  Skin:  No Skin Lesions, No Jaundice  Neuro:  No Syncope, No Dizziness    Physical Examination:  T(C): 36.7 (12-31-24 @ 04:47), Max: 36.7 (12-31-24 @ 04:47)  HR: 64 (12-31-24 @ 08:14) (64 - 101)  BP: 103/53 (12-31-24 @ 08:14) (91/49 - 110/60)  RR: 18 (12-31-24 @ 04:47) (18 - 18)  SpO2: 99% (12-31-24 @ 08:14) (95% - 99%)  Weight (kg): 92.4 (12-31-24 @ 00:54)    12-30-24 @ 07:01  -  12-31-24 @ 07:00  --------------------------------------------------------  IN: 100 mL / OUT: 900 mL / NET: -800 mL    12-31-24 @ 07:01  -  12-31-24 @ 11:02  --------------------------------------------------------  IN: 354 mL / OUT: 300 mL / NET: 54 mL        GENERAL: AAOx3, no acute distress.  HEAD:  Atraumatic, Normocephalic  EYES: conjunctiva and sclera clear  NECK: Supple, no JVD or thyromegaly  CHEST/LUNG: Clear to auscultation bilaterally; No wheeze, rhonchi, or rales  HEART: Regular rate and rhythm; normal S1, S2, No murmurs.  ABDOMEN: Soft, nontender, nondistended; Bowel sounds present  NEUROLOGY: No asterixis or tremor.   SKIN: Intact, no jaundice     Data:                        8.6    7.59  )-----------( 199      ( 31 Dec 2024 06:44 )             26.6     Hgb trend:  8.6  12-31-24 @ 06:44  8.8  12-31-24 @ 00:47  8.8  12-30-24 @ 22:24  7.9  12-30-24 @ 11:45        12-31    140  |  103  |  34[H]  ----------------------------<  106[H]  4.5   |  20  |  1.7[H]    Ca    9.0      31 Dec 2024 06:44  Mg     2.1     12-31    TPro  6.2  /  Alb  4.1  /  TBili  1.5[H]  /  DBili  x   /  AST  25  /  ALT  10  /  AlkPhos  39  12-31    Liver panel trend:  TBili 1.5   /   AST 25   /   ALT 10   /   AlkP 39   /   Tptn 6.2   /   Alb 4.1    /   DBili --      12-31  TBili 1.6   /   AST 23   /   ALT 10   /   AlkP 38   /   Tptn 6.0   /   Alb 4.1    /   DBili --      12-30  TBili 0.5   /   AST 23   /   ALT 10   /   AlkP 35   /   Tptn 6.4   /   Alb 4.1    /   DBili --      12-30      PT/INR - ( 30 Dec 2024 13:50 )   PT: 13.70 sec;   INR: 1.16 ratio         PTT - ( 30 Dec 2024 13:50 )  PTT:32.5 sec       Radiology:      
Gastroenterology progress note:     Patient is a 74y old  Male who presents with a chief complaint of Melena     (01 Jan 2025 12:56)       Admitted on: 12-30-24    We are following the patient for:  anemia    Interval History:    No acute events overnight. Seen by cardiology. Deemed intermediate to high risk for EGD. NO bloody BM. Not requiring oxygen     PAST MEDICAL & SURGICAL HISTORY:  Atrial fibrillation      H/O: osteoarthritis      Hypertension      Dyslipidemia      History of BPH      H/O endocarditis      DM (diabetes mellitus)      S/P AVR (aortic valve replacement)  complicated by aortic aneurysm and infective endocarditis      H/O aortic aneurysm repair      S/P caroline      H/O knee surgery          MEDICATIONS  (STANDING):  buMETAnide Injectable 1 milliGRAM(s) IV Push daily  fenofibrate Tablet 48 milliGRAM(s) Oral at bedtime  latanoprost 0.005% Ophthalmic Solution 1 Drop(s) Both EYES at bedtime  pantoprazole  Injectable 40 milliGRAM(s) IV Push every 12 hours  polyethylene glycol 3350 17 Gram(s) Oral two times a day  rosuvastatin 10 milliGRAM(s) Oral at bedtime  senna 2 Tablet(s) Oral at bedtime    MEDICATIONS  (PRN):  acetaminophen     Tablet .. 650 milliGRAM(s) Oral every 6 hours PRN Temp greater or equal to 38C (100.4F), Mild Pain (1 - 3)  aluminum hydroxide/magnesium hydroxide/simethicone Suspension 30 milliLiter(s) Oral every 4 hours PRN Dyspepsia  melatonin 3 milliGRAM(s) Oral at bedtime PRN Insomnia  ondansetron Injectable 4 milliGRAM(s) IV Push every 8 hours PRN Nausea and/or Vomiting      Allergies  No Known Allergies      Review of Systems:   Cardiovascular:  No Chest Pain, No Palpitations  Respiratory:  No Cough, No Dyspnea  Gastrointestinal:  As described in HPI  Skin:  No Skin Lesions, No Jaundice  Neuro:  No Syncope, No Dizziness    Physical Examination:  T(C): 36.9 (01-01-25 @ 13:06), Max: 36.9 (01-01-25 @ 05:33)  HR: 94 (01-01-25 @ 13:06) (91 - 109)  BP: 130/64 (01-01-25 @ 13:06) (101/60 - 130/64)  RR: 17 (01-01-25 @ 13:06) (17 - 18)  SpO2: 95% (01-01-25 @ 05:33) (95% - 95%)      12-31-24 @ 07:01  -  01-01-25 @ 07:00  --------------------------------------------------------  IN: 2155 mL / OUT: 700 mL / NET: 1455 mL    01-01-25 @ 07:01  -  01-01-25 @ 16:09  --------------------------------------------------------  IN: 785 mL / OUT: 0 mL / NET: 785 mL        GENERAL: AAOx3, no acute distress.  HEAD:  Atraumatic, Normocephalic  EYES: conjunctiva and sclera clear  NECK: Supple, no JVD or thyromegaly  CHEST/LUNG: Clear to auscultation bilaterally; No wheeze, rhonchi, or rales  HEART: Regular rate and rhythm; normal S1, S2, No murmurs.  ABDOMEN: Soft, nontender, nondistended; Bowel sounds present  NEUROLOGY: No asterixis or tremor.   SKIN: Intact, no jaundice     Data:                        9.4    9.11  )-----------( 223      ( 01 Jan 2025 08:41 )             28.9     Hgb trend:  9.4  01-01-25 @ 08:41  9.3  12-31-24 @ 17:00  8.6  12-31-24 @ 06:44  8.8  12-31-24 @ 00:47  8.8  12-30-24 @ 22:24  7.9  12-30-24 @ 11:45        01-01    138  |  102  |  27[H]  ----------------------------<  155[H]  3.9   |  24  |  1.5    Ca    8.5      01 Jan 2025 08:41  Mg     2.2     01-01    TPro  6.7  /  Alb  4.7  /  TBili  1.0  /  DBili  x   /  AST  27  /  ALT  13  /  AlkPhos  46  01-01    Liver panel trend:  TBili 1.0   /   AST 27   /   ALT 13   /   AlkP 46   /   Tptn 6.7   /   Alb 4.7    /   DBili --      01-01  TBili 1.5   /   AST 25   /   ALT 10   /   AlkP 39   /   Tptn 6.2   /   Alb 4.1    /   DBili --      12-31  TBili 1.6   /   AST 23   /   ALT 10   /   AlkP 38   /   Tptn 6.0   /   Alb 4.1    /   DBili --      12-30  TBili 0.5   /   AST 23   /   ALT 10   /   AlkP 35   /   Tptn 6.4   /   Alb 4.1    /   DBili --      12-30             Radiology:      
Patient is a 74y old  Male who presents with a chief complaint of Melena     (01 Jan 2025 12:56)      Patient seen and examined at bedside.  Patient denies any chest pain or shortness of breath   ALLERGIES:  No Known Allergies    MEDICATIONS:  acetaminophen     Tablet .. 650 milliGRAM(s) Oral every 6 hours PRN  aluminum hydroxide/magnesium hydroxide/simethicone Suspension 30 milliLiter(s) Oral every 4 hours PRN  bisacodyl 20 milliGRAM(s) Oral once  buMETAnide Injectable 1 milliGRAM(s) IV Push daily  fenofibrate Tablet 48 milliGRAM(s) Oral at bedtime  latanoprost 0.005% Ophthalmic Solution 1 Drop(s) Both EYES at bedtime  melatonin 3 milliGRAM(s) Oral at bedtime PRN  ondansetron Injectable 4 milliGRAM(s) IV Push every 8 hours PRN  pantoprazole  Injectable 40 milliGRAM(s) IV Push every 12 hours  polyethylene glycol 3350 17 Gram(s) Oral two times a day  polyethylene glycol/electrolyte Solution. 4000 milliLiter(s) Oral once  rosuvastatin 10 milliGRAM(s) Oral at bedtime  sacubitril 24 mG/valsartan 26 mG 1 Tablet(s) Oral two times a day  senna 2 Tablet(s) Oral at bedtime    Vital Signs Last 24 Hrs  T(F): 97.5 (02 Jan 2025 12:43), Max: 98.7 (02 Jan 2025 05:52)  HR: 86 (02 Jan 2025 12:43) (76 - 105)  BP: 115/60 (02 Jan 2025 12:43) (81/52 - 115/60)  RR: 18 (02 Jan 2025 12:43) (16 - 19)  SpO2: 97% (02 Jan 2025 11:05) (95% - 98%)  I&O's Summary    01 Jan 2025 07:01  -  02 Jan 2025 07:00  --------------------------------------------------------  IN: 985 mL / OUT: 0 mL / NET: 985 mL    02 Jan 2025 07:01  -  02 Jan 2025 18:01  --------------------------------------------------------  IN: 220 mL / OUT: 500 mL / NET: -280 mL        PHYSICAL EXAM:  General: NAD, A/O x 3  ENT: MMM  Neck: Supple, No JVD  Lungs: Clear to auscultation bilaterally  Cardio: RRR, S1/S2, 3/6 murmur   Abdomen: Soft, Nontender, Nondistended; Bowel sounds present  Extremities: No cyanosis, No edema, left knee examined-no erythema or edema, no crepitus on passive range of motion     LABS:                        9.5    9.63  )-----------( 228      ( 02 Jan 2025 12:03 )             29.4     01-02    140  |  102  |  22  ----------------------------<  128  4.1   |  26  |  1.5    Ca    8.8      02 Jan 2025 12:03  Mg     2.2     01-01    TPro  6.9  /  Alb  4.4  /  TBili  1.1  /  DBili  x   /  AST  26  /  ALT  13  /  AlkPhos  52  01-02      PT/INR - ( 01 Jan 2025 21:48 )   PT: 13.50 sec;   INR: 1.14 ratio         PTT - ( 01 Jan 2025 21:48 )  PTT:30.6 sec                          Urinalysis Basic - ( 02 Jan 2025 12:03 )    Color: x / Appearance: x / SG: x / pH: x  Gluc: 128 mg/dL / Ketone: x  / Bili: x / Urobili: x   Blood: x / Protein: x / Nitrite: x   Leuk Esterase: x / RBC: x / WBC x   Sq Epi: x / Non Sq Epi: x / Bacteria: x            RADIOLOGY & ADDITIONAL TESTS:    Care Discussed with Consultants/Other Providers: 
SUBJECTIVE/OVERNIGHT EVENTS  Today is hospital day 1d. This morning patient was seen and examined at bedside, resting comfortably in bed. No acute or major events overnight.    MEDICATIONS  STANDING MEDICATIONS  fenofibrate Tablet 48 milliGRAM(s) Oral at bedtime  latanoprost 0.005% Ophthalmic Solution 1 Drop(s) Both EYES at bedtime  pantoprazole  Injectable 40 milliGRAM(s) IV Push every 12 hours  rosuvastatin 10 milliGRAM(s) Oral at bedtime    PRN MEDICATIONS  acetaminophen     Tablet .. 650 milliGRAM(s) Oral every 6 hours PRN  aluminum hydroxide/magnesium hydroxide/simethicone Suspension 30 milliLiter(s) Oral every 4 hours PRN  melatonin 3 milliGRAM(s) Oral at bedtime PRN  ondansetron Injectable 4 milliGRAM(s) IV Push every 8 hours PRN    VITALS  T(F): 98 (12-31-24 @ 04:47), Max: 98 (12-31-24 @ 04:47)  HR: 64 (12-31-24 @ 08:14) (64 - 101)  BP: 103/53 (12-31-24 @ 08:14) (91/49 - 110/60)  RR: 18 (12-31-24 @ 04:47) (18 - 18)  SpO2: 99% (12-31-24 @ 08:14) (95% - 99%)  POCT Blood Glucose.: 99 mg/dL (12-30-24 @ 16:46)    PHYSICAL EXAM  CCO*3  GBAE, clear lungs  Regular S1S2, no murmurs  Soft abdomen, non tender  No LLE      LABS             8.6    7.59  )-----------( 199      ( 12-31-24 @ 06:44 )             26.6     140  |  103  |  34  -------------------------<  106   12-31-24 @ 06:44  4.5  |  20  |  1.7    Ca      9.0     12-31-24 @ 06:44  Mg     2.1     12-31-24 @ 06:44    TPro  6.2  /  Alb  4.1  /  TBili  1.5  /  DBili  x   /  AST  25  /  ALT  10  /  AlkPhos  39  /  GGT  x     12-31-24 @ 06:44    PT/INR - ( 12-30-24 @ 13:50 )   PT: 13.70 sec[H];   INR: 1.16 ratio  PTT - ( 12-30-24 @ 13:50 )  PTT:32.5 sec    Troponin T, High Sensitivity Result: 58 ng/L (12-31-24 @ 00:47)  Troponin T, High Sensitivity Result: 56 ng/L (12-30-24 @ 22:24)  Troponin T, High Sensitivity Result: 58 ng/L (12-30-24 @ 13:50)    Urinalysis Basic - ( 31 Dec 2024 06:44 )    Color: x / Appearance: x / SG: x / pH: x  Gluc: 106 mg/dL / Ketone: x  / Bili: x / Urobili: x   Blood: x / Protein: x / Nitrite: x   Leuk Esterase: x / RBC: x / WBC x   Sq Epi: x / Non Sq Epi: x / Bacteria: x          IMAGING        
SUBJECTIVE/OVERNIGHT EVENTS  Today is hospital day 3d. This morning patient was seen and examined at bedside, resting comfortably in bed. No acute or major events overnight.    MEDICATIONS  STANDING MEDICATIONS  bisacodyl 20 milliGRAM(s) Oral once  buMETAnide Injectable 1 milliGRAM(s) IV Push daily  fenofibrate Tablet 48 milliGRAM(s) Oral at bedtime  latanoprost 0.005% Ophthalmic Solution 1 Drop(s) Both EYES at bedtime  pantoprazole  Injectable 40 milliGRAM(s) IV Push every 12 hours  polyethylene glycol 3350 17 Gram(s) Oral two times a day  polyethylene glycol/electrolyte Solution. 4000 milliLiter(s) Oral once  rosuvastatin 10 milliGRAM(s) Oral at bedtime  sacubitril 24 mG/valsartan 26 mG 1 Tablet(s) Oral two times a day  senna 2 Tablet(s) Oral at bedtime    PRN MEDICATIONS  acetaminophen     Tablet .. 650 milliGRAM(s) Oral every 6 hours PRN  aluminum hydroxide/magnesium hydroxide/simethicone Suspension 30 milliLiter(s) Oral every 4 hours PRN  melatonin 3 milliGRAM(s) Oral at bedtime PRN  ondansetron Injectable 4 milliGRAM(s) IV Push every 8 hours PRN    VITALS  T(F): 97.5 (01-02-25 @ 12:43), Max: 98.7 (01-02-25 @ 05:52)  HR: 86 (01-02-25 @ 12:43) (76 - 105)  BP: 115/60 (01-02-25 @ 12:43) (81/52 - 115/60)  RR: 18 (01-02-25 @ 12:43) (16 - 19)  SpO2: 97% (01-02-25 @ 11:05) (95% - 98%)    PHYSICAL EXAM  CCO*3  GBAE, clear lungs  Regular S1S2, no murmurs  Soft abdomen, non tender  No LLE      LABS             9.5    9.63  )-----------( 228      ( 01-02-25 @ 12:03 )             29.4     140  |  102  |  22  -------------------------<  128   01-02-25 @ 12:03  4.1  |  26  |  1.5    Ca      8.8     01-02-25 @ 12:03  Mg     2.2     01-01-25 @ 08:41    TPro  6.9  /  Alb  4.4  /  TBili  1.1  /  DBili  x   /  AST  26  /  ALT  13  /  AlkPhos  52  /  GGT  x     01-02-25 @ 12:03    PT/INR - ( 01-01-25 @ 21:48 )   PT: 13.50 sec[H];   INR: 1.14 ratio  PTT - ( 01-01-25 @ 21:48 )  PTT:30.6 sec    Troponin T, High Sensitivity Result: 58 ng/L (12-31-24 @ 00:47)  Troponin T, High Sensitivity Result: 56 ng/L (12-30-24 @ 22:24)  Troponin T, High Sensitivity Result: 58 ng/L (12-30-24 @ 13:50)    Urinalysis Basic - ( 02 Jan 2025 12:03 )    Color: x / Appearance: x / SG: x / pH: x  Gluc: 128 mg/dL / Ketone: x  / Bili: x / Urobili: x   Blood: x / Protein: x / Nitrite: x   Leuk Esterase: x / RBC: x / WBC x   Sq Epi: x / Non Sq Epi: x / Bacteria: x          IMAGING        
SUBJECTIVE/OVERNIGHT EVENTS  Today is hospital day 4d. This morning patient was seen and examined at bedside, resting comfortably in bed. No acute or major events overnight.    MEDICATIONS  STANDING MEDICATIONS  fenofibrate Tablet 48 milliGRAM(s) Oral at bedtime  latanoprost 0.005% Ophthalmic Solution 1 Drop(s) Both EYES at bedtime  pantoprazole  Injectable 40 milliGRAM(s) IV Push every 12 hours  polyethylene glycol 3350 17 Gram(s) Oral two times a day  rosuvastatin 10 milliGRAM(s) Oral at bedtime  sacubitril 24 mG/valsartan 26 mG 1 Tablet(s) Oral two times a day  senna 2 Tablet(s) Oral at bedtime    PRN MEDICATIONS  acetaminophen     Tablet .. 650 milliGRAM(s) Oral every 6 hours PRN  aluminum hydroxide/magnesium hydroxide/simethicone Suspension 30 milliLiter(s) Oral every 4 hours PRN  melatonin 3 milliGRAM(s) Oral at bedtime PRN  ondansetron Injectable 4 milliGRAM(s) IV Push every 8 hours PRN    VITALS  T(F): 97 (01-03-25 @ 10:26), Max: 98.5 (01-03-25 @ 05:28)  HR: 83 (01-03-25 @ 10:56) (80 - 116)  BP: 80/45 (01-03-25 @ 10:56) (78/41 - 129/66)  RR: 20 (01-03-25 @ 10:56) (16 - 20)  SpO2: 96% (01-03-25 @ 10:56) (95% - 98%)    PHYSICAL EXAM  CCO*3  GBAE, clear lungs  Regular S1S2, no murmurs  Soft abdomen, non tender  No LLE      LABS             9.1    7.55  )-----------( 228      ( 01-02-25 @ 21:52 )             27.9     137  |  101  |  20  -------------------------<  120   01-02-25 @ 21:52  3.8  |  23  |  1.3    Ca      8.6     01-02-25 @ 21:52  Mg     2.3     01-02-25 @ 21:52    TPro  6.6  /  Alb  4.4  /  TBili  1.1  /  DBili  x   /  AST  29  /  ALT  14  /  AlkPhos  51  /  GGT  x     01-02-25 @ 21:52    PT/INR - ( 01-02-25 @ 21:52 )   PT: 13.10 sec[H];   INR: 1.11 ratio  PTT - ( 01-02-25 @ 21:52 )  PTT:30.3 sec      Urinalysis Basic - ( 02 Jan 2025 21:52 )    Color: x / Appearance: x / SG: x / pH: x  Gluc: 120 mg/dL / Ketone: x  / Bili: x / Urobili: x   Blood: x / Protein: x / Nitrite: x   Leuk Esterase: x / RBC: x / WBC x   Sq Epi: x / Non Sq Epi: x / Bacteria: x          IMAGING        
  RASHEED, RODRICK  74y Male    INTERVAL HPI/OVERNIGHT EVENTS:    has tickle in his throat  otherwise, no SOB, chest pain, N/V, bleeding  wife at bedside  case discussed with GI fellow - for EGD on 1/2    T(F): 98.4 (01-01-25 @ 13:06), Max: 98.4 (01-01-25 @ 05:33)  HR: 94 (01-01-25 @ 13:06) (91 - 109)  BP: 130/64 (01-01-25 @ 13:06) (101/60 - 130/64)  RR: 17 (01-01-25 @ 13:06) (17 - 18)  SpO2: 95% (01-01-25 @ 05:33) (95% - 95%) on RA  I&O's Summary    31 Dec 2024 07:01  -  01 Jan 2025 07:00  --------------------------------------------------------  IN: 2155 mL / OUT: 700 mL / NET: 1455 mL      CAPILLARY BLOOD GLUCOSE      POCT Blood Glucose.: 178 mg/dL (01 Jan 2025 11:30)        PHYSICAL EXAM:  GENERAL: NAD  HEAD:  Normocephalic  EYES:  conjunctiva and sclera clear  ENMT: Moist mucous membranes  NERVOUS SYSTEM:  Alert, awake, Good concentration  CHEST/LUNG: CTA b/l; No rales, rhonchi, wheezing  HEART: Regular rate and rhythm; 3/6 SHA  ABDOMEN: Soft, Nontender, distended; Bowel sounds present  EXTREMITIES:   No edema LE  SKIN: warm, dry    Consultant(s) Notes Reviewed:  [x ] YES  [ ] NO  Care Discussed with Consultants/Other Providers [ x] YES  [ ] NO    MEDICATIONS  (STANDING):  buMETAnide Injectable 1 milliGRAM(s) IV Push daily  fenofibrate Tablet 48 milliGRAM(s) Oral at bedtime  latanoprost 0.005% Ophthalmic Solution 1 Drop(s) Both EYES at bedtime  pantoprazole  Injectable 40 milliGRAM(s) IV Push every 12 hours  polyethylene glycol 3350 17 Gram(s) Oral two times a day  rosuvastatin 10 milliGRAM(s) Oral at bedtime  senna 2 Tablet(s) Oral at bedtime    MEDICATIONS  (PRN):  acetaminophen     Tablet .. 650 milliGRAM(s) Oral every 6 hours PRN Temp greater or equal to 38C (100.4F), Mild Pain (1 - 3)  aluminum hydroxide/magnesium hydroxide/simethicone Suspension 30 milliLiter(s) Oral every 4 hours PRN Dyspepsia  melatonin 3 milliGRAM(s) Oral at bedtime PRN Insomnia  ondansetron Injectable 4 milliGRAM(s) IV Push every 8 hours PRN Nausea and/or Vomiting      LABS:                        9.4    9.11  )-----------( 223      ( 01 Jan 2025 08:41 )             28.9     01-01    138  |  102  |  27[H]  ----------------------------<  155[H]  3.9   |  24  |  1.5    Ca    8.5      01 Jan 2025 08:41  Mg     2.2     01-01    TPro  6.7  /  Alb  4.7  /  TBili  1.0  /  DBili  x   /  AST  27  /  ALT  13  /  AlkPhos  46  01-01                RADIOLOGY & ADDITIONAL TESTS:    Imaging and report Personally Reviewed:  [x ] YES  [ ] NO    < from: Xray Kidney Ureter Bladder (12.31.24 @ 11:54) >  Impression:    Nonobstructive bowel gas pattern.    < end of copied text >  < from: Xray Chest 1 View- PORTABLE-Urgent (12.30.24 @ 11:23) >  IMPRESSION:    Bilateral opacities/left pleural effusion. Redemonstration cardiomegaly.      < end of copied text >            Case discussed with residents and RN on rounds today    Care discussed with pt and family        
Patient is a 74y old  Male who presents with a chief complaint of     Patient seen and examined at bedside.  Patient reports shortness of breath with ambulation, no chest pain   ALLERGIES:  No Known Allergies    MEDICATIONS:  acetaminophen     Tablet .. 650 milliGRAM(s) Oral every 6 hours PRN  aluminum hydroxide/magnesium hydroxide/simethicone Suspension 30 milliLiter(s) Oral every 4 hours PRN  fenofibrate Tablet 48 milliGRAM(s) Oral at bedtime  heparin   Injectable 5000 Unit(s) SubCutaneous every 12 hours  latanoprost 0.005% Ophthalmic Solution 1 Drop(s) Both EYES at bedtime  melatonin 3 milliGRAM(s) Oral at bedtime PRN  ondansetron Injectable 4 milliGRAM(s) IV Push every 8 hours PRN  pantoprazole  Injectable 40 milliGRAM(s) IV Push every 12 hours  polyethylene glycol 3350 17 Gram(s) Oral two times a day  rosuvastatin 10 milliGRAM(s) Oral at bedtime  senna 2 Tablet(s) Oral at bedtime    Vital Signs Last 24 Hrs  T(F): 98.1 (31 Dec 2024 12:30), Max: 98.1 (31 Dec 2024 12:30)  HR: 106 (31 Dec 2024 12:30) (64 - 106)  BP: 123/63 (31 Dec 2024 12:30) (91/49 - 123/63)  RR: 17 (31 Dec 2024 12:30) (17 - 18)  SpO2: 99% (31 Dec 2024 08:14) (95% - 99%)  I&O's Summary    30 Dec 2024 07:01  -  31 Dec 2024 07:00  --------------------------------------------------------  IN: 100 mL / OUT: 900 mL / NET: -800 mL    31 Dec 2024 07:01  -  31 Dec 2024 15:47  --------------------------------------------------------  IN: 575 mL / OUT: 300 mL / NET: 275 mL        PHYSICAL EXAM:  General: NAD, A/O x 3  ENT: MMM  Neck: Supple, No JVD  Lungs: Clear to auscultation bilaterally  Cardio: RRR, S1/S2, 4/6 murmur   Abdomen: Soft, Nontender, Nondistended; Bowel sounds present  Extremities: No cyanosis, +1 LE edema    LABS:                        8.6    7.59  )-----------( 199      ( 31 Dec 2024 06:44 )             26.6     12-31    140  |  103  |  34  ----------------------------<  106  4.5   |  20  |  1.7    Ca    9.0      31 Dec 2024 06:44  Mg     2.1     12-31    TPro  6.2  /  Alb  4.1  /  TBili  1.5  /  DBili  x   /  AST  25  /  ALT  10  /  AlkPhos  39  12-31      PT/INR - ( 30 Dec 2024 13:50 )   PT: 13.70 sec;   INR: 1.16 ratio         PTT - ( 30 Dec 2024 13:50 )  PTT:32.5 sec                      POCT Blood Glucose.: 99 mg/dL (30 Dec 2024 16:46)      Urinalysis Basic - ( 31 Dec 2024 06:44 )    Color: x / Appearance: x / SG: x / pH: x  Gluc: 106 mg/dL / Ketone: x  / Bili: x / Urobili: x   Blood: x / Protein: x / Nitrite: x   Leuk Esterase: x / RBC: x / WBC x   Sq Epi: x / Non Sq Epi: x / Bacteria: x            RADIOLOGY & ADDITIONAL TESTS:    Care Discussed with Consultants/Other Providers:

## 2025-01-03 NOTE — DISCHARGE NOTE PROVIDER - HOSPITAL COURSE
74y Male with PMH of HTN, AVR x 3 (1996, 2011, and 2012), chronic Afib, s/p CRT for CHB in 2018, stenosis of bioprosthetic aortic valve came to the ed for shortness of breath. Hx taken from the patient and is aox3 at time of the interview. Hx also provided by the daughter and wife at bedside. States that for the past few days, he has been worsening of his shortness of breath especially on exertion. Recently he has been so short of breath that he is now using pillows under his head during night, and cant lie flat and have to incline his bed up.  Family states that patient is also looking more pale than his baseline. He states for the past 2 days his blood pressure has been too low & 80/38 mmhg) to take his hypertensive and CHF medications. Review of the systems is negative for headache, dizziness, chest pain, palpitations, nausea, vomit, diarrhea, dysuria, frequency, urgency and other significant abdomen complaints.     Labs    hb 7.9, trops 52, 58, creat 1.8, bnp 2100, gfr 39    IMAGING    xray chest:   Bilateral opacities/left pleural effusion. Re-demonstration cardiomegaly.    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   - TTE-12/14/2024: Reported history of AVR x 3. Peak transaortic velocity is 4.23 m/s, peak/mean transaortic gradient is 72/46 mmHg with an LVOT/aortic valve VTI ratio of 0.25. EOA 0.96 , moderate eccentric AI, mod to severe MR, moderate pulm HTN   - IV bumex following 1 unit of pRBCs on 12/30, received further diuresis, now euvolemic  - holding metoprolol , torsemide, resumed entresto on 1/2  - Trend Trops same  - Cardio gave moderate to high risk assessment for procedure, structural cardiology f/u can be done OP for his AS    #Acute on chronic anemia likely due to GI bleed   - XIMENA in ED positive for melena  - Hemoglobin: 7.9 g/dL (12.30.24 @ 11:45) Hemoglobin: 12.2 g/dL (12.16.24 @ 08:06)   -  Last use of eliquis 2 days prior to presentation  Plan  - Transfused 1 unit PRBC on admission  - started IV PPI BID   - As per GI team- EGD 1/2 negative, only non-erosive gastritis  - Clonoscopy on 1/3:  - CTAP w/o contrast negative ruling out retroperitoneal bleed  -Hb stable and no further bleeding episodes    #Left knee pain  -Patient said he has severe OA, and ortho told him that he will need a knee replacement    #HTN  #AVR   #Chronic afib   - Holding Metoprolol here  - As per Dr. Foster will stop aspirin, resume eliquis 2.5 BID if colono negative

## 2025-01-03 NOTE — DISCHARGE NOTE PROVIDER - CARE PROVIDER_API CALL
Jace Vital  Pediatrics  [facility] [place], [place] [id number]  Phone: [phone]  Fax: ()-  Follow Up Time: 2 weeks

## 2025-01-03 NOTE — DISCHARGE NOTE PROVIDER - NSDCCPCAREPLAN_GEN_ALL_CORE_FT
PRINCIPAL DISCHARGE DIAGNOSIS  Diagnosis: Decompensated heart failure  Assessment and Plan of Treatment: You came in with shortness of breath and drop in oxygen saturation. You wre found to be in fluid overload (fluids building up in your lungs due to your heart issue)/ You required a diuretic to be given IV to get rid of the extra fluids. Now you don't have fluids remaining, and you will have to make sure to take your medications including an oral diuretic to prevent this from recurring. You also need to limit any sodium/salt intake, and your fluids intake (around 1 liter per day). You will need to follow up with your cardiologist as OP in 1 week.      SECONDARY DISCHARGE DIAGNOSES  Diagnosis: Drop in hemoglobin  Assessment and Plan of Treatment: You had a drop in your hemoglobin level. GI saw you, and we held your eliquis and aspirin. EGD was done and did not show a bleeding source. Colono was also done, and no active bleeding site identified. As per cardiology, we can stop aspirin and only continue with eliquis at a lower dose of 2.5 mg instead of 5 mg. Please monitor for any further blood in stools or black stools.     PRINCIPAL DISCHARGE DIAGNOSIS  Diagnosis: Decompensated heart failure  Assessment and Plan of Treatment: You came in with shortness of breath and drop in oxygen saturation. You wre found to be in fluid overload (fluids building up in your lungs due to your heart issue)/ You required a diuretic to be given IV to get rid of the extra fluids. Your volume status is now improved. Resume Entresto and torsemide for now. Resume metoprolol when cleared by Dr. Foster. You also need to limit any sodium/salt intake, and your fluids intake (around 1 liter per day). You will need to follow up with your cardiologist in 1 week.  You will also follow up with structural heart about the aortic valve as outpatient.      SECONDARY DISCHARGE DIAGNOSES  Diagnosis: Drop in hemoglobin  Assessment and Plan of Treatment: You had a drop in your hemoglobin level. GI saw you, and we held your eliquis and aspirin. EGD was done and did not show a bleeding source. Colono was also done, and no active bleeding site identified. As per cardiology, we can stop aspirin and only continue with eliquis at a lower dose of 2.5 mg instead of 5 mg. Please restart Eliquis 2.5mg twice a day starting 1/6/25. Please monitor for any further blood in stools or black stools. Follow up with gastroenterology for biopsy results and you need to complete anemia workup as outpatient. You need a repeat colonoscopy in 3 years.

## 2025-01-03 NOTE — PROGRESS NOTE ADULT - ASSESSMENT
74y Male with PMH of HTN, AVR x 3 (1996, 2011, and 2012), chronic Afib on eliquis, s/p CRT for CHB in 2018, stenosis of bioprosthetic aortic valve came to the ed for shortness of breath, and hypotension.    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   - TTE-12/14/2024: Reported history of AVR x 3. Peak transaortic velocity is 4.23 m/s, peak/mean transaortic gradient is 72/46 mmHg with an LVOT/aortic valve VTI ratio of 0.25. EOA 0.96 , moderate eccentric AI, mod to severe MR, moderate pulm HTN   - IV bumex following 1 unit of pRBCs on 12/30, further dose of diuretics based on assessment of volume status   - holding metoprolol , torsemide, resumed entresto on 1/2  - Trend Trops same  - Admitted to tele unit   - Daily weight  - Strict In/Out record   - Cardio gave moderate to high risk assessment for procedure, structural cardiology f/u can be done OP for his AS      #Acute on chronic anemia likely due to GI bleed   - XIMENA in ED positive for melena  - Hemoglobin: 7.9 g/dL (12.30.24 @ 11:45) Hemoglobin: 12.2 g/dL (12.16.24 @ 08:06)   -  Last use of eliquis 2 days prior to presentation  Plan  - Transfused 1 unit PRBC on admission  - started IV PPI BID   - As per GI team- EGD 1/2 negative  - Plan for colonoscopy on 1/3   - CTAP w/o contrast negative ruling out retroperitoneal bleed  - Maintain active Type and screen  - Trend H&H BID  - Please place x2 18G IVs  - Please target Hb  >8  - Please avoid any NSAIDs  - If any unstable bleed, please call GI stat    #Left knee pain  -Patient said he has severe OA, and ortho told him that he will need a knee replacement  -Ordered CRP and ESR    #HTN  #AVR   #Chronic afib   - Hold Metoprolol  - As per Dr. Foster will stop aspirin, resume eliquis 2.5 BID if colono negative    DVT PPX: none   GI PPX: pantoprazole   DIET: regular diet   ACTIVITY: as tolerated  CODE STATUS: full    DISPOSITION: floor tele

## 2025-01-03 NOTE — DISCHARGE NOTE NURSING/CASE MANAGEMENT/SOCIAL WORK - FINANCIAL ASSISTANCE
[facility] provides services at a reduced cost to those who are determined to be eligible through [facility]’s financial assistance program. Information regarding [facility]’s financial assistance program can be found by going to [email or web address] or by calling [phone].

## 2025-01-03 NOTE — PROGRESS NOTE ADULT - PROVIDER SPECIALTY LIST ADULT
Cardiology
Gastroenterology
Hospitalist
Hospitalist
Internal Medicine
Internal Medicine
Gastroenterology
Hospitalist
Hospitalist
Internal Medicine

## 2025-01-03 NOTE — CHART NOTE - NSCHARTNOTEFT_GEN_A_CORE
Internal hemorrhoids.  	Mild diverticulosis of the sigmoid colon.  	Polyp (6 mm) in the ascending colon. (Polypectomy, Endoclip).  	Polyp (8 mm) in the descending colon. (Mucosal Resection, Endoclip).  	Polyp (2 mm) in the descending colon. (Biopsy).  	Polyp (5 mm) in the colon. (Polypectomy, Endoclip).  	The colon and terminal ileum were otherwise unremarkable. No source of bleeding identified. .    Rec  Await pathology   Can resume necessary AC     Advance diet as tolerated    Repeat Colonoscopy in 3 years (polyps)  Follow up IN GI clinic

## 2025-01-03 NOTE — DISCHARGE NOTE PROVIDER - DETAILS OF MALNUTRITION DIAGNOSIS/DIAGNOSES
This patient has been assessed with a concern for Malnutrition and was treated during this hospitalization for the following Nutrition diagnosis/diagnoses:     -  01/01/2025: Moderate protein-calorie malnutrition

## 2025-01-03 NOTE — PROGRESS NOTE ADULT - ASSESSMENT
73 y/o man with PMH of HTN, AVR x 3 (1996, 2011, and 2012), chronic Afib on Eliquis, s/p CRT for CHB in 2018 and stenosis of bioprosthetic aortic valve came to the ED for shortness of breath and hypotension.    1. 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   - TTE-12/14/2024: Reported history of AVR x 3. Peak transaortic velocity is 4.23 m/s, peak/mean transaortic gradient is 72/46 mmHg with an LVOT/aortic valve VTI ratio of 0.25. EOA 0.96 , moderate eccentric AI, mod to severe MR, moderate pulm HTN   - IV bumex was given after 1 unit of pRBCs on 12/30  - was on IV bumex 1mg daily with hold parameters for hypotension - stopped on 1/2 (at home, on torsemide)  - Entresto restarted and monitor BP  - metoprolol on hold  - hypotension overall improved  - Trops stable at 58  - Daily weights, I's and O's, fluid restriction 1.5 L  - evaluated by heart failure  - Cardio consult and f/u appreciated - mod-high risk for minor risk procedures - they will discuss with structural heart re: options for AS of bioprosthetic valve and inform med team when anticoagulation will be restarted based on findings from anemia workup    2. Acute on chronic anemia likely due to GI bleed   - XIMENA in ED positive   - Hemoglobin: 7.9 g/dL (12.30.24 @ 11:45) Hemoglobin: 12.2 g/dL (12.16.24 @ 08:06)   - Last use of Eliquis 2 days prior to admission  - monitor H/H  - keep active T&S  - Hgb now improved at 9.1  - GI consult and f/u appreciated   - s/p EGD 1/2: hiatal hernia, nonerosive gastritis (f/u pathology from biopsy)  - s/p colonoscopy 1/3: f/u with GI re: findings  - on protonix IV bid  - CT scan abd/pelvis: no retroperitoneal hematoma or hemoperitoneum    3. Chronic afib   telemetry reviewed by me: paced  Holding Metoprolol and Eliquis for now    4. Constipation  KUB noted 12/31  started senna and miralax and monitor BMs    5. DVT PPX: can use SCD (ordered)  Eliquis on hold for now    CODE STATUS: full    very guarded prognosis  high risk pt      PROGRESS NOTE HANDOFF    Pending: labs in AM, GI f/u re: colonoscopy results, resume diuretic and anticoagulation when OK with cardio, BP monitoring    Family discussion: with pt and wife at bedside    Disposition: home   73 y/o man with PMH of HTN, AVR x 3 (1996, 2011, and 2012), chronic Afib on Eliquis, s/p CRT for CHB in 2018 and stenosis of bioprosthetic aortic valve came to the ED for shortness of breath and hypotension.    1. 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   - TTE-12/14/2024: Reported history of AVR x 3. Peak transaortic velocity is 4.23 m/s, peak/mean transaortic gradient is 72/46 mmHg with an LVOT/aortic valve VTI ratio of 0.25. EOA 0.96 , moderate eccentric AI, mod to severe MR, moderate pulm HTN   - IV bumex was given after 1 unit of pRBCs on 12/30  - was on IV bumex 1mg daily with hold parameters for hypotension - stopped on 1/2 (at home, on torsemide)  - Entresto restarted and monitor BP  - metoprolol on hold  - hypotension overall improved  - Trops stable at 58  - Daily weights, I's and O's, fluid restriction 1.5 L  - evaluated by heart failure  - Cardio consult and f/u appreciated - mod-high risk for minor risk procedures - they will discuss with structural heart re: options for AS of bioprosthetic valve and inform med team when anticoagulation will be restarted based on findings from anemia workup    2. Acute on chronic anemia likely due to GI bleed   - XIMENA in ED positive   - Hemoglobin: 7.9 g/dL (12.30.24 @ 11:45) Hemoglobin: 12.2 g/dL (12.16.24 @ 08:06)   - Last use of Eliquis 2 days prior to admission  - monitor H/H  - keep active T&S  - Hgb now improved at 9.1  - GI consult and f/u appreciated   - s/p EGD 1/2: hiatal hernia, nonerosive gastritis (f/u pathology from biopsy)  - s/p colonoscopy 1/3: f/u with GI re: findings  - on protonix IV bid  - CT scan abd/pelvis: no retroperitoneal hematoma or hemoperitoneum    3. Chronic afib   telemetry reviewed by me: paced  Holding Metoprolol and Eliquis for now    4. Constipation  KUB noted 12/31  started senna and miralax and monitor BMs    5. DVT PPX: can use SCD (ordered)  Eliquis on hold for now    CODE STATUS: full    very guarded prognosis  high risk pt      PROGRESS NOTE HANDOFF    Pending: labs in AM, GI f/u re: colonoscopy results, resume diuretic and anticoagulation when OK with cardio, BP monitoring    Family discussion: with pt and wife at bedside    Disposition: home      Addendum  informed by resident on call that pt is very anxious to leave tonight  case discussed with Dr. Foster who states that pt may be discharged home from his standpoint  Will discharge on Entresto, torsemide   pt may resume Eliquis 2.5mg bid starting next week  GI note re: colonoscopy results reviewed - f/u pathology as outpt  capsule endoscopy as outpt    med rec and discharge papers reviewed and edited by me  spent 50 minutes on the discharge process of this pt

## 2025-01-03 NOTE — DISCHARGE NOTE PROVIDER - NSDCMRMEDTOKEN_GEN_ALL_CORE_FT
Crestor 10 mg oral tablet: 1 tab(s) orally once a day  Eliquis 2.5 mg oral tablet: 1 tab(s) orally 2 times a day  Entresto 24 mg-26 mg oral tablet: 0.5 tab(s) orally 2 times a day  fenofibrate 134 mg oral capsule: 1 cap(s) orally once a day  Latanoprost Ophthalmic: 1 application to each affected eye once a day  metFORMIN 500 mg oral tablet: 1 tab(s) orally 2 times a day  metoprolol succinate 25 mg oral tablet, extended release: 1 tab(s) orally 2 times a day  pantoprazole 40 mg oral delayed release tablet: 1 tab(s) orally once a day  torsemide 20 mg oral tablet: 1 tab(s) orally once a day  Uroxatral 10 mg oral tablet, extended release: 1 tab(s) orally once a day   Crestor 10 mg oral tablet: 1 tab(s) orally once a day  Eliquis 2.5 mg oral tablet: 1 tab(s) orally 2 times a day  Entresto 24 mg-26 mg oral tablet: 0.5 tab(s) orally 2 times a day  fenofibrate 134 mg oral capsule: 1 cap(s) orally once a day  Latanoprost Ophthalmic: 1 application to each affected eye once a day  metFORMIN 500 mg oral tablet: 1 tab(s) orally 2 times a day  pantoprazole 40 mg oral delayed release tablet: 1 tab(s) orally once a day  torsemide 20 mg oral tablet: 1 tab(s) orally once a day  Uroxatral 10 mg oral tablet, extended release: 1 tab(s) orally once a day

## 2025-01-03 NOTE — DISCHARGE NOTE NURSING/CASE MANAGEMENT/SOCIAL WORK - PATIENT PORTAL LINK FT
You can access the FollowMyHealth Patient Portal offered by [facility] by registering at the following website: [email or web address]. By joining [employer]’s FollowMyHealth portal, you will also be able to view your health information using other applications (apps) compatible with our system.

## 2025-01-03 NOTE — DISCHARGE NOTE PROVIDER - NSDCFUSCHEDAPPT_GEN_ALL_CORE_FT
Robert Sena  [facility]  CARDIOLOGY [address]  Scheduled Appointment: 01/09/2025    [facility]  CARDIOLOGY [address]  Scheduled Appointment: 01/17/2025    Ruby Ramires  [facility]  HEARTFAIL [address]  Scheduled Appointment: 01/23/2025    Bandar Box  [facility]  PULMMED [address]  Scheduled Appointment: 04/01/2025

## 2025-01-03 NOTE — PRE-ANESTHESIA EVALUATION ADULT - NSANTHPMHFT_GEN_ALL_CORE
Chart revived, MED/ GI/ Cardiology evaluation seen. pt was admitted with low H/H had one unit of blood, repeat  Hb 9.1.  PMH : AVR, S/P CHF, S/P CHB  and S/P PPM, Severe PHT RH cath 12/2024 PAP 70/30/47 Chart revived, MED/ GI/ Cardiology evaluation seen. pt was admitted with low H/H had one unit of blood, repeat  Hb 9.1.  PMH : AVR, S/P CHF Paroxysmal AFib,, S/P CHB  and S/P PPM, Severe PHT RH cath 12/2024 PAP 70/30/47

## 2025-01-03 NOTE — PROGRESS NOTE ADULT - ASSESSMENT
Moderate- severe/ severe AS  Severe anemia, likely GI loss, awaiting colonoscopy  Recovered from HF and volume overload    stable to have the colonoscopy done  i spoke to him about the AS, possibly TAVR  Will f/u with structural team, will do a discussion with Dr samuels to decide the best options for him  Curren therapy  for AC: will stop ASA and later on will consider Eliquis 2.5 bid if clear cause of bleeding is not detected.

## 2025-01-06 RX ORDER — APIXABAN 5 MG/1
1 TABLET, FILM COATED ORAL
Qty: 60 | Refills: 0
Start: 2025-01-06 | End: 2025-02-04

## 2025-01-09 ENCOUNTER — APPOINTMENT (OUTPATIENT)
Dept: CARDIOTHORACIC SURGERY | Facility: CLINIC | Age: 75
End: 2025-01-09

## 2025-01-09 ENCOUNTER — APPOINTMENT (OUTPATIENT)
Dept: CARDIOLOGY | Facility: CLINIC | Age: 75
End: 2025-01-09

## 2025-01-09 DIAGNOSIS — Z79.82 LONG TERM (CURRENT) USE OF ASPIRIN: ICD-10-CM

## 2025-01-09 DIAGNOSIS — Z79.84 LONG TERM (CURRENT) USE OF ORAL HYPOGLYCEMIC DRUGS: ICD-10-CM

## 2025-01-09 DIAGNOSIS — I13.0 HYPERTENSIVE HEART AND CHRONIC KIDNEY DISEASE WITH HEART FAILURE AND STAGE 1 THROUGH STAGE 4 CHRONIC KIDNEY DISEASE, OR UNSPECIFIED CHRONIC KIDNEY DISEASE: ICD-10-CM

## 2025-01-09 DIAGNOSIS — T50.905A ADVERSE EFFECT OF UNSPECIFIED DRUGS, MEDICAMENTS AND BIOLOGICAL SUBSTANCES, INITIAL ENCOUNTER: ICD-10-CM

## 2025-01-09 DIAGNOSIS — D62 ACUTE POSTHEMORRHAGIC ANEMIA: ICD-10-CM

## 2025-01-09 DIAGNOSIS — Z79.01 LONG TERM (CURRENT) USE OF ANTICOAGULANTS: ICD-10-CM

## 2025-01-09 DIAGNOSIS — I27.20 PULMONARY HYPERTENSION, UNSPECIFIED: ICD-10-CM

## 2025-01-09 DIAGNOSIS — N17.9 ACUTE KIDNEY FAILURE, UNSPECIFIED: ICD-10-CM

## 2025-01-09 DIAGNOSIS — K92.1 MELENA: ICD-10-CM

## 2025-01-09 DIAGNOSIS — E44.0 MODERATE PROTEIN-CALORIE MALNUTRITION: ICD-10-CM

## 2025-01-09 DIAGNOSIS — K57.30 DIVERTICULOSIS OF LARGE INTESTINE WITHOUT PERFORATION OR ABSCESS WITHOUT BLEEDING: ICD-10-CM

## 2025-01-09 DIAGNOSIS — E78.5 HYPERLIPIDEMIA, UNSPECIFIED: ICD-10-CM

## 2025-01-09 DIAGNOSIS — N18.9 CHRONIC KIDNEY DISEASE, UNSPECIFIED: ICD-10-CM

## 2025-01-09 DIAGNOSIS — K64.8 OTHER HEMORRHOIDS: ICD-10-CM

## 2025-01-09 DIAGNOSIS — I35.2 NONRHEUMATIC AORTIC (VALVE) STENOSIS WITH INSUFFICIENCY: ICD-10-CM

## 2025-01-09 DIAGNOSIS — I50.33 ACUTE ON CHRONIC DIASTOLIC (CONGESTIVE) HEART FAILURE: ICD-10-CM

## 2025-01-09 DIAGNOSIS — Z90.49 ACQUIRED ABSENCE OF OTHER SPECIFIED PARTS OF DIGESTIVE TRACT: ICD-10-CM

## 2025-01-09 DIAGNOSIS — I48.20 CHRONIC ATRIAL FIBRILLATION, UNSPECIFIED: ICD-10-CM

## 2025-01-09 DIAGNOSIS — R06.02 SHORTNESS OF BREATH: ICD-10-CM

## 2025-01-09 DIAGNOSIS — M25.562 PAIN IN LEFT KNEE: ICD-10-CM

## 2025-01-09 DIAGNOSIS — I95.2 HYPOTENSION DUE TO DRUGS: ICD-10-CM

## 2025-01-09 DIAGNOSIS — Z95.0 PRESENCE OF CARDIAC PACEMAKER: ICD-10-CM

## 2025-01-09 DIAGNOSIS — K29.70 GASTRITIS, UNSPECIFIED, WITHOUT BLEEDING: ICD-10-CM

## 2025-01-09 DIAGNOSIS — Z11.52 ENCOUNTER FOR SCREENING FOR COVID-19: ICD-10-CM

## 2025-01-09 DIAGNOSIS — N40.0 BENIGN PROSTATIC HYPERPLASIA WITHOUT LOWER URINARY TRACT SYMPTOMS: ICD-10-CM

## 2025-01-09 DIAGNOSIS — K59.00 CONSTIPATION, UNSPECIFIED: ICD-10-CM

## 2025-01-14 ENCOUNTER — NON-APPOINTMENT (OUTPATIENT)
Age: 75
End: 2025-01-14

## 2025-01-23 ENCOUNTER — APPOINTMENT (OUTPATIENT)
Dept: HEART FAILURE | Facility: CLINIC | Age: 75
End: 2025-01-23

## 2025-02-06 ENCOUNTER — OUTPATIENT (OUTPATIENT)
Dept: OUTPATIENT SERVICES | Facility: HOSPITAL | Age: 75
LOS: 1 days | End: 2025-02-06
Payer: MEDICARE

## 2025-02-06 DIAGNOSIS — Z98.890 OTHER SPECIFIED POSTPROCEDURAL STATES: Chronic | ICD-10-CM

## 2025-02-06 DIAGNOSIS — D72.829 ELEVATED WHITE BLOOD CELL COUNT, UNSPECIFIED: ICD-10-CM

## 2025-02-06 DIAGNOSIS — Z95.2 PRESENCE OF PROSTHETIC HEART VALVE: Chronic | ICD-10-CM

## 2025-02-06 DIAGNOSIS — I35.0 NONRHEUMATIC AORTIC (VALVE) STENOSIS: ICD-10-CM

## 2025-02-06 LAB — GLUCOSE BLDC GLUCOMTR-MCNC: 117 MG/DL — HIGH (ref 70–99)

## 2025-02-06 PROCEDURE — 78815 PET IMAGE W/CT SKULL-THIGH: CPT | Mod: 26,PI

## 2025-02-06 PROCEDURE — 78815 PET IMAGE W/CT SKULL-THIGH: CPT | Mod: PI

## 2025-02-06 PROCEDURE — A9552: CPT

## 2025-02-06 PROCEDURE — 82962 GLUCOSE BLOOD TEST: CPT

## 2025-02-07 ENCOUNTER — NON-APPOINTMENT (OUTPATIENT)
Age: 75
End: 2025-02-07

## 2025-02-07 ENCOUNTER — APPOINTMENT (OUTPATIENT)
Dept: CARDIOLOGY | Facility: CLINIC | Age: 75
End: 2025-02-07
Payer: MEDICARE

## 2025-02-07 DIAGNOSIS — I35.0 NONRHEUMATIC AORTIC (VALVE) STENOSIS: ICD-10-CM

## 2025-02-07 DIAGNOSIS — D72.829 ELEVATED WHITE BLOOD CELL COUNT, UNSPECIFIED: ICD-10-CM

## 2025-02-07 PROCEDURE — 93294 REM INTERROG EVL PM/LDLS PM: CPT

## 2025-02-07 PROCEDURE — 93296 REM INTERROG EVL PM/IDS: CPT

## 2025-03-14 ENCOUNTER — OUTPATIENT (OUTPATIENT)
Dept: OUTPATIENT SERVICES | Facility: HOSPITAL | Age: 75
LOS: 1 days | End: 2025-03-14
Payer: MEDICARE

## 2025-03-14 DIAGNOSIS — I35.0 NONRHEUMATIC AORTIC (VALVE) STENOSIS: ICD-10-CM

## 2025-03-14 DIAGNOSIS — Z98.890 OTHER SPECIFIED POSTPROCEDURAL STATES: Chronic | ICD-10-CM

## 2025-03-14 DIAGNOSIS — Z95.2 PRESENCE OF PROSTHETIC HEART VALVE: Chronic | ICD-10-CM

## 2025-03-14 PROCEDURE — 75572 CT HRT W/3D IMAGE: CPT | Mod: 26

## 2025-03-14 PROCEDURE — 75572 CT HRT W/3D IMAGE: CPT

## 2025-03-15 DIAGNOSIS — I35.0 NONRHEUMATIC AORTIC (VALVE) STENOSIS: ICD-10-CM

## 2025-03-17 ENCOUNTER — TRANSCRIPTION ENCOUNTER (OUTPATIENT)
Age: 75
End: 2025-03-17

## 2025-04-01 ENCOUNTER — APPOINTMENT (OUTPATIENT)
Dept: PULMONOLOGY | Facility: CLINIC | Age: 75
End: 2025-04-01

## 2025-04-08 ENCOUNTER — OUTPATIENT (OUTPATIENT)
Dept: OUTPATIENT SERVICES | Facility: HOSPITAL | Age: 75
LOS: 1 days | End: 2025-04-08
Payer: MEDICARE

## 2025-04-08 ENCOUNTER — APPOINTMENT (OUTPATIENT)
Age: 75
End: 2025-04-08

## 2025-04-08 DIAGNOSIS — Z98.890 OTHER SPECIFIED POSTPROCEDURAL STATES: Chronic | ICD-10-CM

## 2025-04-08 PROCEDURE — 93798 PHYS/QHP OP CAR RHAB W/ECG: CPT

## 2025-04-09 DIAGNOSIS — Z95.2 PRESENCE OF PROSTHETIC HEART VALVE: ICD-10-CM

## 2025-04-14 ENCOUNTER — APPOINTMENT (OUTPATIENT)
Age: 75
End: 2025-04-14

## 2025-04-14 ENCOUNTER — OUTPATIENT (OUTPATIENT)
Dept: OUTPATIENT SERVICES | Facility: HOSPITAL | Age: 75
LOS: 1 days | End: 2025-04-14

## 2025-04-14 DIAGNOSIS — Z95.2 PRESENCE OF PROSTHETIC HEART VALVE: ICD-10-CM

## 2025-04-14 DIAGNOSIS — Z98.890 OTHER SPECIFIED POSTPROCEDURAL STATES: Chronic | ICD-10-CM

## 2025-04-14 DIAGNOSIS — Z95.2 PRESENCE OF PROSTHETIC HEART VALVE: Chronic | ICD-10-CM

## 2025-04-16 ENCOUNTER — APPOINTMENT (OUTPATIENT)
Age: 75
End: 2025-04-16

## 2025-04-16 ENCOUNTER — OUTPATIENT (OUTPATIENT)
Dept: OUTPATIENT SERVICES | Facility: HOSPITAL | Age: 75
LOS: 1 days | End: 2025-04-16

## 2025-04-16 DIAGNOSIS — Z98.890 OTHER SPECIFIED POSTPROCEDURAL STATES: Chronic | ICD-10-CM

## 2025-04-16 DIAGNOSIS — Z95.2 PRESENCE OF PROSTHETIC HEART VALVE: ICD-10-CM

## 2025-04-16 DIAGNOSIS — Z95.2 PRESENCE OF PROSTHETIC HEART VALVE: Chronic | ICD-10-CM

## 2025-04-18 ENCOUNTER — OUTPATIENT (OUTPATIENT)
Dept: OUTPATIENT SERVICES | Facility: HOSPITAL | Age: 75
LOS: 1 days | End: 2025-04-18

## 2025-04-18 ENCOUNTER — APPOINTMENT (OUTPATIENT)
Age: 75
End: 2025-04-18

## 2025-04-18 DIAGNOSIS — Z98.890 OTHER SPECIFIED POSTPROCEDURAL STATES: Chronic | ICD-10-CM

## 2025-04-18 DIAGNOSIS — Z95.2 PRESENCE OF PROSTHETIC HEART VALVE: ICD-10-CM

## 2025-04-18 DIAGNOSIS — Z95.2 PRESENCE OF PROSTHETIC HEART VALVE: Chronic | ICD-10-CM

## 2025-04-21 ENCOUNTER — APPOINTMENT (OUTPATIENT)
Age: 75
End: 2025-04-21

## 2025-04-21 ENCOUNTER — OUTPATIENT (OUTPATIENT)
Dept: OUTPATIENT SERVICES | Facility: HOSPITAL | Age: 75
LOS: 1 days | End: 2025-04-21

## 2025-04-21 DIAGNOSIS — Z95.2 PRESENCE OF PROSTHETIC HEART VALVE: ICD-10-CM

## 2025-04-21 DIAGNOSIS — Z95.2 PRESENCE OF PROSTHETIC HEART VALVE: Chronic | ICD-10-CM

## 2025-04-21 DIAGNOSIS — Z98.890 OTHER SPECIFIED POSTPROCEDURAL STATES: Chronic | ICD-10-CM

## 2025-04-23 ENCOUNTER — APPOINTMENT (OUTPATIENT)
Age: 75
End: 2025-04-23

## 2025-04-23 ENCOUNTER — OUTPATIENT (OUTPATIENT)
Dept: OUTPATIENT SERVICES | Facility: HOSPITAL | Age: 75
LOS: 1 days | End: 2025-04-23

## 2025-04-23 DIAGNOSIS — Z95.2 PRESENCE OF PROSTHETIC HEART VALVE: Chronic | ICD-10-CM

## 2025-04-23 DIAGNOSIS — Z98.890 OTHER SPECIFIED POSTPROCEDURAL STATES: Chronic | ICD-10-CM

## 2025-04-23 DIAGNOSIS — Z95.2 PRESENCE OF PROSTHETIC HEART VALVE: ICD-10-CM

## 2025-04-25 ENCOUNTER — APPOINTMENT (OUTPATIENT)
Age: 75
End: 2025-04-25

## 2025-04-25 ENCOUNTER — OUTPATIENT (OUTPATIENT)
Dept: OUTPATIENT SERVICES | Facility: HOSPITAL | Age: 75
LOS: 1 days | End: 2025-04-25

## 2025-04-25 DIAGNOSIS — Z95.2 PRESENCE OF PROSTHETIC HEART VALVE: ICD-10-CM

## 2025-04-25 DIAGNOSIS — Z95.2 PRESENCE OF PROSTHETIC HEART VALVE: Chronic | ICD-10-CM

## 2025-04-25 DIAGNOSIS — Z98.890 OTHER SPECIFIED POSTPROCEDURAL STATES: Chronic | ICD-10-CM

## 2025-04-28 ENCOUNTER — APPOINTMENT (OUTPATIENT)
Age: 75
End: 2025-04-28

## 2025-04-28 ENCOUNTER — OUTPATIENT (OUTPATIENT)
Dept: OUTPATIENT SERVICES | Facility: HOSPITAL | Age: 75
LOS: 1 days | End: 2025-04-28

## 2025-04-28 DIAGNOSIS — Z98.890 OTHER SPECIFIED POSTPROCEDURAL STATES: Chronic | ICD-10-CM

## 2025-04-28 DIAGNOSIS — Z95.2 PRESENCE OF PROSTHETIC HEART VALVE: Chronic | ICD-10-CM

## 2025-04-28 DIAGNOSIS — Z95.2 PRESENCE OF PROSTHETIC HEART VALVE: ICD-10-CM

## 2025-04-29 ENCOUNTER — APPOINTMENT (OUTPATIENT)
Dept: PULMONOLOGY | Facility: CLINIC | Age: 75
End: 2025-04-29

## 2025-04-30 ENCOUNTER — APPOINTMENT (OUTPATIENT)
Age: 75
End: 2025-04-30

## 2025-04-30 ENCOUNTER — OUTPATIENT (OUTPATIENT)
Dept: OUTPATIENT SERVICES | Facility: HOSPITAL | Age: 75
LOS: 1 days | End: 2025-04-30

## 2025-04-30 DIAGNOSIS — Z98.890 OTHER SPECIFIED POSTPROCEDURAL STATES: Chronic | ICD-10-CM

## 2025-04-30 DIAGNOSIS — Z95.2 PRESENCE OF PROSTHETIC HEART VALVE: Chronic | ICD-10-CM

## 2025-04-30 DIAGNOSIS — Z95.2 PRESENCE OF PROSTHETIC HEART VALVE: ICD-10-CM

## 2025-05-02 ENCOUNTER — APPOINTMENT (OUTPATIENT)
Dept: ELECTROPHYSIOLOGY | Facility: CLINIC | Age: 75
End: 2025-05-02

## 2025-05-02 ENCOUNTER — OUTPATIENT (OUTPATIENT)
Dept: OUTPATIENT SERVICES | Facility: HOSPITAL | Age: 75
LOS: 1 days | End: 2025-05-02
Payer: MEDICARE

## 2025-05-02 ENCOUNTER — APPOINTMENT (OUTPATIENT)
Age: 75
End: 2025-05-02

## 2025-05-02 VITALS
DIASTOLIC BLOOD PRESSURE: 81 MMHG | HEART RATE: 83 BPM | TEMPERATURE: 98 F | BODY MASS INDEX: 25.58 KG/M2 | SYSTOLIC BLOOD PRESSURE: 137 MMHG | WEIGHT: 193 LBS | HEIGHT: 73 IN

## 2025-05-02 DIAGNOSIS — Z95.2 PRESENCE OF PROSTHETIC HEART VALVE: Chronic | ICD-10-CM

## 2025-05-02 DIAGNOSIS — Z98.890 OTHER SPECIFIED POSTPROCEDURAL STATES: Chronic | ICD-10-CM

## 2025-05-02 PROCEDURE — 99213 OFFICE O/P EST LOW 20 MIN: CPT

## 2025-05-02 PROCEDURE — 93290 INTERROG DEV EVAL ICPMS IP: CPT

## 2025-05-02 PROCEDURE — G2211 COMPLEX E/M VISIT ADD ON: CPT

## 2025-05-02 PROCEDURE — 93284 PRGRMG EVAL IMPLANTABLE DFB: CPT

## 2025-05-02 PROCEDURE — 93798 PHYS/QHP OP CAR RHAB W/ECG: CPT

## 2025-05-03 DIAGNOSIS — Z95.2 PRESENCE OF PROSTHETIC HEART VALVE: ICD-10-CM

## 2025-05-05 ENCOUNTER — OUTPATIENT (OUTPATIENT)
Dept: OUTPATIENT SERVICES | Facility: HOSPITAL | Age: 75
LOS: 1 days | End: 2025-05-05

## 2025-05-05 ENCOUNTER — APPOINTMENT (OUTPATIENT)
Age: 75
End: 2025-05-05

## 2025-05-05 DIAGNOSIS — Z98.890 OTHER SPECIFIED POSTPROCEDURAL STATES: Chronic | ICD-10-CM

## 2025-05-05 DIAGNOSIS — Z95.2 PRESENCE OF PROSTHETIC HEART VALVE: Chronic | ICD-10-CM

## 2025-05-05 DIAGNOSIS — Z95.2 PRESENCE OF PROSTHETIC HEART VALVE: ICD-10-CM

## 2025-05-07 ENCOUNTER — APPOINTMENT (OUTPATIENT)
Age: 75
End: 2025-05-07

## 2025-05-07 ENCOUNTER — OUTPATIENT (OUTPATIENT)
Dept: OUTPATIENT SERVICES | Facility: HOSPITAL | Age: 75
LOS: 1 days | End: 2025-05-07

## 2025-05-07 DIAGNOSIS — Z98.890 OTHER SPECIFIED POSTPROCEDURAL STATES: Chronic | ICD-10-CM

## 2025-05-07 DIAGNOSIS — Z95.2 PRESENCE OF PROSTHETIC HEART VALVE: Chronic | ICD-10-CM

## 2025-05-09 ENCOUNTER — OUTPATIENT (OUTPATIENT)
Dept: OUTPATIENT SERVICES | Facility: HOSPITAL | Age: 75
LOS: 1 days | End: 2025-05-09

## 2025-05-09 ENCOUNTER — APPOINTMENT (OUTPATIENT)
Age: 75
End: 2025-05-09

## 2025-05-09 DIAGNOSIS — Z98.890 OTHER SPECIFIED POSTPROCEDURAL STATES: Chronic | ICD-10-CM

## 2025-05-12 ENCOUNTER — OUTPATIENT (OUTPATIENT)
Dept: OUTPATIENT SERVICES | Facility: HOSPITAL | Age: 75
LOS: 1 days | End: 2025-05-12

## 2025-05-12 ENCOUNTER — APPOINTMENT (OUTPATIENT)
Age: 75
End: 2025-05-12

## 2025-05-12 DIAGNOSIS — Z98.890 OTHER SPECIFIED POSTPROCEDURAL STATES: Chronic | ICD-10-CM

## 2025-05-12 DIAGNOSIS — Z95.2 PRESENCE OF PROSTHETIC HEART VALVE: ICD-10-CM

## 2025-05-14 ENCOUNTER — APPOINTMENT (OUTPATIENT)
Age: 75
End: 2025-05-14

## 2025-05-14 ENCOUNTER — OUTPATIENT (OUTPATIENT)
Dept: OUTPATIENT SERVICES | Facility: HOSPITAL | Age: 75
LOS: 1 days | End: 2025-05-14

## 2025-05-14 DIAGNOSIS — Z98.890 OTHER SPECIFIED POSTPROCEDURAL STATES: Chronic | ICD-10-CM

## 2025-05-14 DIAGNOSIS — Z95.2 PRESENCE OF PROSTHETIC HEART VALVE: Chronic | ICD-10-CM

## 2025-05-14 DIAGNOSIS — Z95.2 PRESENCE OF PROSTHETIC HEART VALVE: ICD-10-CM

## 2025-05-16 ENCOUNTER — OUTPATIENT (OUTPATIENT)
Dept: OUTPATIENT SERVICES | Facility: HOSPITAL | Age: 75
LOS: 1 days | End: 2025-05-16

## 2025-05-16 ENCOUNTER — APPOINTMENT (OUTPATIENT)
Age: 75
End: 2025-05-16

## 2025-05-16 DIAGNOSIS — Z98.890 OTHER SPECIFIED POSTPROCEDURAL STATES: Chronic | ICD-10-CM

## 2025-05-16 DIAGNOSIS — Z95.2 PRESENCE OF PROSTHETIC HEART VALVE: Chronic | ICD-10-CM

## 2025-05-19 ENCOUNTER — OUTPATIENT (OUTPATIENT)
Dept: OUTPATIENT SERVICES | Facility: HOSPITAL | Age: 75
LOS: 1 days | End: 2025-05-19

## 2025-05-19 ENCOUNTER — APPOINTMENT (OUTPATIENT)
Age: 75
End: 2025-05-19

## 2025-05-19 DIAGNOSIS — Z98.890 OTHER SPECIFIED POSTPROCEDURAL STATES: Chronic | ICD-10-CM

## 2025-05-19 DIAGNOSIS — Z95.2 PRESENCE OF PROSTHETIC HEART VALVE: Chronic | ICD-10-CM

## 2025-05-21 ENCOUNTER — APPOINTMENT (OUTPATIENT)
Age: 75
End: 2025-05-21

## 2025-05-23 ENCOUNTER — APPOINTMENT (OUTPATIENT)
Age: 75
End: 2025-05-23

## 2025-05-28 ENCOUNTER — OUTPATIENT (OUTPATIENT)
Dept: OUTPATIENT SERVICES | Facility: HOSPITAL | Age: 75
LOS: 1 days | End: 2025-05-28

## 2025-05-28 ENCOUNTER — APPOINTMENT (OUTPATIENT)
Age: 75
End: 2025-05-28

## 2025-05-28 DIAGNOSIS — Z95.2 PRESENCE OF PROSTHETIC HEART VALVE: ICD-10-CM

## 2025-05-28 DIAGNOSIS — Z95.2 PRESENCE OF PROSTHETIC HEART VALVE: Chronic | ICD-10-CM

## 2025-05-28 DIAGNOSIS — Z98.890 OTHER SPECIFIED POSTPROCEDURAL STATES: Chronic | ICD-10-CM

## 2025-05-30 ENCOUNTER — OUTPATIENT (OUTPATIENT)
Dept: OUTPATIENT SERVICES | Facility: HOSPITAL | Age: 75
LOS: 1 days | End: 2025-05-30

## 2025-05-30 ENCOUNTER — APPOINTMENT (OUTPATIENT)
Age: 75
End: 2025-05-30

## 2025-05-30 DIAGNOSIS — Z98.890 OTHER SPECIFIED POSTPROCEDURAL STATES: Chronic | ICD-10-CM

## 2025-05-30 DIAGNOSIS — Z95.2 PRESENCE OF PROSTHETIC HEART VALVE: ICD-10-CM

## 2025-05-30 DIAGNOSIS — Z95.2 PRESENCE OF PROSTHETIC HEART VALVE: Chronic | ICD-10-CM

## 2025-06-02 ENCOUNTER — OUTPATIENT (OUTPATIENT)
Dept: OUTPATIENT SERVICES | Facility: HOSPITAL | Age: 75
LOS: 1 days | End: 2025-06-02
Payer: MEDICARE

## 2025-06-02 ENCOUNTER — APPOINTMENT (OUTPATIENT)
Age: 75
End: 2025-06-02

## 2025-06-02 DIAGNOSIS — Z98.890 OTHER SPECIFIED POSTPROCEDURAL STATES: Chronic | ICD-10-CM

## 2025-06-02 DIAGNOSIS — Z95.2 PRESENCE OF PROSTHETIC HEART VALVE: Chronic | ICD-10-CM

## 2025-06-02 PROCEDURE — 93798 PHYS/QHP OP CAR RHAB W/ECG: CPT

## 2025-06-02 NOTE — ASSESSMENT
[FreeTextEntry1] : AF/AFL - pt decided to stopped AC on his own; I recommended to continue risk and befits explained\par \par 
How Severe Is Your Rash?: moderate
Is This A New Presentation, Or A Follow-Up?: Rash

## 2025-06-04 ENCOUNTER — APPOINTMENT (OUTPATIENT)
Age: 75
End: 2025-06-04

## 2025-06-04 ENCOUNTER — OUTPATIENT (OUTPATIENT)
Dept: OUTPATIENT SERVICES | Facility: HOSPITAL | Age: 75
LOS: 1 days | End: 2025-06-04

## 2025-06-04 DIAGNOSIS — Z95.2 PRESENCE OF PROSTHETIC HEART VALVE: Chronic | ICD-10-CM

## 2025-06-04 DIAGNOSIS — Z98.890 OTHER SPECIFIED POSTPROCEDURAL STATES: Chronic | ICD-10-CM

## 2025-06-06 ENCOUNTER — OUTPATIENT (OUTPATIENT)
Dept: OUTPATIENT SERVICES | Facility: HOSPITAL | Age: 75
LOS: 1 days | End: 2025-06-06

## 2025-06-06 ENCOUNTER — APPOINTMENT (OUTPATIENT)
Age: 75
End: 2025-06-06

## 2025-06-06 DIAGNOSIS — Z98.890 OTHER SPECIFIED POSTPROCEDURAL STATES: Chronic | ICD-10-CM

## 2025-06-06 DIAGNOSIS — Z95.2 PRESENCE OF PROSTHETIC HEART VALVE: Chronic | ICD-10-CM

## 2025-06-09 ENCOUNTER — APPOINTMENT (OUTPATIENT)
Age: 75
End: 2025-06-09

## 2025-06-09 ENCOUNTER — OUTPATIENT (OUTPATIENT)
Dept: OUTPATIENT SERVICES | Facility: HOSPITAL | Age: 75
LOS: 1 days | End: 2025-06-09

## 2025-06-09 DIAGNOSIS — Z95.2 PRESENCE OF PROSTHETIC HEART VALVE: ICD-10-CM

## 2025-06-09 DIAGNOSIS — Z95.2 PRESENCE OF PROSTHETIC HEART VALVE: Chronic | ICD-10-CM

## 2025-06-09 DIAGNOSIS — Z98.890 OTHER SPECIFIED POSTPROCEDURAL STATES: Chronic | ICD-10-CM

## 2025-06-11 ENCOUNTER — OUTPATIENT (OUTPATIENT)
Dept: OUTPATIENT SERVICES | Facility: HOSPITAL | Age: 75
LOS: 1 days | End: 2025-06-11

## 2025-06-11 ENCOUNTER — APPOINTMENT (OUTPATIENT)
Age: 75
End: 2025-06-11

## 2025-06-11 DIAGNOSIS — Z98.890 OTHER SPECIFIED POSTPROCEDURAL STATES: Chronic | ICD-10-CM

## 2025-06-11 DIAGNOSIS — Z95.2 PRESENCE OF PROSTHETIC HEART VALVE: Chronic | ICD-10-CM

## 2025-06-13 ENCOUNTER — APPOINTMENT (OUTPATIENT)
Age: 75
End: 2025-06-13

## 2025-06-16 ENCOUNTER — APPOINTMENT (OUTPATIENT)
Age: 75
End: 2025-06-16

## 2025-06-16 ENCOUNTER — OUTPATIENT (OUTPATIENT)
Dept: OUTPATIENT SERVICES | Facility: HOSPITAL | Age: 75
LOS: 1 days | End: 2025-06-16

## 2025-06-16 DIAGNOSIS — Z98.890 OTHER SPECIFIED POSTPROCEDURAL STATES: Chronic | ICD-10-CM

## 2025-06-16 DIAGNOSIS — Z95.2 PRESENCE OF PROSTHETIC HEART VALVE: Chronic | ICD-10-CM

## 2025-06-18 ENCOUNTER — OUTPATIENT (OUTPATIENT)
Dept: OUTPATIENT SERVICES | Facility: HOSPITAL | Age: 75
LOS: 1 days | End: 2025-06-18

## 2025-06-18 ENCOUNTER — APPOINTMENT (OUTPATIENT)
Age: 75
End: 2025-06-18

## 2025-06-18 DIAGNOSIS — Z98.890 OTHER SPECIFIED POSTPROCEDURAL STATES: Chronic | ICD-10-CM

## 2025-06-18 DIAGNOSIS — Z95.2 PRESENCE OF PROSTHETIC HEART VALVE: Chronic | ICD-10-CM

## 2025-06-18 DIAGNOSIS — Z95.2 PRESENCE OF PROSTHETIC HEART VALVE: ICD-10-CM

## 2025-06-20 ENCOUNTER — APPOINTMENT (OUTPATIENT)
Age: 75
End: 2025-06-20

## 2025-06-20 ENCOUNTER — OUTPATIENT (OUTPATIENT)
Dept: OUTPATIENT SERVICES | Facility: HOSPITAL | Age: 75
LOS: 1 days | End: 2025-06-20

## 2025-06-20 DIAGNOSIS — Z98.890 OTHER SPECIFIED POSTPROCEDURAL STATES: Chronic | ICD-10-CM

## 2025-06-20 DIAGNOSIS — Z95.2 PRESENCE OF PROSTHETIC HEART VALVE: Chronic | ICD-10-CM

## 2025-06-20 DIAGNOSIS — Z95.2 PRESENCE OF PROSTHETIC HEART VALVE: ICD-10-CM

## 2025-06-23 ENCOUNTER — APPOINTMENT (OUTPATIENT)
Age: 75
End: 2025-06-23

## 2025-06-25 ENCOUNTER — APPOINTMENT (OUTPATIENT)
Age: 75
End: 2025-06-25

## 2025-06-27 ENCOUNTER — APPOINTMENT (OUTPATIENT)
Age: 75
End: 2025-06-27

## 2025-06-30 ENCOUNTER — APPOINTMENT (OUTPATIENT)
Age: 75
End: 2025-06-30

## 2025-07-02 ENCOUNTER — APPOINTMENT (OUTPATIENT)
Age: 75
End: 2025-07-02

## 2025-07-07 ENCOUNTER — APPOINTMENT (OUTPATIENT)
Age: 75
End: 2025-07-07

## 2025-07-07 ENCOUNTER — OUTPATIENT (OUTPATIENT)
Dept: OUTPATIENT SERVICES | Facility: HOSPITAL | Age: 75
LOS: 1 days | Discharge: HOME | End: 2025-07-07
Payer: MEDICARE

## 2025-07-07 DIAGNOSIS — Z95.2 PRESENCE OF PROSTHETIC HEART VALVE: ICD-10-CM

## 2025-07-07 DIAGNOSIS — Z98.890 OTHER SPECIFIED POSTPROCEDURAL STATES: Chronic | ICD-10-CM

## 2025-07-07 PROCEDURE — 93798 PHYS/QHP OP CAR RHAB W/ECG: CPT

## 2025-07-08 DIAGNOSIS — Z95.2 PRESENCE OF PROSTHETIC HEART VALVE: ICD-10-CM

## 2025-07-09 ENCOUNTER — OUTPATIENT (OUTPATIENT)
Dept: OUTPATIENT SERVICES | Facility: HOSPITAL | Age: 75
LOS: 1 days | Discharge: HOME | End: 2025-07-09

## 2025-07-09 ENCOUNTER — APPOINTMENT (OUTPATIENT)
Age: 75
End: 2025-07-09

## 2025-07-09 DIAGNOSIS — Z98.890 OTHER SPECIFIED POSTPROCEDURAL STATES: Chronic | ICD-10-CM

## 2025-07-09 DIAGNOSIS — Z95.2 PRESENCE OF PROSTHETIC HEART VALVE: Chronic | ICD-10-CM

## 2025-07-09 DIAGNOSIS — Z95.2 PRESENCE OF PROSTHETIC HEART VALVE: ICD-10-CM

## 2025-07-11 ENCOUNTER — APPOINTMENT (OUTPATIENT)
Age: 75
End: 2025-07-11

## 2025-07-11 ENCOUNTER — OUTPATIENT (OUTPATIENT)
Dept: OUTPATIENT SERVICES | Facility: HOSPITAL | Age: 75
LOS: 1 days | Discharge: HOME | End: 2025-07-11

## 2025-07-11 DIAGNOSIS — Z95.2 PRESENCE OF PROSTHETIC HEART VALVE: ICD-10-CM

## 2025-07-11 DIAGNOSIS — Z98.890 OTHER SPECIFIED POSTPROCEDURAL STATES: Chronic | ICD-10-CM

## 2025-07-11 DIAGNOSIS — Z95.2 PRESENCE OF PROSTHETIC HEART VALVE: Chronic | ICD-10-CM

## 2025-07-14 ENCOUNTER — APPOINTMENT (OUTPATIENT)
Age: 75
End: 2025-07-14

## 2025-07-14 ENCOUNTER — OUTPATIENT (OUTPATIENT)
Dept: OUTPATIENT SERVICES | Facility: HOSPITAL | Age: 75
LOS: 1 days | Discharge: HOME | End: 2025-07-14

## 2025-07-14 DIAGNOSIS — Z95.2 PRESENCE OF PROSTHETIC HEART VALVE: ICD-10-CM

## 2025-07-14 DIAGNOSIS — Z95.2 PRESENCE OF PROSTHETIC HEART VALVE: Chronic | ICD-10-CM

## 2025-07-16 ENCOUNTER — APPOINTMENT (OUTPATIENT)
Age: 75
End: 2025-07-16

## 2025-07-16 ENCOUNTER — OUTPATIENT (OUTPATIENT)
Dept: OUTPATIENT SERVICES | Facility: HOSPITAL | Age: 75
LOS: 1 days | Discharge: HOME | End: 2025-07-16

## 2025-07-16 DIAGNOSIS — Z95.2 PRESENCE OF PROSTHETIC HEART VALVE: ICD-10-CM

## 2025-07-16 DIAGNOSIS — Z98.890 OTHER SPECIFIED POSTPROCEDURAL STATES: Chronic | ICD-10-CM

## 2025-07-18 ENCOUNTER — APPOINTMENT (OUTPATIENT)
Age: 75
End: 2025-07-18

## 2025-07-21 ENCOUNTER — APPOINTMENT (OUTPATIENT)
Age: 75
End: 2025-07-21

## 2025-07-23 ENCOUNTER — APPOINTMENT (OUTPATIENT)
Age: 75
End: 2025-07-23

## 2025-07-25 ENCOUNTER — APPOINTMENT (OUTPATIENT)
Age: 75
End: 2025-07-25

## 2025-07-28 ENCOUNTER — APPOINTMENT (OUTPATIENT)
Age: 75
End: 2025-07-28

## 2025-07-30 ENCOUNTER — APPOINTMENT (OUTPATIENT)
Age: 75
End: 2025-07-30

## 2025-08-05 ENCOUNTER — APPOINTMENT (OUTPATIENT)
Dept: CARDIOLOGY | Facility: CLINIC | Age: 75
End: 2025-08-05

## 2025-08-05 ENCOUNTER — NON-APPOINTMENT (OUTPATIENT)
Age: 75
End: 2025-08-05

## 2025-08-05 PROCEDURE — 93296 REM INTERROG EVL PM/IDS: CPT

## 2025-08-05 PROCEDURE — 93294 REM INTERROG EVL PM/LDLS PM: CPT
